# Patient Record
Sex: MALE | Race: WHITE | NOT HISPANIC OR LATINO | Employment: OTHER | URBAN - METROPOLITAN AREA
[De-identification: names, ages, dates, MRNs, and addresses within clinical notes are randomized per-mention and may not be internally consistent; named-entity substitution may affect disease eponyms.]

---

## 2019-06-25 ENCOUNTER — TELEPHONE (OUTPATIENT)
Dept: NEUROLOGY | Facility: CLINIC | Age: 59
End: 2019-06-25

## 2019-07-02 ENCOUNTER — OFFICE VISIT (OUTPATIENT)
Dept: UROLOGY | Facility: CLINIC | Age: 59
End: 2019-07-02
Payer: COMMERCIAL

## 2019-07-02 VITALS
DIASTOLIC BLOOD PRESSURE: 82 MMHG | WEIGHT: 240 LBS | HEIGHT: 71 IN | BODY MASS INDEX: 33.6 KG/M2 | HEART RATE: 105 BPM | SYSTOLIC BLOOD PRESSURE: 138 MMHG

## 2019-07-02 DIAGNOSIS — R35.1 BENIGN PROSTATIC HYPERPLASIA WITH NOCTURIA: Primary | ICD-10-CM

## 2019-07-02 DIAGNOSIS — N40.1 BENIGN PROSTATIC HYPERPLASIA WITH NOCTURIA: Primary | ICD-10-CM

## 2019-07-02 LAB — POST-VOID RESIDUAL VOLUME, ML POC: 41 ML

## 2019-07-02 PROCEDURE — 51798 US URINE CAPACITY MEASURE: CPT | Performed by: UROLOGY

## 2019-07-02 PROCEDURE — 99204 OFFICE O/P NEW MOD 45 MIN: CPT | Performed by: UROLOGY

## 2019-07-02 RX ORDER — OMEPRAZOLE 20 MG/1
20 CAPSULE, DELAYED RELEASE ORAL 2 TIMES DAILY
COMMUNITY
Start: 2019-06-25 | End: 2020-08-07 | Stop reason: HOSPADM

## 2019-07-02 RX ORDER — VENLAFAXINE HYDROCHLORIDE 150 MG/1
150 CAPSULE, EXTENDED RELEASE ORAL DAILY
COMMUNITY
Start: 2019-02-18

## 2019-07-02 RX ORDER — LEVOTHYROXINE SODIUM 75 UG/1
75 CAPSULE ORAL DAILY
COMMUNITY
Start: 2019-02-18

## 2019-07-02 RX ORDER — ALBUTEROL SULFATE 90 UG/1
2 AEROSOL, METERED RESPIRATORY (INHALATION) EVERY 6 HOURS PRN
COMMUNITY
Start: 2019-04-08 | End: 2020-04-07

## 2019-07-02 RX ORDER — OMEPRAZOLE 20 MG/1
20 CAPSULE, DELAYED RELEASE ORAL 2 TIMES DAILY
COMMUNITY
Start: 2019-02-18 | End: 2019-09-25

## 2019-07-02 RX ORDER — BUSPIRONE HYDROCHLORIDE 10 MG/1
TABLET ORAL 3 TIMES DAILY
COMMUNITY

## 2019-07-02 RX ORDER — CYCLOBENZAPRINE HCL 10 MG
10 TABLET ORAL
COMMUNITY
Start: 2019-06-24

## 2019-07-02 NOTE — PROGRESS NOTES
Referring Physician: Tara Nguyen MD  A copy of this note was sent to the referring physician  Diagnoses and all orders for this visit:    Benign prostatic hyperplasia with nocturia  -     POCT Measure PVR    Other orders  -     omeprazole (PriLOSEC) 20 mg delayed release capsule; Take 20 mg by mouth 2 (two) times a day  -     albuterol (PROVENTIL HFA) 90 mcg/act inhaler; Inhale 2 puffs every 6 (six) hours as needed  -     VENTOLIN  (90 Base) MCG/ACT inhaler  -     busPIRone (BUSPAR) 10 mg tablet; Take by mouth  -     cyclobenzaprine (FLEXERIL) 10 mg tablet  -     venlafaxine (EFFEXOR-XR) 150 mg 24 hr capsule; Take 150 mg by mouth daily  -     omeprazole (PriLOSEC) 20 mg delayed release capsule  -     Levothyroxine Sodium (TIROSINT) 75 MCG CAPS; Take 75 mcg by mouth daily            Assessment and plan:       1  Medically refractory lower urinary tract symptoms  - failed prior alpha blockade and anticholinergics      Today, we discussed a stepwise approach in treating lower urinary tract symptoms associated with BPH  Lower urinary tract symptoms (LUTS) can be sub-divided into those that result from failure of the bladder to store urine normally ("storage symptoms"), those that result from difficulties in emptying ("voiding symptoms"), or those that follow micturition ("post-micturition symptoms")  Obstructive symptoms such as hesitancy, weak stream, and pushing to urinate are classified as voiding symptoms  OAB is due to the inability of the bladder to store urine normally  The symptoms of frequency, urgency, nocturia, and urge incontinence are classified as storage symptoms  I discussed the mainstays of therapy for voiding symptoms including alpha blockers, 5-alpha reductase inhibitors, and surgical management including TURP (laser, monopolar, bipolar, button electrode), TUIP, and prostatectomy   I had a candid discussion about the risks of each of these medications and the mechanism of action  I also discussed the use of PD5 inhibitors for LUTS  Patient has failed medical management for BPH as well as overactive bladder  I have recommended further evaluation with cystoscopy and transrectal ultrasound  I recommended flexible cystoscopy for further evaluation  Discussed the risks benefits and alternatives to this diagnostic procedure  Risks include but are not limited to hematuria, pain, urinary tract infection, stricture formation, possible need for hospitalization  Patient verbalized understanding and has elected to proceed  Cystoscopy and transrectal ultrasound will be scheduled in the near future  Geoff Veliz MD      Chief Complaint     Second opinion lower urinary tract symptoms      History of Present Illness     Janet Soto is a 60 y o  male presents seeking an additional opinion for lower urinary tract symptoms  Trials describes a longstanding history of urinary frequency and urgency  He states this dates back to childhood  His symptoms have worsened over the past few years  He has been managed by an additional urologist   He has previously trialed and failed medical management for both for BPH as well as overactive bladder  He states that he was offered an InterStim  He states he has not undergone a diagnostic cystoscopy  Primary source of bother is frequency urgency  He has had occasional episodes of urge urinary incontinence  He also reports a number of systemic complaints including back pain, weakness, exercise intolerance  Does have a family history of prostate cancer in his father    Detailed Urologic History     - please refer to HPI    Review of Systems     Review of Systems   Constitutional: Positive for activity change  Negative for fatigue  HENT: Negative for congestion  Eyes: Negative for visual disturbance  Respiratory: Negative for shortness of breath and wheezing  Cardiovascular: Negative for chest pain and leg swelling  Gastrointestinal: Negative for abdominal pain  Endocrine: Positive for polyuria  Genitourinary: Positive for dysuria, frequency and urgency  Negative for flank pain and hematuria  Musculoskeletal: Negative for back pain  Allergic/Immunologic: Negative for immunocompromised state  Neurological: Negative for dizziness and numbness  Psychiatric/Behavioral: Negative for dysphoric mood  All other systems reviewed and are negative  AUA SYMPTOM SCORE      Most Recent Value   AUA SYMPTOM SCORE   How often have you had a sensation of not emptying your bladder completely after you finished urinating? 5   How often have you had to urinate again less than two hours after you finished urinating? 4   How often have you found you stopped and started again several times when you urinate? 5   How often have you found it difficult to postpone urination? 4   How often have you had a weak urinary stream?  5   How often have you had to push or strain to begin urination? 3   How many times did you most typically get up to urinate from the time you went to bed at night until the time you got up in the morning? 4   Quality of Life: If you were to spend the rest of your life with your urinary condition just the way it is now, how would you feel about that?  5   AUA SYMPTOM SCORE  30            Allergies     Allergies   Allergen Reactions    Aspirin     Halobetasol     Olanzapine        Physical Exam     Physical Exam   Constitutional: He is oriented to person, place, and time  He appears well-developed and well-nourished  No distress  Anxious   HENT:   Head: Normocephalic and atraumatic  Eyes: EOM are normal    Neck: Normal range of motion  Cardiovascular:   Overweight   Pulmonary/Chest: Effort normal and breath sounds normal    Abdominal: Soft  Genitourinary:   Genitourinary Comments: Negative CVA tenderness, negative suprapubic tenderness   Musculoskeletal: Normal range of motion     Neurological: He is alert and oriented to person, place, and time  Skin: Skin is warm  Psychiatric: He has a normal mood and affect  His behavior is normal            Vital Signs  There were no vitals filed for this visit  Current Medications     No current outpatient medications on file  Active Problems     There is no problem list on file for this patient  Past Medical History     No past medical history on file  Surgical History     No past surgical history on file  Family History     No family history on file  Social History     Social History     Social History     Tobacco Use   Smoking Status Not on file         Pertinent Lab Values     No results found for: CREATININE    No results found for: PSA    @RESULTRCNT(1H])@      Pertinent Imaging      - n/a    Portions of the record may have been created with voice recognition software   Occasional wrong word or "sound a like" substitutions may have occurred due to the inherent limitations of voice recognition software   Read the chart carefully and recognize, using context, where substitutions have occurred

## 2019-09-25 RX ORDER — IBUPROFEN 600 MG/1
TABLET ORAL
COMMUNITY
End: 2020-08-07 | Stop reason: HOSPADM

## 2019-09-26 ENCOUNTER — PROCEDURE VISIT (OUTPATIENT)
Dept: UROLOGY | Facility: CLINIC | Age: 59
End: 2019-09-26
Payer: COMMERCIAL

## 2019-09-26 ENCOUNTER — TELEPHONE (OUTPATIENT)
Dept: UROLOGY | Facility: AMBULATORY SURGERY CENTER | Age: 59
End: 2019-09-26

## 2019-09-26 VITALS
HEART RATE: 84 BPM | WEIGHT: 241.4 LBS | HEIGHT: 71 IN | BODY MASS INDEX: 33.8 KG/M2 | SYSTOLIC BLOOD PRESSURE: 132 MMHG | DIASTOLIC BLOOD PRESSURE: 80 MMHG

## 2019-09-26 DIAGNOSIS — N40.1 BENIGN PROSTATIC HYPERPLASIA WITH NOCTURIA: Primary | ICD-10-CM

## 2019-09-26 DIAGNOSIS — R35.1 BENIGN PROSTATIC HYPERPLASIA WITH NOCTURIA: Primary | ICD-10-CM

## 2019-09-26 DIAGNOSIS — R35.1 NOCTURIA: ICD-10-CM

## 2019-09-26 LAB — POST-VOID RESIDUAL VOLUME, ML POC: 12 ML

## 2019-09-26 PROCEDURE — 76872 US TRANSRECTAL: CPT | Performed by: UROLOGY

## 2019-09-26 PROCEDURE — 52000 CYSTOURETHROSCOPY: CPT | Performed by: UROLOGY

## 2019-09-26 PROCEDURE — 99214 OFFICE O/P EST MOD 30 MIN: CPT | Performed by: UROLOGY

## 2019-09-26 PROCEDURE — 51798 US URINE CAPACITY MEASURE: CPT | Performed by: UROLOGY

## 2019-09-26 PROCEDURE — 51741 ELECTRO-UROFLOWMETRY FIRST: CPT | Performed by: UROLOGY

## 2019-09-26 NOTE — TELEPHONE ENCOUNTER
I left a message for Melvin Parra to call me to confirm/discuss his Urolift on 11/7/19  Melvin Parra returned my call and confirmed Urolift date of 11/7/19

## 2019-09-26 NOTE — PROGRESS NOTES
Referring Physician: Barry Muhammad MD  A copy of this note was sent to the referring physician  Diagnoses and all orders for this visit:    Benign prostatic hyperplasia with nocturia  -     POCT Measure PVR  -     Case request operating room: [address] [place] [address]; Standing  -     Case request operating room: CYSTOSCOPY WITH INSERTION UROLIFT    Other orders  -     ibuprofen (MOTRIN) 600 mg tablet  -     Cancel: POCT urine dip  -     Diet NPO; Sips with meds; Standing  -     Place sequential compression device; Standing  -     ceFAZolin (ANCEF) 2,000 mg in dextrose 5 % 100 mL IVPB            Assessment and plan:       1  BPH w Medically refractory lower urinary tract symptoms  - failed prior alpha blockade and anticholinergics    We reviewed the options for treating BPH/LUTS which include but are not limited to expectant management, medical therapy, transurethral resection of prostate (TURP)  We also discussed minimally invasive options  At this point, the patient wishes to proceed with Urolift  This is a great option for the patient based on the following criteria:    - cystoscopy revealed bilobar hyperplasia  - estimated gland volume 45  - uroflow with   Maximum flow 3 mL/sec  - PVR with  10  - AUA SS 30  - Bother index: 5  - normal renal function  - no active urinary tract infection  - PSA: 2 7  - no documented allergy to nickel    - He has failed the following treatment options: tamsulosin, anticholergics   The additional morbidity of standard surgical options (ie, TURP) is not necessary given the above criteria  The risks of Urolift include but are not limited to bleeding, infection, reaction to anesthesia such as heart attack, stroke, DVT/PE, hyponatremia, bladder neck contracture, urethral stricture, injury to surrounding structures (ureters, rectum, etc)    We discussed that additional risks of trans urethral resection procedures such as incontinence, retrograde ejaculation, and erectile dysfunction have been only rarely reported with the Uro lift procedure  We did discuss that this is a new were procedure and a permanent implant  We discussed that there may be some long-term implications that her on for seen with this newer technology, such as perhaps complicating treatment for prostate cancer if indicated down the line  Finally, I told him that he may require additional procedures secondary to some of these complications  we also discussed the patient's extreme nocturia which is up to 10 times per night  Discussed the BPH is likely contributing but not necessarily the only mechanism for this  Realistic expectations were set regarding the anticipated results with this minimally invasive surgical procedure  Informed consent was obtained for the Uro lift procedure  This will be scheduled in the near future to be performed under IV sedation  He does have some transportation limitations  Will be scheduled at the [facility] per his request         Kristine Russell MD      Chief Complaint     Second opinion lower urinary tract symptoms      History of Present Illness     Linnea Crowell is a 60 y o  male presents  seeking an additional opinion for lower urinary tract symptoms  In brief, Medina Her describes a longstanding history of urinary frequency and urgency  He states this dates back to childhood  His symptoms have worsened over the past few years  He has been managed by an additional urologist   He has previously trialed and failed medical management for both for BPH as well as overactive bladder  He states that he was offered an InterStim  He states he has not undergone a diagnostic cystoscopy  Does have a family history of prostate cancer in his father    He presents   Today for cystoscopy and transrectal ultrasound for further evaluation  His lower urinary tract symptoms are unchanged    He continues to have rather extreme nocturia up to 10 times per night on occasion  Detailed Urologic History     - please refer to HPI    Review of Systems     Review of Systems   Constitutional: Positive for activity change  Negative for fatigue  HENT: Negative for congestion  Eyes: Negative for visual disturbance  Respiratory: Negative for shortness of breath and wheezing  Cardiovascular: Negative for chest pain and leg swelling  Gastrointestinal: Negative for abdominal pain  Endocrine: Positive for polyuria  Genitourinary: Positive for dysuria, frequency and urgency  Negative for flank pain and hematuria  Musculoskeletal: Negative for back pain  Allergic/Immunologic: Negative for immunocompromised state  Neurological: Negative for dizziness and numbness  Psychiatric/Behavioral: Negative for dysphoric mood  All other systems reviewed and are negative  AUA SYMPTOM SCORE      Most Recent Value   AUA SYMPTOM SCORE   How often have you had a sensation of not emptying your bladder completely after you finished urinating? 5   How often have you had to urinate again less than two hours after you finished urinating? 4   How often have you found you stopped and started again several times when you urinate? 5   How often have you found it difficult to postpone urination? 4   How often have you had a weak urinary stream?  5   How often have you had to push or strain to begin urination? 3   How many times did you most typically get up to urinate from the time you went to bed at night until the time you got up in the morning? 4   Quality of Life: If you were to spend the rest of your life with your urinary condition just the way it is now, how would you feel about that?  5   AUA SYMPTOM SCORE  30            Allergies     Allergies   Allergen Reactions    Aspirin     Halobetasol     Olanzapine        Physical Exam     Physical Exam   Constitutional: He is oriented to person, place, and time   He appears well-developed and well-nourished  No distress  Anxious   HENT:   Head: Normocephalic and atraumatic  Eyes: EOM are normal    Neck: Normal range of motion  Cardiovascular:   Overweight   Pulmonary/Chest: Effort normal and breath sounds normal    Abdominal: Soft  Genitourinary: Penis normal    Genitourinary Comments: Negative CVA tenderness, negative suprapubic tenderness   Musculoskeletal: Normal range of motion  Neurological: He is alert and oriented to person, place, and time  Skin: Skin is warm  Psychiatric: He has a normal mood and affect  His behavior is normal            Vital Signs  Vitals:    19 0839   BP: 132/80   Pulse: 84   Weight: 109 kg (241 lb 6 4 oz)   Height: 5' 11" (1 803 m)         Current Medications       Current Outpatient Medications:     albuterol (PROVENTIL HFA) 90 mcg/act inhaler, Inhale 2 puffs every 6 (six) hours as needed, Disp: , Rfl:     busPIRone (BUSPAR) 10 mg tablet, Take by mouth, Disp: , Rfl:     cyclobenzaprine (FLEXERIL) 10 mg tablet, , Disp: , Rfl:     ibuprofen (MOTRIN) 600 mg tablet, , Disp: , Rfl:     Levothyroxine Sodium (TIROSINT) 75 MCG CAPS, Take 75 mcg by mouth daily, Disp: , Rfl:     omeprazole (PriLOSEC) 20 mg delayed release capsule, , Disp: , Rfl:     venlafaxine (EFFEXOR-XR) 150 mg 24 hr capsule, Take 150 mg by mouth daily, Disp: , Rfl:       Active Problems     Patient Active Problem List   Diagnosis    Benign prostatic hyperplasia with nocturia         Past Medical History     History reviewed  No pertinent past medical history  Surgical History     Past Surgical History:   Procedure Laterality Date    HERNIA REPAIR           Family History     History reviewed  No pertinent family history        Social History     Social History     Social History     Tobacco Use   Smoking Status Former Smoker    Last attempt to quit:     Years since quittin 7   Smokeless Tobacco Never Used         Pertinent Lab Values     No results found for: CREATININE    2 7    @RESULTRCNT(1H])@      Pertinent Imaging      - n/a    Portions of the record may have been created with voice recognition software   Occasional wrong word or "sound a like" substitutions may have occurred due to the inherent limitations of voice recognition software   Read the chart carefully and recognize, using context, where substitutions have occurred

## 2019-09-26 NOTE — PROGRESS NOTES
Uroflow Result    Indication:     medically refractory lower urinary tract symptoms       Procedure  The patient was brought ambulatory to the [facility] and instructions provided  There asked to void volitionally into the uroflow apparatus  The following findings were noted:    Findings:  Voided Volume:    16  Maximum flow (Qmax):   3 ml/s  Description of emptying curve:   diminutive and flattened due to low emptied volume       Post Void Residual Measurement:  After voiding to completion the patient was brought to the exam room and positioned supine    Residual urine volume was measured with an ultrasound at:    10 ml

## 2019-09-26 NOTE — PROGRESS NOTES
Office Cystoscopy Procedure Note    Indication:     medically refractory lower urinary tract symptoms     Informed consent   The risks, benefits, complications, treatment options, and expected outcomes were discussed with the patient  The patient concurred with the proposed plan and provided informed consent  Anesthesia  Lidocaine jelly 2%    Antibiotic prophylaxis   None    Procedure  The patient was placed in the supineposition, was prepped and draped in the usual manner using sterile technique, and 2% lidocaine jelly instilled into the urethra  A 17 F flexible cystoscope was then inserted into the urethra and the urethra and bladder carefully examined  The following findings were noted:    Findings:  Urethra:  Mild stricturing of urethra  Prostate:  Bilobar hyperplasia, 100% kissing of the lateral lobes, no median lobe, no intravesical prostatic protrusion  Bladder:  Normal  Ureteral orifices:  Normal  Other findings:  None     Specimens: None                 Complications:    None; patient tolerated the procedure well           Disposition: To home after 30 minute observation             Condition: Stable

## 2019-09-26 NOTE — PROGRESS NOTES
Office TRUS    Indication    Prostate volumetrics for surgical planning    Transrectal ultrasonography  The patient was placed in the left lateral decubitus position  After an attentive digital rectal examination, a 7 5 mHz sidefire ultrasound probe was gently inserted into the rectum and biplanar imaging of the prostate was done with the findings noted below  Images were taken of any abnormal findings and also to document prostate size      Bladder  The bladder base appeared normal     Prostate      Ultrasound size measurements:  -Volume:  45 cm3  - Width 5 2 cm  - Height: 3 2 cm  - Length: 5 1 cm    Ultrasound findings:  -Cysts: None  -Masses: None  -Median lobe: absent

## 2019-09-30 ENCOUNTER — OFFICE VISIT (OUTPATIENT)
Dept: LAB | Facility: HOSPITAL | Age: 59
End: 2019-09-30
Attending: UROLOGY
Payer: COMMERCIAL

## 2019-09-30 DIAGNOSIS — R35.1 BENIGN PROSTATIC HYPERPLASIA WITH NOCTURIA: ICD-10-CM

## 2019-09-30 DIAGNOSIS — N40.1 BENIGN PROSTATIC HYPERPLASIA WITH NOCTURIA: ICD-10-CM

## 2019-09-30 LAB
ATRIAL RATE: 86 BPM
P AXIS: 56 DEGREES
PR INTERVAL: 146 MS
QRS AXIS: -25 DEGREES
QRSD INTERVAL: 74 MS
QT INTERVAL: 364 MS
QTC INTERVAL: 435 MS
T WAVE AXIS: -9 DEGREES
VENTRICULAR RATE: 86 BPM

## 2019-09-30 PROCEDURE — 93005 ELECTROCARDIOGRAM TRACING: CPT

## 2019-09-30 PROCEDURE — 93010 ELECTROCARDIOGRAM REPORT: CPT | Performed by: INTERNAL MEDICINE

## 2019-10-28 NOTE — PRE-PROCEDURE INSTRUCTIONS
Pre-Surgery Instructions:   Medication Instructions    albuterol (PROVENTIL HFA) 90 mcg/act inhaler Instructed patient per Anesthesia Guidelines   busPIRone (BUSPAR) 10 mg tablet Instructed patient per Anesthesia Guidelines   cyclobenzaprine (FLEXERIL) 10 mg tablet Instructed patient per Anesthesia Guidelines   ibuprofen (MOTRIN) 600 mg tablet Instructed patient per Anesthesia Guidelines   Levothyroxine Sodium (TIROSINT) 75 MCG CAPS Instructed patient per Anesthesia Guidelines   omeprazole (PriLOSEC) 20 mg delayed release capsule Instructed patient per Anesthesia Guidelines   venlafaxine (EFFEXOR-XR) 150 mg 24 hr capsule Instructed patient per Anesthesia Guidelines

## 2019-10-29 ENCOUNTER — ANESTHESIA EVENT (OUTPATIENT)
Dept: PERIOP | Facility: HOSPITAL | Age: 59
End: 2019-10-29
Payer: COMMERCIAL

## 2019-10-29 DIAGNOSIS — Z91.89 RISK FACTORS FOR OBSTRUCTIVE SLEEP APNEA: Primary | ICD-10-CM

## 2019-11-07 ENCOUNTER — HOSPITAL ENCOUNTER (OUTPATIENT)
Facility: HOSPITAL | Age: 59
Setting detail: OUTPATIENT SURGERY
Discharge: HOME | End: 2019-11-07
Attending: UROLOGY | Admitting: UROLOGY
Payer: COMMERCIAL

## 2019-11-07 ENCOUNTER — ANESTHESIA (OUTPATIENT)
Dept: PERIOP | Facility: HOSPITAL | Age: 59
End: 2019-11-07
Payer: COMMERCIAL

## 2019-11-07 VITALS
HEART RATE: 84 BPM | OXYGEN SATURATION: 97 % | RESPIRATION RATE: 20 BRPM | BODY MASS INDEX: 34.72 KG/M2 | TEMPERATURE: 97.6 F | SYSTOLIC BLOOD PRESSURE: 148 MMHG | WEIGHT: 242.51 LBS | DIASTOLIC BLOOD PRESSURE: 96 MMHG | HEIGHT: 70 IN

## 2019-11-07 DIAGNOSIS — R35.1 BENIGN PROSTATIC HYPERPLASIA WITH NOCTURIA: Primary | ICD-10-CM

## 2019-11-07 DIAGNOSIS — N40.1 BENIGN PROSTATIC HYPERPLASIA WITH NOCTURIA: Primary | ICD-10-CM

## 2019-11-07 PROCEDURE — 52441 CYSTO INSJ TRNSPRSTC 1 IMPLT: CPT | Performed by: UROLOGY

## 2019-11-07 PROCEDURE — NC001 PR NO CHARGE: Performed by: UROLOGY

## 2019-11-07 PROCEDURE — L8699 PROSTHETIC IMPLANT NOS: HCPCS | Performed by: UROLOGY

## 2019-11-07 PROCEDURE — 52442 CYSTO INS TRNSPRSTC IMPLT EA: CPT | Performed by: UROLOGY

## 2019-11-07 DEVICE — IMPLANT URO PROSTATE UROLIFT: Type: IMPLANTABLE DEVICE | Site: BLADDER | Status: FUNCTIONAL

## 2019-11-07 RX ORDER — DIPHENHYDRAMINE HYDROCHLORIDE 50 MG/ML
12.5 INJECTION INTRAMUSCULAR; INTRAVENOUS ONCE AS NEEDED
Status: DISCONTINUED | OUTPATIENT
Start: 2019-11-07 | End: 2019-11-07 | Stop reason: HOSPADM

## 2019-11-07 RX ORDER — FENTANYL CITRATE 50 UG/ML
INJECTION, SOLUTION INTRAMUSCULAR; INTRAVENOUS AS NEEDED
Status: DISCONTINUED | OUTPATIENT
Start: 2019-11-07 | End: 2019-11-07 | Stop reason: SURG

## 2019-11-07 RX ORDER — HYDROCODONE BITARTRATE AND ACETAMINOPHEN 5; 325 MG/1; MG/1
1 TABLET ORAL EVERY 6 HOURS PRN
Qty: 5 TABLET | Refills: 0 | Status: SHIPPED | OUTPATIENT
Start: 2019-11-07 | End: 2019-11-17

## 2019-11-07 RX ORDER — DOCUSATE SODIUM 100 MG/1
100 CAPSULE, LIQUID FILLED ORAL 2 TIMES DAILY
Qty: 30 CAPSULE | Refills: 0 | Status: SHIPPED | OUTPATIENT
Start: 2019-11-07 | End: 2019-11-22

## 2019-11-07 RX ORDER — OXYCODONE HYDROCHLORIDE 5 MG/1
5 TABLET ORAL EVERY 4 HOURS PRN
Status: DISCONTINUED | OUTPATIENT
Start: 2019-11-07 | End: 2019-11-07 | Stop reason: HOSPADM

## 2019-11-07 RX ORDER — MEPERIDINE HYDROCHLORIDE 50 MG/ML
12.5 INJECTION INTRAMUSCULAR; INTRAVENOUS; SUBCUTANEOUS ONCE AS NEEDED
Status: DISCONTINUED | OUTPATIENT
Start: 2019-11-07 | End: 2019-11-07 | Stop reason: HOSPADM

## 2019-11-07 RX ORDER — CEFAZOLIN SODIUM 2 G/50ML
2000 SOLUTION INTRAVENOUS ONCE
Status: COMPLETED | OUTPATIENT
Start: 2019-11-07 | End: 2019-11-07

## 2019-11-07 RX ORDER — FENTANYL CITRATE/PF 50 MCG/ML
50 SYRINGE (ML) INJECTION
Status: DISCONTINUED | OUTPATIENT
Start: 2019-11-07 | End: 2019-11-07 | Stop reason: HOSPADM

## 2019-11-07 RX ORDER — ONDANSETRON 2 MG/ML
4 INJECTION INTRAMUSCULAR; INTRAVENOUS ONCE AS NEEDED
Status: DISCONTINUED | OUTPATIENT
Start: 2019-11-07 | End: 2019-11-07 | Stop reason: HOSPADM

## 2019-11-07 RX ORDER — CEPHALEXIN 500 MG/1
500 CAPSULE ORAL EVERY 6 HOURS SCHEDULED
Qty: 3 CAPSULE | Refills: 0 | Status: SHIPPED | OUTPATIENT
Start: 2019-11-07 | End: 2019-11-08

## 2019-11-07 RX ORDER — PROPOFOL 10 MG/ML
INJECTION, EMULSION INTRAVENOUS AS NEEDED
Status: DISCONTINUED | OUTPATIENT
Start: 2019-11-07 | End: 2019-11-07 | Stop reason: SURG

## 2019-11-07 RX ORDER — SODIUM CHLORIDE, SODIUM LACTATE, POTASSIUM CHLORIDE, CALCIUM CHLORIDE 600; 310; 30; 20 MG/100ML; MG/100ML; MG/100ML; MG/100ML
125 INJECTION, SOLUTION INTRAVENOUS CONTINUOUS
Status: DISCONTINUED | OUTPATIENT
Start: 2019-11-07 | End: 2019-11-07 | Stop reason: HOSPADM

## 2019-11-07 RX ORDER — METOCLOPRAMIDE HYDROCHLORIDE 5 MG/ML
10 INJECTION INTRAMUSCULAR; INTRAVENOUS ONCE AS NEEDED
Status: DISCONTINUED | OUTPATIENT
Start: 2019-11-07 | End: 2019-11-07 | Stop reason: HOSPADM

## 2019-11-07 RX ORDER — MAGNESIUM HYDROXIDE 1200 MG/15ML
LIQUID ORAL AS NEEDED
Status: DISCONTINUED | OUTPATIENT
Start: 2019-11-07 | End: 2019-11-07 | Stop reason: HOSPADM

## 2019-11-07 RX ORDER — PHENAZOPYRIDINE HYDROCHLORIDE 200 MG/1
200 TABLET, FILM COATED ORAL 3 TIMES DAILY PRN
Qty: 10 TABLET | Refills: 0 | Status: SHIPPED | OUTPATIENT
Start: 2019-11-07 | End: 2019-11-10

## 2019-11-07 RX ORDER — PROPOFOL 10 MG/ML
INJECTION, EMULSION INTRAVENOUS CONTINUOUS PRN
Status: DISCONTINUED | OUTPATIENT
Start: 2019-11-07 | End: 2019-11-07 | Stop reason: SURG

## 2019-11-07 RX ORDER — NAPROXEN 500 MG/1
500 TABLET ORAL 2 TIMES DAILY WITH MEALS
Qty: 10 TABLET | Refills: 0 | Status: SHIPPED | OUTPATIENT
Start: 2019-11-07 | End: 2020-08-07 | Stop reason: HOSPADM

## 2019-11-07 RX ORDER — LIDOCAINE HYDROCHLORIDE 10 MG/ML
INJECTION, SOLUTION INFILTRATION; PERINEURAL AS NEEDED
Status: DISCONTINUED | OUTPATIENT
Start: 2019-11-07 | End: 2019-11-07 | Stop reason: SURG

## 2019-11-07 RX ORDER — PROMETHAZINE HYDROCHLORIDE 25 MG/ML
25 INJECTION, SOLUTION INTRAMUSCULAR; INTRAVENOUS ONCE AS NEEDED
Status: DISCONTINUED | OUTPATIENT
Start: 2019-11-07 | End: 2019-11-07 | Stop reason: HOSPADM

## 2019-11-07 RX ADMIN — PROPOFOL 100 MCG/KG/MIN: 10 INJECTION, EMULSION INTRAVENOUS at 09:58

## 2019-11-07 RX ADMIN — SODIUM CHLORIDE, SODIUM LACTATE, POTASSIUM CHLORIDE, AND CALCIUM CHLORIDE: .6; .31; .03; .02 INJECTION, SOLUTION INTRAVENOUS at 09:18

## 2019-11-07 RX ADMIN — CEFAZOLIN SODIUM 2000 MG: 2 SOLUTION INTRAVENOUS at 09:50

## 2019-11-07 RX ADMIN — FENTANYL CITRATE 50 MCG: 50 INJECTION, SOLUTION INTRAMUSCULAR; INTRAVENOUS at 10:11

## 2019-11-07 RX ADMIN — LIDOCAINE HYDROCHLORIDE 50 MG: 10 INJECTION, SOLUTION INFILTRATION; PERINEURAL at 09:58

## 2019-11-07 RX ADMIN — FENTANYL CITRATE 50 MCG: 50 INJECTION, SOLUTION INTRAMUSCULAR; INTRAVENOUS at 10:02

## 2019-11-07 RX ADMIN — PROPOFOL 100 MG: 10 INJECTION, EMULSION INTRAVENOUS at 09:58

## 2019-11-07 NOTE — DISCHARGE INSTRUCTIONS
UROLIFT    Mr Raúl Ireland:    Your surgery went incredibly well today  I was able to create a significant and more natural opening with annual prostate using 6 Uro lift implants  I expect you will have a great result though it will take some time for things to improve  Please remove your Tesfaye catheter tomorrow at home as instructed by the nurses  We will schedule an office visit in a few weeks to assess your recovery      WHAT YOU NEED TO KNOW:   A UroLift is procedure that is done to open the natural channel within your prostate gland  DISCHARGE INSTRUCTIONS:   Medicines:   · Pain medicine: You may be given a prescription medicine to decrease pain  Do not wait until the pain is severe before you take these medicines:  · Naproxen (prescription-strength NSAID/Ibuprofen type medicine) - for moderate pain  This can be substituted with over the counter Ibuprofen if more convenient, or it this is less expensive  · Pyridium - to minimize pain and discomfort when passing your urine  Will turn your urine orange  This can be substituted with over the counter "AZO" if more convenient, or it this is less expensive  · Norco/Percocet - for severe pain  Can be sedating and constipating  · Colace - to prevent constipation  This is also an over the counter medicine - you can purchase it without a prescription, if more convenient or less expensive    · Antibiotics:  You may be provided with antibiotics at discharge, particularly if you are being sent home with a tesfaye catheter     This medicine is given to fight or prevent an infection caused by bacteria  Always take your antibiotics exactly as ordered by your healthcare provider  Do not stop taking your medicine unless directed by your healthcare provider  Never save antibiotics or take leftover antibiotics that were given to you for another illness  · Take your medicine as directed    Contact your healthcare provider if you think your medicine is not helping or if you have side effects  Tell him or her if you are allergic to any medicine  Keep a list of the medicines, vitamins, and herbs you take  Include the amounts, and when and why you take them  Bring the list or the pill bottles to follow-up visits  Carry your medicine list with you in case of an emergency  Follow up with your healthcare provider or urologist as directed: You may need to return to make sure you do not have an infection, or to have your Segundo catheter removed  Write down your questions so you remember to ask them during your visits  Segundo catheter care: You may be sent home with a Segundo catheter  If so you will be provided with instructions to remove it    Bladder control:  After surgery, you may leak urine and have trouble controlling when you urinate  Ask for more information about the following ways to help decrease urine leakage:  · Avoid caffeine:  Caffeine can cause problems with bladder control and increase your need to urinate  · Do pelvic floor muscle exercises:  Pelvic floor muscle exercises may help improve your bladder control, if you leak urine  These exercises are done by tightening and relaxing your pelvic muscles  Ask how to do pelvic floor muscle exercises, and how often to do them  · Limit your liquids:  Drink smaller amounts of liquid throughout the day  Do not drink before bedtime  Ask if you should decrease the amount of liquid you drink each day  This may help you control your bladder  · Wear a pad or adult diapers: These may help to absorb leaking urine and decrease the odor  Activity guidelines:  Ask when it is okay for you to return to work and activities, or to have sex  Contact your healthcare provider or urologist if:   · You have a fever  · You have new or more blood in your urine  · You have trouble starting to urinate, or have a weak stream of urine when you urinate  · You feel like you have a full bladder, even after you urinate   You may also leak urine  · You often wake up during the night to urinate  You may also feel the need to urinate right away  · You feel pain and burning when you urinate  · You feel pain or pressure in your lower abdomen  · Your urine looks cloudy, and smells bad  · You have trouble getting an erection or ejaculating  · You have questions or concerns about your condition or care  Seek care immediately or call 911 if:   · You urinate little or not at all  · You have severe abdominal or back pain  · You are dizzy or confused  · You have abdominal pain, nausea, and vomiting  · Your heartbeat is slower than usual   © 2017 [address] [employer] Information is for End User's use only and may not be sold, redistributed or otherwise used for commercial purposes  All illustrations and images included in CareNotes® are the copyrighted property of [employer]  or [employer]  The above information is an  only  It is not intended as medical advice for individual conditions or treatments  Talk to your doctor, nurse or pharmacist before following any medical regimen to see if it is safe and effective for you  Segundo Catheter Placement and Care   WHAT YOU NEED TO KNOW:   A Segundo catheter is a sterile tube that is inserted into your bladder to drain urine  It is also called an indwelling urinary catheter  The tip of the catheter has a small balloon filled with solution that holds the catheter inside your bladder  DISCHARGE INSTRUCTIONS:   Return to the emergency department if:   · Your catheter comes out  · You suddenly have material that looks like sand in the tubing or drainage bag  · No urine is draining into the bag and you have checked the system  · You have pain in your hip, back, pelvis, or lower abdomen  · You are confused or cannot think clearly  Contact your healthcare provider if:   · You have a fever      · You have bladder spasms for more than 1 day after the catheter is placed  · You see blood in the tubing or drainage bag  · You have a rash or itching where the catheter tube is secured to your skin  · Urine leaks from or around the catheter, tubing, or drainage bag  · The closed drainage system has accidently come open or apart  · You see a layer of crystals inside the tubing  · You have questions or concerns about your condition or care  Care for your Segundo catheter:   · Clean your genital area 2 times every day  Clean your catheter and the area around where it was inserted  Use soap and water  Clean your anal opening and catheter area after every bowel movement  · Secure the catheter tube  so you do not pull or move the catheter  This helps prevent pain and bladder spasms  Healthcare providers will show you how to use medical tape or a strap to secure the catheter tube to your body  · Keep a closed drainage system  Your Segundo catheter should always be attached to the drainage bag to form a closed system  Do not disconnect any part of the closed system unless you need to change the bag  Care for your drainage bag:   · Ask if a leg bag is right for you  A leg bag can be worn under your clothes  Ask your healthcare provider for more information about a leg bag  · Keep the drainage bag below the level of your waist   This helps stop urine from moving back up the tubing and into your bladder  Do not loop or kink the tubing  This can cause urine to back up and collect in your bladder  Do not let the drainage bag touch or lie on the floor  · Empty the drainage bag when needed  The weight of a full drainage bag can be painful  Empty the drainage bag every 3 to 6 hours or when it is ? full  · Clean and change the drainage bag as directed  Ask your healthcare provider how often you should change the drainage bag and what cleaning solution to use  Wear disposable gloves when you change the bag   Do not allow the end of the catheter or tubing to touch anything  Clean the ends with an alcohol pad before you reconnect them  What to do if problems develop:   · No urine is draining into the bag:      ¨ Check for kinks in the tubing and straighten them out  ¨ Check the tape or strap used to secure the catheter tube to your skin  Make sure it is not blocking the tube  ¨ Make sure you are not sitting or lying on the tubing  ¨ Make sure the urine bag is hanging below the level of your waist     · Urine leaks from or around the catheter, tubing, or drainage bag:  Check if the closed drainage system has accidently come open or apart  Clean the catheter and tubing ends with a new alcohol pad and reconnect them  Prevent an infection:   · Wash your hands often  Wash before and after you touch your catheter, tubing, or drainage bag  Use soap and water  Wear clean disposable gloves when you care for your catheter or disconnect the drainage bag  Wash your hands before you prepare or eat food  · Drink liquids as directed  Ask your healthcare provider how much liquid to drink each day and which liquids are best for you  Liquids will help flush your kidneys and bladder to help prevent infection  Follow up with your healthcare provider as directed:  Write down your questions so you remember to ask them during your visits  © 2017 [address] Information is for End User's use only and may not be sold, redistributed or otherwise used for commercial purposes  All illustrations and images included in CareNotes® are the copyrighted property of [employer]  or [employer]  The above information is an  only  It is not intended as medical advice for individual conditions or treatments  Talk to your doctor, nurse or pharmacist before following any medical regimen to see if it is safe and effective for you

## 2019-11-07 NOTE — ANESTHESIA PREPROCEDURE EVALUATION
Review of Systems/Medical History  Patient summary reviewed  Chart reviewed  No history of anesthetic complications     Cardiovascular  Negative cardio ROS    Pulmonary  Asthma ,        GI/Hepatic    GERD well controlled,        Prostatic disorder, benign prostatic hyperplasia       Endo/Other  History of thyroid disease , hypothyroidism,   Obesity    GYN       Hematology  Negative hematology ROS      Musculoskeletal    Arthritis     Neurology  Negative neurology ROS      Psychology   Anxiety, Depression ,              Physical Exam    Airway    Mallampati score: II  TM Distance: >3 FB  Neck ROM: full     Dental       Cardiovascular  Comment: Negative ROS,     Pulmonary      Other Findings        Anesthesia Plan  ASA Score- 2     Anesthesia Type- IV sedation with anesthesia with ASA Monitors  Additional Monitors:   Airway Plan:         Plan Factors-    Induction- intravenous  Postoperative Plan-     Informed Consent- Anesthetic plan and risks discussed with patient  I personally reviewed this patient with the CRNA  Discussed and agreed on the Anesthesia Plan with the CRNA  Darvin Olivares

## 2019-11-07 NOTE — ANESTHESIA POSTPROCEDURE EVALUATION
Post-Op Assessment Note    CV Status:  Stable  Pain Score: 0    Pain management: adequate     Mental Status:  Alert and awake   Hydration Status:  Euvolemic   PONV Controlled:  Controlled   Airway Patency:  Patent   Post Op Vitals Reviewed: Yes      Staff: CRNA           /87 (11/07/19 1024)    Temp 97 6 °F (36 4 °C) (11/07/19 1024)    Pulse 97 (11/07/19 1024)   Resp 14 (11/07/19 1024)    SpO2 97 % (11/07/19 1024)

## 2019-11-07 NOTE — OP NOTE
Operative Note     PATIENT:  Broderick George (MRN [id number])    DATE OF PROCEDURE:   11/7/2019    PRE-OP DIAGNOSES:   1) BPH with obstruction  2) urinary incontinence     POST-OP DIAGNOSES AND OPERATIVE FINDINGS:   1) BPH with obstruction  2) urinary incontinence    PROCEDURES:  1) UroLift implantation    SURGEON:   Ayana Blanco MD    No qualified teaching residents available to assist    ANESTHESIA TYPE:  General anesthesia    ESTIMATED BLOOD LOSS:   Minimal    COMPLICATIONS:   None    ANTIBIOTICS:  Cefazolin    INTRAOPERATIVE THROMBOEMBOLISM PROPHYLAXIS:  Pneumatic compression stockings      PROCEDURE SUMMARY:    The patient was identified, brought to the operating room, and placed on the table in supine position  After induction of general anesthesia, the patient was placed in dorsal lithotomy position and prepped and draped in the usual sterile fashion  A complete formal timeout was performed  A 20F cystoscope was inserted into the bladder  The cystoscopy bridge was replaced with a UroLift delivery device  The first treatment site was the patient's left side approximately 1 5 cm distal to the bladder neck  The distal tip of the delivery device was then angled laterally approximately 20 degrees at this position to compress the lateral lobe  The trigger was pulled, thereby deploying a needle containing the implant through the prostate  The needle was then retracted, allowing one end of the implant to be delivered to the capsular surface of the prostate  The implant was then tensioned to assure capsular seating and removal of slack monofilament  The device was then angled back toward midline and slowly advanced proximally until cystoscopic verification of the monofilament being centered in the delivery bay  The urethral end piece was then affixed to the monofilament thereby tailoring the size of the implant  Excess filament was then severed    The delivery device was then re-advanced into the bladder  The delivery device was then replaced with cystoscope and bridge and the implant location and opening effect was confirmed cystoscopically  The same procedure was then repeated on the right side, and two additional implants were delivered just proximal to the veru montanum, again one on left and one on right side of the prostate, following the same technique  A total of 6 implants were deployed to create a nice anterior channel  Implants were deployed in the following locations:    1,2  Right bladder neck x 2 (stacking technique)  3,4  Left bladder neck  x 2 (stacking technique)  5  Right apex/mid gland  6  Left apex/mid gland    A final cystoscopy was conducted first to inspect the location and state of each implant and second, to confirm the presence of a continuous anterior channel was present through the prostatic urethra with irrigation flow turned off  A tesfaye catheter was then placed  The patient tolerated the procedure well and was transferred to the recovery room awake alert and in stable condition  IMPLANTS:   Implant Name Type Inv  Item Serial No   Lot No  LRB No  Used   IMPLANT URO PROSTATE UROLIFT - [id number]  IMPLANT URO PROSTATE UROLIFT  [employer] [id number] N/A 4   IMPLANT URO PROSTATE UROLIFT - [id number]  IMPLANT URO PROSTATE UROLIFT  [employer] [id number] N/A 2      PLAN:  Patient will be discharged home with a Tesfaye catheter  He was instructed to remove it at home tomorrow morning    He will follow up in few weeks for symptom reassessment

## 2019-11-07 NOTE — H&P
UROLOGY HISTORY AND PHYSICAL     Patient Identifiers: Laura Colón (MRN [id number])      Date of Service: 2019        ASSESSMENT:     60 y o  old male with  medically refractory BPH presents for Uro lift  PLAN:   To OR for Uro lift        History of Present Illness:     Laura Colón is a 60 y o  old with a history of medically refractory BPH presents for Urolift    Past Medical, Past Surgical History:     Past Medical History:   Diagnosis Date    Anxiety     Arthritis     Colon polyp     Disease of thyroid gland     Muscle weakness     Shortness of breath    :    Past Surgical History:   Procedure Laterality Date    COLONOSCOPY      HERNIA REPAIR  2003   :    Medications, Allergies:     Current Facility-Administered Medications:     ceFAZolin (ANCEF) IVPB (premix) 2,000 mg, 2,000 mg, Intravenous, Once, Marlo Bailey MD    fentaNYL (SUBLIMAZE) injection 50 mcg, 50 mcg, Intravenous, Q3 min PRN, Errol Torres MD    metoclopramide (REGLAN) injection 10 mg, 10 mg, Intravenous, Once PRN, Errol Torres MD    ondansetron (ZOFRAN) injection 4 mg, 4 mg, Intravenous, Once PRN, Errol Torres MD    Allergies: Allergies   Allergen Reactions    Aspirin      Stomach pain      Halobetasol Hallucinations    Olanzapine Hallucinations   :    Social and Family History:   Social History:   Social History     Tobacco Use    Smoking status: Former Smoker     Last attempt to quit:      Years since quittin 8    Smokeless tobacco: Never Used   Substance Use Topics    Alcohol use: Never     Frequency: Never    Drug use: Never     Social History     Tobacco Use   Smoking Status Former Smoker    Last attempt to quit:     Years since quittin 8   Smokeless Tobacco Never Used       Family History:  History reviewed  No pertinent family history :     Review of Systems:     General: Fever, chills, or night sweats: negative  Cardiac: Negative for chest pain  Pulmonary: Negative for shortness of breath  Gastrointestinal: Abdominal pain negative  Nausea, vomiting, or diarrhea negative  Genitourinary: See HPI above  Patient does nothave hematuria  All other systems queried were negative  Physical Exam:   General: Patient is pleasant and in NAD  Awake and alert  /92   Pulse 98   Temp 97 5 °F (36 4 °C) (Temporal)   Resp 19   Ht 5' 10" (1 778 m)   Wt 110 kg (242 lb 8 1 oz)   SpO2 96%   BMI 34 80 kg/m²   Cardiac: Peripheral edema: negative  Pulmonary: Non-labored breathing  Abdomen: Soft, non-tender, non-distended  No surgical scars  No masses, tenderness, hernias noted  Genitourinary: negative CVA tenderness, negative suprapubic tenderness  Labs:   No results found for: HGB, HCT, WBC, PLT]    No results found for: NA, K, CL, CO2, BUN, CREATININE, CALCIUM, GLUCOSE]    Imaging:   I personally reviewed the images and report of the following studies, and reviewed them with the patient:        Thank you for allowing me to participate in this patients care  Please do not hesitate to call with any additional questions    Amalia Chairez MD

## 2019-11-13 ENCOUNTER — TELEPHONE (OUTPATIENT)
Dept: UROLOGY | Facility: CLINIC | Age: 59
End: 2019-11-13

## 2019-11-13 NOTE — TELEPHONE ENCOUNTER
Post Op Note    Josefina Kwan is a 60 y o  male s/p urolift performed 11/7/19  Josefina Kwan is a patient of Dr David Johansen and is seen at the [facility] office  How would you rate your pain on a scale from 1 to 10, 10 being the worst pain ever? 0  Have you had a fever? No  Have your bowel movements been regular? No, patient reports he has IBS, so he has been taking   Do you have any difficulty urinating? No  If the patient has an incision- do you have any redness around the incision or any drainage from the incision? NA  If the patient has a drain- are you having any issues with the drain? NA  If the patient has a tesfaye- are you comfortable caring for your tesfaye? NA  Do you have any other questions or concerns that I can address at this time? No other questions or concerns at this time, confirmed follow up appointment for 12/5/19

## 2019-12-05 ENCOUNTER — OFFICE VISIT (OUTPATIENT)
Dept: UROLOGY | Facility: CLINIC | Age: 59
End: 2019-12-05
Payer: COMMERCIAL

## 2019-12-05 VITALS
WEIGHT: 237 LBS | SYSTOLIC BLOOD PRESSURE: 140 MMHG | DIASTOLIC BLOOD PRESSURE: 80 MMHG | HEIGHT: 70 IN | HEART RATE: 103 BPM | BODY MASS INDEX: 33.93 KG/M2

## 2019-12-05 DIAGNOSIS — R35.1 BENIGN PROSTATIC HYPERPLASIA WITH NOCTURIA: Primary | ICD-10-CM

## 2019-12-05 DIAGNOSIS — N40.1 BENIGN PROSTATIC HYPERPLASIA WITH NOCTURIA: Primary | ICD-10-CM

## 2019-12-05 LAB — POST-VOID RESIDUAL VOLUME, ML POC: 21 ML

## 2019-12-05 PROCEDURE — 99213 OFFICE O/P EST LOW 20 MIN: CPT | Performed by: PHYSICIAN ASSISTANT

## 2019-12-05 PROCEDURE — 51798 US URINE CAPACITY MEASURE: CPT | Performed by: PHYSICIAN ASSISTANT

## 2019-12-05 NOTE — PROGRESS NOTES
1  Benign prostatic hyperplasia with nocturia  POCT Measure PVR       Assessment and plan:     1  BPH w Medically refractory lower urinary tract symptoms - managed by Dr Deloris Colbert  - failed prior alpha blockade and anticholinergics  - s/p Urolift (11/7/19)    Patient noting some mild improvement from Uro lift, with some residual irritative voiding symptoms  Reassurance was provided the urinary frequency and urgency can be common in the acute postoperative setting after Uro lift  Reviewed proper hydration, dietary modifications, behavioral changes to minimize these symptoms  Patient will follow up in 6 months for symptom reassessment  Encouraged to contact us in the interim with any concerns  All questions answered  Suzanne Ling PA-C      Chief Complaint     Urolift follow up  History of Present Illness     Lizette Graves is a 60 y o  male patient of Dr Deloris Colbert with a history of BPH with LUTS /sp Urolift (11/7/19) presenting for follow up  In brief, Roosevelt Moyer describes a longstanding history of urinary frequency and urgency  He states this dates back to childhood  His symptoms have worsened over the past few years  He has been managed by an additional urologist   He has previously trialed and failed medical management for both for BPH as well as overactive bladder  Does have a family history of prostate cancer in his father  Last PSA 2 73 (2/11/19)  Underwent Urolift (11/7/19)  Patient reports improvement of his urinary stream   He does endorse urinary frequency, or urgency which he finds bothersome at this time  He has noticed some improvement the symptoms over the past few days however  Denies any dysuria, gross hematuria, suprapubic pressure, flank pain, fevers, chills  Denies any urinary incontinence  Patient had just voided prior to examination so uroflow unable to be obtained  PVR 21mL       AUA SYMPTOM SCORE      Most Recent Value   AUA SYMPTOM SCORE   How often have you had a sensation of not emptying your bladder completely after you finished urinating? 5   How often have you had to urinate again less than two hours after you finished urinating? 4   How often have you found you stopped and started again several times when you urinate? 4   How often have you found it difficult to postpone urination? 4   How often have you had a weak urinary stream?  4   How often have you had to push or strain to begin urination? 4   How many times did you most typically get up to urinate from the time you went to bed at night until the time you got up in the morning? 1   Quality of Life: If you were to spend the rest of your life with your urinary condition just the way it is now, how would you feel about that?  3   AUA SYMPTOM SCORE  26          Review of Systems     Review of Systems   Constitutional: Negative for activity change and fatigue  HENT: Negative for congestion  Eyes: Negative for visual disturbance  Respiratory: Negative for shortness of breath and wheezing  Cardiovascular: Negative for chest pain and leg swelling  Gastrointestinal: Negative for abdominal pain  Endocrine: Positive for polyuria  Genitourinary: Positive for frequency and urgency  Negative for dysuria, flank pain and hematuria  Musculoskeletal: Negative for back pain  Allergic/Immunologic: Negative for immunocompromised state  Neurological: Negative for dizziness and numbness  Psychiatric/Behavioral: Negative for dysphoric mood  All other systems reviewed and are negative  Allergies     Allergies   Allergen Reactions    Aspirin      Stomach pain      Halobetasol Hallucinations    Olanzapine Hallucinations       Physical Exam     Physical Exam   Constitutional: He is oriented to person, place, and time  He appears well-developed and well-nourished  No distress  Anxious   HENT:   Head: Normocephalic and atraumatic  Eyes: EOM are normal    Neck: Normal range of motion  Cardiovascular:   Overweight   Pulmonary/Chest: Effort normal and breath sounds normal    Abdominal: Soft  Genitourinary: Penis normal    Genitourinary Comments: Negative CVA tenderness, negative suprapubic tenderness   Musculoskeletal: Normal range of motion  Neurological: He is alert and oriented to person, place, and time  Skin: Skin is warm  Psychiatric: He has a normal mood and affect   His behavior is normal            Vital Signs  Vitals:    12/05/19 1001   BP: 140/80   Pulse: 103   Weight: 108 kg (237 lb)   Height: 5' 10" (1 778 m)         Current Medications       Current Outpatient Medications:     albuterol (PROVENTIL HFA) 90 mcg/act inhaler, Inhale 2 puffs every 6 (six) hours as needed, Disp: , Rfl:     busPIRone (BUSPAR) 10 mg tablet, Take by mouth, Disp: , Rfl:     cyclobenzaprine (FLEXERIL) 10 mg tablet, , Disp: , Rfl:     ibuprofen (MOTRIN) 600 mg tablet, , Disp: , Rfl:     omeprazole (PriLOSEC) 20 mg delayed release capsule, , Disp: , Rfl:     venlafaxine (EFFEXOR-XR) 150 mg 24 hr capsule, Take 150 mg by mouth daily, Disp: , Rfl:     docusate sodium (COLACE) 100 mg capsule, Take 1 capsule (100 mg total) by mouth 2 (two) times a day for 15 days, Disp: 30 capsule, Rfl: 0    Levothyroxine Sodium (TIROSINT) 75 MCG CAPS, Take 75 mcg by mouth daily, Disp: , Rfl:     naproxen (EC NAPROSYN) 500 MG EC tablet, Take 1 tablet (500 mg total) by mouth 2 (two) times a day with meals for 5 days, Disp: 10 tablet, Rfl: 0      Active Problems     Patient Active Problem List   Diagnosis    Benign prostatic hyperplasia with nocturia         Past Medical History     Past Medical History:   Diagnosis Date    Anxiety     Arthritis     Colon polyp     Disease of thyroid gland     Muscle weakness     Shortness of breath          Surgical History     Past Surgical History:   Procedure Laterality Date    COLONOSCOPY      HERNIA REPAIR  2003    IA CYSTOURETHRO W/IMPLANT N/A 11/7/2019    Procedure: CYSTOSCOPY WITH INSERTION UROLIFT;  Surgeon: Jean Mccallum MD;  Location: [facility];  Service: Urology         Family History     History reviewed  No pertinent family history  Social History     Social History     Social History     Tobacco Use   Smoking Status Former Smoker    Last attempt to quit:     Years since quittin 9   Smokeless Tobacco Never Used         Pertinent Lab Values     No results found for: CREATININE    2 7    @RESULTRCNT(1H])@      Pertinent Imaging      - n/a    Portions of the record may have been created with voice recognition software   Occasional wrong word or "sound a like" substitutions may have occurred due to the inherent limitations of voice recognition software   Read the chart carefully and recognize, using context, where substitutions have occurred

## 2020-06-17 ENCOUNTER — TELEMEDICINE (OUTPATIENT)
Dept: UROLOGY | Facility: CLINIC | Age: 60
End: 2020-06-17
Payer: COMMERCIAL

## 2020-06-17 DIAGNOSIS — Z12.5 SCREENING FOR PROSTATE CANCER: Primary | ICD-10-CM

## 2020-06-17 PROCEDURE — 99442 PR PHYS/QHP TELEPHONE EVALUATION 11-20 MIN: CPT | Performed by: PHYSICIAN ASSISTANT

## 2020-07-31 ENCOUNTER — HOSPITAL ENCOUNTER (INPATIENT)
Facility: HOSPITAL | Age: 60
LOS: 7 days | Discharge: HOME | DRG: 812 | End: 2020-08-07
Attending: EMERGENCY MEDICINE | Admitting: INTERNAL MEDICINE
Payer: COMMERCIAL

## 2020-07-31 ENCOUNTER — APPOINTMENT (EMERGENCY)
Dept: RADIOLOGY | Facility: HOSPITAL | Age: 60
DRG: 812 | End: 2020-07-31
Payer: COMMERCIAL

## 2020-07-31 DIAGNOSIS — R06.00 DYSPNEA: Primary | ICD-10-CM

## 2020-07-31 DIAGNOSIS — D64.9 ANEMIA: ICD-10-CM

## 2020-07-31 DIAGNOSIS — K92.2 GI BLEED: ICD-10-CM

## 2020-07-31 DIAGNOSIS — T14.8XXA BRUISING: ICD-10-CM

## 2020-07-31 DIAGNOSIS — D64.9 SYMPTOMATIC ANEMIA: ICD-10-CM

## 2020-07-31 DIAGNOSIS — R07.9 CHEST PAIN: ICD-10-CM

## 2020-07-31 PROBLEM — N32.81 OAB (OVERACTIVE BLADDER): Status: ACTIVE | Noted: 2019-05-03

## 2020-07-31 PROBLEM — E03.8 HYPOTHYROIDISM DUE TO HASHIMOTO'S THYROIDITIS: Status: ACTIVE | Noted: 2018-04-08

## 2020-07-31 PROBLEM — E06.3 HYPOTHYROIDISM DUE TO HASHIMOTO'S THYROIDITIS: Status: ACTIVE | Noted: 2018-04-08

## 2020-07-31 PROBLEM — R60.0 LOWER EXTREMITY EDEMA: Status: ACTIVE | Noted: 2020-07-31

## 2020-07-31 PROBLEM — M62.838 MUSCLE SPASM: Chronic | Status: ACTIVE | Noted: 2020-07-31

## 2020-07-31 PROBLEM — E87.1 HYPONATREMIA: Status: ACTIVE | Noted: 2020-07-31

## 2020-07-31 PROBLEM — F41.9 ANXIETY DISORDER: Status: ACTIVE | Noted: 2017-12-19

## 2020-07-31 LAB
ALBUMIN SERPL BCP-MCNC: 3 G/DL (ref 3.5–5)
ALP SERPL-CCNC: 78 U/L (ref 46–116)
ALT SERPL W P-5'-P-CCNC: 19 U/L (ref 12–78)
ANION GAP SERPL CALCULATED.3IONS-SCNC: 8 MMOL/L (ref 4–13)
APTT PPP: 28 SECONDS (ref 23–37)
AST SERPL W P-5'-P-CCNC: 35 U/L (ref 5–45)
BASOPHILS # BLD AUTO: 0.02 THOUSANDS/ΜL (ref 0–0.1)
BASOPHILS NFR BLD AUTO: 0 % (ref 0–1)
BILIRUB SERPL-MCNC: 1.2 MG/DL (ref 0.2–1)
BUN SERPL-MCNC: 17 MG/DL (ref 5–25)
CALCIUM SERPL-MCNC: 7.8 MG/DL (ref 8.3–10.1)
CHLORIDE SERPL-SCNC: 99 MMOL/L (ref 100–108)
CO2 SERPL-SCNC: 24 MMOL/L (ref 21–32)
CREAT SERPL-MCNC: 1.05 MG/DL (ref 0.6–1.3)
D DIMER PPP FEU-MCNC: 1.54 UG/ML FEU
EOSINOPHIL # BLD AUTO: 0.1 THOUSAND/ΜL (ref 0–0.61)
EOSINOPHIL NFR BLD AUTO: 2 % (ref 0–6)
ERYTHROCYTE [DISTWIDTH] IN BLOOD BY AUTOMATED COUNT: 12.7 % (ref 11.6–15.1)
GFR SERPL CREATININE-BSD FRML MDRD: 76 ML/MIN/1.73SQ M
GLUCOSE SERPL-MCNC: 115 MG/DL (ref 65–140)
HCT VFR BLD AUTO: 26 % (ref 36.5–49.3)
HGB BLD-MCNC: 8.6 G/DL (ref 12–17)
IMM GRANULOCYTES # BLD AUTO: 0.04 THOUSAND/UL (ref 0–0.2)
IMM GRANULOCYTES NFR BLD AUTO: 1 % (ref 0–2)
INR PPP: 1.17 (ref 0.84–1.19)
LYMPHOCYTES # BLD AUTO: 0.94 THOUSANDS/ΜL (ref 0.6–4.47)
LYMPHOCYTES NFR BLD AUTO: 16 % (ref 14–44)
MCH RBC QN AUTO: 30.2 PG (ref 26.8–34.3)
MCHC RBC AUTO-ENTMCNC: 33.1 G/DL (ref 31.4–37.4)
MCV RBC AUTO: 91 FL (ref 82–98)
MONOCYTES # BLD AUTO: 0.46 THOUSAND/ΜL (ref 0.17–1.22)
MONOCYTES NFR BLD AUTO: 8 % (ref 4–12)
NEUTROPHILS # BLD AUTO: 4.37 THOUSANDS/ΜL (ref 1.85–7.62)
NEUTS SEG NFR BLD AUTO: 73 % (ref 43–75)
NRBC BLD AUTO-RTO: 0 /100 WBCS
NT-PROBNP SERPL-MCNC: 299 PG/ML
PLATELET # BLD AUTO: 196 THOUSANDS/UL (ref 149–390)
PMV BLD AUTO: 8.8 FL (ref 8.9–12.7)
POTASSIUM SERPL-SCNC: 4.1 MMOL/L (ref 3.5–5.3)
PROT SERPL-MCNC: 6.2 G/DL (ref 6.4–8.2)
PROTHROMBIN TIME: 14.4 SECONDS (ref 11.6–14.5)
RBC # BLD AUTO: 2.85 MILLION/UL (ref 3.88–5.62)
SODIUM SERPL-SCNC: 131 MMOL/L (ref 136–145)
TROPONIN I SERPL-MCNC: <0.02 NG/ML
TSH SERPL DL<=0.05 MIU/L-ACNC: 4.91 UIU/ML (ref 0.36–3.74)
WBC # BLD AUTO: 5.93 THOUSAND/UL (ref 4.31–10.16)

## 2020-07-31 PROCEDURE — 99285 EMERGENCY DEPT VISIT HI MDM: CPT

## 2020-07-31 PROCEDURE — 86901 BLOOD TYPING SEROLOGIC RH(D): CPT | Performed by: PHYSICIAN ASSISTANT

## 2020-07-31 PROCEDURE — 85379 FIBRIN DEGRADATION QUANT: CPT | Performed by: EMERGENCY MEDICINE

## 2020-07-31 PROCEDURE — 93005 ELECTROCARDIOGRAM TRACING: CPT

## 2020-07-31 PROCEDURE — 80053 COMPREHEN METABOLIC PANEL: CPT | Performed by: EMERGENCY MEDICINE

## 2020-07-31 PROCEDURE — 36415 COLL VENOUS BLD VENIPUNCTURE: CPT | Performed by: PHYSICIAN ASSISTANT

## 2020-07-31 PROCEDURE — 83880 ASSAY OF NATRIURETIC PEPTIDE: CPT | Performed by: EMERGENCY MEDICINE

## 2020-07-31 PROCEDURE — 84484 ASSAY OF TROPONIN QUANT: CPT | Performed by: EMERGENCY MEDICINE

## 2020-07-31 PROCEDURE — 71046 X-RAY EXAM CHEST 2 VIEWS: CPT

## 2020-07-31 PROCEDURE — 85610 PROTHROMBIN TIME: CPT | Performed by: EMERGENCY MEDICINE

## 2020-07-31 PROCEDURE — 85025 COMPLETE CBC W/AUTO DIFF WBC: CPT | Performed by: EMERGENCY MEDICINE

## 2020-07-31 PROCEDURE — 84443 ASSAY THYROID STIM HORMONE: CPT | Performed by: EMERGENCY MEDICINE

## 2020-07-31 PROCEDURE — 99223 1ST HOSP IP/OBS HIGH 75: CPT | Performed by: INTERNAL MEDICINE

## 2020-07-31 PROCEDURE — 99285 EMERGENCY DEPT VISIT HI MDM: CPT | Performed by: EMERGENCY MEDICINE

## 2020-07-31 PROCEDURE — 85730 THROMBOPLASTIN TIME PARTIAL: CPT | Performed by: EMERGENCY MEDICINE

## 2020-07-31 PROCEDURE — 86900 BLOOD TYPING SEROLOGIC ABO: CPT | Performed by: PHYSICIAN ASSISTANT

## 2020-07-31 PROCEDURE — 86850 RBC ANTIBODY SCREEN: CPT | Performed by: PHYSICIAN ASSISTANT

## 2020-07-31 RX ORDER — BUSPIRONE HYDROCHLORIDE 5 MG/1
10 TABLET ORAL 3 TIMES DAILY
Status: DISCONTINUED | OUTPATIENT
Start: 2020-07-31 | End: 2020-08-07 | Stop reason: HOSPADM

## 2020-07-31 RX ORDER — PANTOPRAZOLE SODIUM 40 MG/1
40 TABLET, DELAYED RELEASE ORAL
Status: DISCONTINUED | OUTPATIENT
Start: 2020-08-01 | End: 2020-08-03

## 2020-07-31 RX ORDER — LEVOTHYROXINE SODIUM 0.07 MG/1
75 TABLET ORAL
Status: DISCONTINUED | OUTPATIENT
Start: 2020-08-01 | End: 2020-08-07 | Stop reason: HOSPADM

## 2020-07-31 RX ORDER — ONDANSETRON 2 MG/ML
4 INJECTION INTRAMUSCULAR; INTRAVENOUS EVERY 6 HOURS PRN
Status: DISCONTINUED | OUTPATIENT
Start: 2020-07-31 | End: 2020-08-07 | Stop reason: HOSPADM

## 2020-07-31 RX ORDER — MAGNESIUM HYDROXIDE/ALUMINUM HYDROXICE/SIMETHICONE 120; 1200; 1200 MG/30ML; MG/30ML; MG/30ML
30 SUSPENSION ORAL EVERY 6 HOURS PRN
Status: DISCONTINUED | OUTPATIENT
Start: 2020-07-31 | End: 2020-08-07 | Stop reason: HOSPADM

## 2020-07-31 RX ORDER — ACETAMINOPHEN 325 MG/1
650 TABLET ORAL EVERY 6 HOURS PRN
Status: DISCONTINUED | OUTPATIENT
Start: 2020-07-31 | End: 2020-08-07 | Stop reason: HOSPADM

## 2020-07-31 RX ORDER — VENLAFAXINE HYDROCHLORIDE 150 MG/1
150 CAPSULE, EXTENDED RELEASE ORAL DAILY
Status: DISCONTINUED | OUTPATIENT
Start: 2020-08-01 | End: 2020-08-07 | Stop reason: HOSPADM

## 2020-07-31 RX ORDER — SODIUM CHLORIDE 9 MG/ML
50 INJECTION, SOLUTION INTRAVENOUS CONTINUOUS
Status: DISCONTINUED | OUTPATIENT
Start: 2020-07-31 | End: 2020-08-01

## 2020-07-31 RX ORDER — DOCUSATE SODIUM 100 MG/1
100 CAPSULE, LIQUID FILLED ORAL 2 TIMES DAILY PRN
Status: DISCONTINUED | OUTPATIENT
Start: 2020-07-31 | End: 2020-08-07 | Stop reason: HOSPADM

## 2020-07-31 RX ORDER — CYCLOBENZAPRINE HCL 10 MG
10 TABLET ORAL
Status: DISCONTINUED | OUTPATIENT
Start: 2020-08-01 | End: 2020-08-07 | Stop reason: HOSPADM

## 2020-07-31 RX ADMIN — SODIUM CHLORIDE 50 ML/HR: 0.9 INJECTION, SOLUTION INTRAVENOUS at 22:11

## 2020-07-31 RX ADMIN — BUSPIRONE HYDROCHLORIDE 10 MG: 5 TABLET ORAL at 22:10

## 2020-07-31 NOTE — ED PROVIDER NOTES
History  Chief Complaint   Patient presents with    Shortness of Breath     Patient presents to ED with SOB x 1 month and weakness  Patient reports abnormal labs "my hgb, hct and RBC are low " PCP instructed to come to ED  C/o sob for one month, getting worse over the past few days  +cp intermittent  No cough or fevers  No h/o CHF, COPD, PE  or asthma  Pt  Doesn't smoke  Sob is worse with exertion  His family dr  Did bloodwork on 7/28/20 and his hemoglobin/hematocrit is 11 3/33 2 and then he checked bloodwork again on 7/31/20 and his H/H was 9 9/28 8  No blood in stool, no black colored stool  Prior to Admission Medications   Prescriptions Last Dose Informant Patient Reported? Taking? Levothyroxine Sodium (TIROSINT) 75 MCG CAPS  Self Yes No   Sig: Take 75 mcg by mouth daily   busPIRone (BUSPAR) 10 mg tablet  Self Yes No   Sig: Take by mouth   cyclobenzaprine (FLEXERIL) 10 mg tablet  Self Yes No   docusate sodium (COLACE) 100 mg capsule   No No   Sig: Take 1 capsule (100 mg total) by mouth 2 (two) times a day for 15 days   ibuprofen (MOTRIN) 600 mg tablet  Self Yes No   naproxen (EC NAPROSYN) 500 MG EC tablet   No No   Sig: Take 1 tablet (500 mg total) by mouth 2 (two) times a day with meals for 5 days   omeprazole (PriLOSEC) 20 mg delayed release capsule  Self Yes No   venlafaxine (EFFEXOR-XR) 150 mg 24 hr capsule  Self Yes No   Sig: Take 150 mg by mouth daily      Facility-Administered Medications: None       Past Medical History:   Diagnosis Date    Anxiety     Arthritis     Colon polyp     Disease of thyroid gland     Muscle weakness     Shortness of breath        Past Surgical History:   Procedure Laterality Date    COLONOSCOPY      HERNIA REPAIR  2003    MD CYSTOURETHRO W/IMPLANT N/A 11/7/2019    Procedure: CYSTOSCOPY WITH INSERTION UROLIFT;  Surgeon: Munir Weaver MD;  Location: [facility];  Service: Urology       History reviewed  No pertinent family history    I have reviewed and agree with the history as documented  E-Cigarette/Vaping    E-Cigarette Use Never User      E-Cigarette/Vaping Substances     Social History     Tobacco Use    Smoking status: Former Smoker     Last attempt to quit: 2008     Years since quittin 5    Smokeless tobacco: Never Used   Substance Use Topics    Alcohol use: Never     Frequency: Never    Drug use: Never       Review of Systems   Constitutional: Negative for appetite change, fatigue and fever  HENT: Negative for rhinorrhea and sore throat  Respiratory: Positive for shortness of breath  Negative for cough and wheezing  Cardiovascular: Positive for chest pain  Negative for leg swelling  Gastrointestinal: Negative for abdominal pain, diarrhea and vomiting  Genitourinary: Negative for dysuria and flank pain  Musculoskeletal: Negative for back pain and neck pain  Skin: Negative for rash  Neurological: Negative for syncope and headaches  Psychiatric/Behavioral:        Mood normal       Physical Exam  Physical Exam   Constitutional: He is oriented to person, place, and time  He appears well-developed and well-nourished  HENT:   Head: Normocephalic and atraumatic  Neck: Normal range of motion  Neck supple  Cardiovascular: Normal rate and regular rhythm  Pulmonary/Chest: Effort normal and breath sounds normal  No respiratory distress  Abdominal: Soft  There is no tenderness  Genitourinary:   Genitourinary Comments: Rectal exam - heme neg  (control positive), no stool on card   Musculoskeletal: Normal range of motion  Neurological: He is alert and oriented to person, place, and time  Skin: Skin is warm and dry  Nursing note and vitals reviewed        Vital Signs  ED Triage Vitals [20 1849]   Temperature Pulse Respirations Blood Pressure SpO2   98 3 °F (36 8 °C) 100 (!) 23 133/79 100 %      Temp Source Heart Rate Source Patient Position - Orthostatic VS BP Location FiO2 (%)   Oral Monitor Sitting Right arm --      Pain Score       --           Vitals:    07/31/20 1849 07/31/20 2000   BP: 133/79 150/75   Pulse: 100 94   Patient Position - Orthostatic VS: Sitting Lying         Visual Acuity      ED Medications  Medications - No data to display    Diagnostic Studies  Results Reviewed     Procedure Component Value Units Date/Time    Protime-INR [[id number]]  (Normal) Collected:  07/31/20 1949    Lab Status:  Final result Specimen:  Blood from Arm, Right Updated:  07/31/20 2045     Protime 14 4 seconds      INR 1 17    APTT [[id number]]  (Normal) Collected:  07/31/20 1949    Lab Status:  Final result Specimen:  Blood from Arm, Right Updated:  07/31/20 2045     PTT 28 seconds     D-Dimer [[id number]]  (Abnormal) Collected:  07/31/20 1949    Lab Status:  Final result Specimen:  Blood from Arm, Right Updated:  07/31/20 2038     D-Dimer, Quant 1 54 ug/ml FEU     CBC and differential [[id number]]  (Abnormal) Collected:  07/31/20 2026    Lab Status:  Final result Specimen:  Blood from Arm, Right Updated:  07/31/20 2033     WBC 5 93 Thousand/uL      RBC 2 85 Million/uL      Hemoglobin 8 6 g/dL      Hematocrit 26 0 %      MCV 91 fL      MCH 30 2 pg      MCHC 33 1 g/dL      RDW 12 7 %      MPV 8 8 fL      Platelets 368 Thousands/uL      nRBC 0 /100 WBCs      Neutrophils Relative 73 %      Immat GRANS % 1 %      Lymphocytes Relative 16 %      Monocytes Relative 8 %      Eosinophils Relative 2 %      Basophils Relative 0 %      Neutrophils Absolute 4 37 Thousands/µL      Immature Grans Absolute 0 04 Thousand/uL      Lymphocytes Absolute 0 94 Thousands/µL      Monocytes Absolute 0 46 Thousand/µL      Eosinophils Absolute 0 10 Thousand/µL      Basophils Absolute 0 02 Thousands/µL     Comprehensive metabolic panel [[id number]]  (Abnormal) Collected:  07/31/20 1944    Lab Status:  Final result Specimen:  Blood from Arm, Right Updated:  07/31/20 2020     Sodium 131 mmol/L      Potassium 4 1 mmol/L      Chloride 99 mmol/L      CO2 24 mmol/L      ANION GAP 8 mmol/L      BUN 17 mg/dL      Creatinine 1 05 mg/dL      Glucose 115 mg/dL      Calcium 7 8 mg/dL      AST 35 U/L      ALT 19 U/L      Alkaline Phosphatase 78 U/L      Total Protein 6 2 g/dL      Albumin 3 0 g/dL      Total Bilirubin 1 20 mg/dL      eGFR 76 ml/min/1 73sq m     Narrative:       [place] guidelines for Chronic Kidney Disease (CKD):     Stage 1 with normal or high GFR (GFR > 90 mL/min/1 73 square meters)    Stage 2 Mild CKD (GFR = 60-89 mL/min/1 73 square meters)    Stage 3A Moderate CKD (GFR = 45-59 mL/min/1 73 square meters)    Stage 3B Moderate CKD (GFR = 30-44 mL/min/1 73 square meters)    Stage 4 Severe CKD (GFR = 15-29 mL/min/1 73 square meters)    Stage 5 End Stage CKD (GFR <15 mL/min/1 73 square meters)  Note: GFR calculation is accurate only with a steady state creatinine    NT-BNP PRO [[id number]]  (Abnormal) Collected:  07/31/20 1944    Lab Status:  Final result Specimen:  Blood from Arm, Right Updated:  07/31/20 2020     NT-proBNP 299 pg/mL     TSH [[id number]]  (Abnormal) Collected:  07/31/20 1944    Lab Status:  Final result Specimen:  Blood from Arm, Right Updated:  07/31/20 2020     TSH 3RD GENERATON 4 913 uIU/mL     Narrative:       Patients undergoing fluorescein dye angiography may retain small amounts of fluorescein in the body for 48-72 hours post procedure  Samples containing fluorescein can produce falsely depressed TSH values  If the patient had this procedure,a specimen should be resubmitted post fluorescein clearance  Troponin I [[id number]]  (Normal) Collected:  07/31/20 1944    Lab Status:  Final result Specimen:  Blood from Arm, Right Updated:  07/31/20 2013     Troponin I <0 02 ng/mL                  XR chest 2 views   Final Result by Nubia Kahn MD (07/31 2011)      No acute cardiopulmonary disease              Workstation performed: [id number]                    Procedures  Procedures         ED Course US AUDIT      Most Recent Value   Initial Alcohol Screen: US AUDIT-C    1  How often do you have a drink containing alcohol?  0 Filed at: 07/31/2020 1850   Audit-C Score  0 Filed at: 07/31/2020 1850                  THOMAS/DAST-10      Most Recent Value   How many times in the past year have you    Used an illegal drug or used a prescription medication for non-medical reasons? Never Filed at: 07/31/2020 1850                                MDM  Number of Diagnoses or Management Options  Anemia:   Chest pain:   Dyspnea:      Amount and/or Complexity of Data Reviewed  Clinical lab tests: ordered and reviewed  Tests in the radiology section of CPT®: ordered and reviewed    Risk of Complications, Morbidity, and/or Mortality  Presenting problems: moderate  General comments: Pt  Was admitted for further workup  H/H from today is 8 6 - SLIM will continue to monitor H/H as an inpt  He doesn't need a blood transfusion right now  Heme occult neg  Rectal exam, VSS          Disposition  Final diagnoses:   Dyspnea   Anemia   Chest pain     Time reflects when diagnosis was documented in both MDM as applicable and the Disposition within this note     Time User Action Codes Description Comment    7/31/2020  8:50 PM Alec Floresa Add [R06 00] Dyspnea     7/31/2020  8:50 PM Alec Baba Add [D64 9] Anemia     7/31/2020  8:50 PM Alec Baba Add [R07 9] Chest pain       ED Disposition     ED Disposition Condition Date/Time Comment    Admit Stable Fri Jul 31, 2020  8:50 PM Case was discussed with FAUSTO  and the patient's admission status was agreed to be inpt /tele        Follow-up Information    None         Patient's Medications   Discharge Prescriptions    No medications on file     No discharge procedures on file      PDMP Review     None          ED Provider  Electronically Signed by           Festus Frias MD  07/31/20 3018

## 2020-08-01 ENCOUNTER — APPOINTMENT (INPATIENT)
Dept: ULTRASOUND IMAGING | Facility: HOSPITAL | Age: 60
DRG: 812 | End: 2020-08-01
Payer: COMMERCIAL

## 2020-08-01 PROBLEM — D64.9 SYMPTOMATIC ANEMIA: Status: ACTIVE | Noted: 2020-07-31

## 2020-08-01 LAB
ABO GROUP BLD: NORMAL
ABO GROUP BLD: NORMAL
ALBUMIN SERPL BCP-MCNC: 2.8 G/DL (ref 3.5–5)
ALP SERPL-CCNC: 73 U/L (ref 46–116)
ALT SERPL W P-5'-P-CCNC: 16 U/L (ref 12–78)
ANION GAP SERPL CALCULATED.3IONS-SCNC: 7 MMOL/L (ref 4–13)
AST SERPL W P-5'-P-CCNC: 19 U/L (ref 5–45)
BILIRUB SERPL-MCNC: 1.4 MG/DL (ref 0.2–1)
BLD GP AB SCN SERPL QL: NEGATIVE
BUN SERPL-MCNC: 15 MG/DL (ref 5–25)
CALCIUM SERPL-MCNC: 7.9 MG/DL (ref 8.3–10.1)
CHLORIDE SERPL-SCNC: 101 MMOL/L (ref 100–108)
CO2 SERPL-SCNC: 24 MMOL/L (ref 21–32)
CREAT SERPL-MCNC: 1.02 MG/DL (ref 0.6–1.3)
ERYTHROCYTE [DISTWIDTH] IN BLOOD BY AUTOMATED COUNT: 12.9 % (ref 11.6–15.1)
GFR SERPL CREATININE-BSD FRML MDRD: 79 ML/MIN/1.73SQ M
GLUCOSE SERPL-MCNC: 114 MG/DL (ref 65–140)
HCT VFR BLD AUTO: 25.5 % (ref 36.5–49.3)
HCT VFR BLD AUTO: 25.5 % (ref 36.5–49.3)
HCT VFR BLD AUTO: 27.9 % (ref 36.5–49.3)
HGB BLD-MCNC: 8.2 G/DL (ref 12–17)
HGB BLD-MCNC: 8.3 G/DL (ref 12–17)
HGB BLD-MCNC: 9 G/DL (ref 12–17)
MCH RBC QN AUTO: 29.4 PG (ref 26.8–34.3)
MCHC RBC AUTO-ENTMCNC: 32.2 G/DL (ref 31.4–37.4)
MCV RBC AUTO: 91 FL (ref 82–98)
PLATELET # BLD AUTO: 192 THOUSANDS/UL (ref 149–390)
PMV BLD AUTO: 8.9 FL (ref 8.9–12.7)
POTASSIUM SERPL-SCNC: 3.7 MMOL/L (ref 3.5–5.3)
PROT SERPL-MCNC: 5.8 G/DL (ref 6.4–8.2)
RBC # BLD AUTO: 2.79 MILLION/UL (ref 3.88–5.62)
RH BLD: POSITIVE
RH BLD: POSITIVE
SODIUM SERPL-SCNC: 132 MMOL/L (ref 136–145)
SPECIMEN EXPIRATION DATE: NORMAL
T4 FREE SERPL-MCNC: 1.26 NG/DL (ref 0.76–1.46)
WBC # BLD AUTO: 5.34 THOUSAND/UL (ref 4.31–10.16)

## 2020-08-01 PROCEDURE — 83918 ORGANIC ACIDS TOTAL QUANT: CPT | Performed by: INTERNAL MEDICINE

## 2020-08-01 PROCEDURE — 93971 EXTREMITY STUDY: CPT

## 2020-08-01 PROCEDURE — 83540 ASSAY OF IRON: CPT | Performed by: INTERNAL MEDICINE

## 2020-08-01 PROCEDURE — 99233 SBSQ HOSP IP/OBS HIGH 50: CPT | Performed by: INTERNAL MEDICINE

## 2020-08-01 PROCEDURE — 82728 ASSAY OF FERRITIN: CPT | Performed by: INTERNAL MEDICINE

## 2020-08-01 PROCEDURE — 84439 ASSAY OF FREE THYROXINE: CPT | Performed by: PHYSICIAN ASSISTANT

## 2020-08-01 PROCEDURE — 82747 ASSAY OF FOLIC ACID RBC: CPT | Performed by: INTERNAL MEDICINE

## 2020-08-01 PROCEDURE — 85027 COMPLETE CBC AUTOMATED: CPT | Performed by: PHYSICIAN ASSISTANT

## 2020-08-01 PROCEDURE — 83550 IRON BINDING TEST: CPT | Performed by: INTERNAL MEDICINE

## 2020-08-01 PROCEDURE — 99223 1ST HOSP IP/OBS HIGH 75: CPT | Performed by: INTERNAL MEDICINE

## 2020-08-01 PROCEDURE — 93971 EXTREMITY STUDY: CPT | Performed by: SURGERY

## 2020-08-01 PROCEDURE — 80053 COMPREHEN METABOLIC PANEL: CPT | Performed by: PHYSICIAN ASSISTANT

## 2020-08-01 PROCEDURE — 85014 HEMATOCRIT: CPT | Performed by: PHYSICIAN ASSISTANT

## 2020-08-01 PROCEDURE — 85018 HEMOGLOBIN: CPT | Performed by: PHYSICIAN ASSISTANT

## 2020-08-01 RX ADMIN — BUSPIRONE HYDROCHLORIDE 10 MG: 5 TABLET ORAL at 09:13

## 2020-08-01 RX ADMIN — PANTOPRAZOLE SODIUM 40 MG: 40 TABLET, DELAYED RELEASE ORAL at 17:00

## 2020-08-01 RX ADMIN — BUSPIRONE HYDROCHLORIDE 10 MG: 5 TABLET ORAL at 17:00

## 2020-08-01 RX ADMIN — CYCLOBENZAPRINE HYDROCHLORIDE 10 MG: 10 TABLET, FILM COATED ORAL at 21:57

## 2020-08-01 RX ADMIN — PANTOPRAZOLE SODIUM 40 MG: 40 TABLET, DELAYED RELEASE ORAL at 05:47

## 2020-08-01 RX ADMIN — LEVOTHYROXINE SODIUM 75 MCG: 75 TABLET ORAL at 05:47

## 2020-08-01 RX ADMIN — BUSPIRONE HYDROCHLORIDE 10 MG: 5 TABLET ORAL at 21:57

## 2020-08-01 RX ADMIN — VENLAFAXINE HYDROCHLORIDE 150 MG: 150 CAPSULE, EXTENDED RELEASE ORAL at 09:13

## 2020-08-01 NOTE — ASSESSMENT & PLAN NOTE
Patient reports "no food" for the past 2 days  Suspect he may be dehydrated  Will provide some fluids especially in view of patient going for colonoscopy today

## 2020-08-01 NOTE — ASSESSMENT & PLAN NOTE
Known history of anxiety and patient presents with anxious affect  Continue with home medications, such as Effexor and BuSpar  Avoid benzodiazepines

## 2020-08-01 NOTE — ED NOTES
CC- SOB    Orientation status- A/Ox4    Abnormal labs/abnormal focused assessment/vitals- Low h/h, elevated d-dimer, bnp & tsh  Lung fields clear      Medication/drips- None     IV lines/drains/etc- 20 Right AC    Skin- Intact, clean, dry    BMAT screening tool- Level 4     ED nurse's name and phone numberMabeline Seip 130 Randa Irina Blair, RN  07/31/20 2993

## 2020-08-01 NOTE — ASSESSMENT & PLAN NOTE
Iron deficiency anemia  EGD did not reveal any source of bleeding  Plan for colonoscopy on 08/04/2020  Continue with IV iron  He would need iron supplement on discharge  Consulted hematology due to history of excessive bruising and also anemia

## 2020-08-01 NOTE — ASSESSMENT & PLAN NOTE
· Patient reporting chronic lower midline chest muscle spasm pain near xiphoid process without radiation, patient also reports history of GERD  · Reports he has had this my whole life and denies any new changes in the spasms or pain, intermittent, no recent worsening, so no suspicion at this time of any cardiac interference  · EKG revealing sinus tachycardia, troponin negative, chest x-ray negative  · Continue home med of Flexeril

## 2020-08-01 NOTE — ASSESSMENT & PLAN NOTE
· Slightly decreased at 131  · Gentle IV fluid hydration at 50 mL/hr for 8 hours  · Recheck CMP in a m

## 2020-08-01 NOTE — CONSULTS
Consultation - 126 [facility]  Millie Mccullough 63 y o  male MRN: [id number]  Unit/Bed#: -01 Encounter: [id number]        Consults    Reason for Consult / Principal Problem:     Anemia      ASSESSMENT AND PLAN:      Anemia:  He has anemia with a low iron saturation but a normal ferritin indicating likely has anemia of chronic disease  He has not had any gross evidence of bleeding, so I suspect he has had slow chronic bleeding from his gastrointestinal tract contributing to this anemia or that it is multifactorial   Based on his history I am most concerned about peptic ulcer disease from NSAID use , but AVMs, polyps , and malignancy are also on the differential   I suggested an upper endoscopy and colonoscopy but he declined colonoscopy because he has had difficulty tolerating bowel preparation in the past   I will plan for an upper endoscopy on Monday  I will resume his diet now but keep him NPO after midnight on Sunday night  In the meantime I will continue Protonix and have asked him to avoid all NSAIDs     ______________________________________________________________________    HPI:  He presented to the hospital with dyspnea on exertion and fatigue over the past month  He has not had any reflux, nausea, vomiting, abdominal pain, change in bowel habits, hematochezia, melena, or weight loss  He was noted to have anemia as he went for a baseline hemoglobin of 11 to a hemoglobin of 8 6  His stool here is Hemoccult negative and his iron saturation is 15% with a ferritin of 131  He said he has taken ibuprofen for many years and over the past few months he has been taking a lot of meloxicam     He believes his last upper endoscopy and colonoscopy were performed approximately five years ago and that they were both unremarkable  He denies any family history of colon polyps or colon cancer        REVIEW OF SYSTEMS:    CONSTITUTIONAL: Denies any fever, chills, rigors, and weight loss, but has fatigue  HEENT: No earache or tinnitus  Denies hearing loss or visual disturbances  CARDIOVASCULAR: No chest pain or palpitations  RESPIRATORY: Denies any cough, hemoptysis, but has dyspnea on exertion  GASTROINTESTINAL: As noted in the History of Present Illness  GENITOURINARY: No problems with urination  Denies any hematuria or dysuria  NEUROLOGIC: No dizziness or vertigo, denies headaches  MUSCULOSKELETAL: Denies any muscle or joint pain  SKIN: Denies skin rashes or itching  ENDOCRINE: Denies excessive thirst  Denies intolerance to heat or cold  PSYCHOSOCIAL: Denies depression or anxiety  Denies any recent memory loss  Historical Information   Past Medical History:   Diagnosis Date    Anxiety     Arthritis     Colon polyp     Disease of thyroid gland     Muscle weakness     Shortness of breath      Past Surgical History:   Procedure Laterality Date    COLONOSCOPY      HERNIA REPAIR      [place] CYSTOURETHRO W/IMPLANT N/A 2019    Procedure: CYSTOSCOPY WITH INSERTION Jamal Pitcher;  Surgeon: Grady Conrad MD;  Location: [facility];  Service: Urology     Social History   Social History     Substance and Sexual Activity   Alcohol Use Never    Frequency: Never     Social History     Substance and Sexual Activity   Drug Use Never     Social History     Tobacco Use   Smoking Status Former Smoker    Last attempt to quit:     Years since quittin 5   Smokeless Tobacco Never Used     History reviewed  No pertinent family history      Meds/Allergies     Medications Prior to Admission   Medication    busPIRone (BUSPAR) 10 mg tablet    cyclobenzaprine (FLEXERIL) 10 mg tablet    Levothyroxine Sodium (TIROSINT) 75 MCG CAPS    omeprazole (PriLOSEC) 20 mg delayed release capsule    venlafaxine (EFFEXOR-XR) 150 mg 24 hr capsule    docusate sodium (COLACE) 100 mg capsule    ibuprofen (MOTRIN) 600 mg tablet    naproxen (EC NAPROSYN) 500 MG EC tablet     Current Facility-Administered Medications   Medication Dose Route Frequency    acetaminophen (TYLENOL) tablet 650 mg  650 mg Oral Q6H PRN    aluminum-magnesium hydroxide-simethicone (MYLANTA) 200-200-20 mg/5 mL oral suspension 30 mL  30 mL Oral Q6H PRN    busPIRone (BUSPAR) tablet 10 mg  10 mg Oral TID    cyclobenzaprine (FLEXERIL) tablet 10 mg  10 mg Oral HS    docusate sodium (COLACE) capsule 100 mg  100 mg Oral BID PRN    levothyroxine tablet 75 mcg  75 mcg Oral Early Morning    ondansetron (ZOFRAN) injection 4 mg  4 mg Intravenous Q6H PRN    pantoprazole (PROTONIX) EC tablet 40 mg  40 mg Oral BID AC    venlafaxine (EFFEXOR-XR) 24 hr capsule 150 mg  150 mg Oral Daily       Allergies   Allergen Reactions    Aspirin      Stomach pain      Halobetasol Hallucinations    Olanzapine Hallucinations           Objective     Blood pressure 128/74, pulse 82, temperature 98 2 °F (36 8 °C), resp  rate 18, height 5' 10" (1 778 m), weight 112 kg (246 lb 14 6 oz), SpO2 100 %  Body mass index is 35 43 kg/m²  Intake/Output Summary (Last 24 hours) at 8/1/2020 1045  Last data filed at 7/31/2020 2210  Gross per 24 hour   Intake 960 ml   Output --   Net 960 ml         PHYSICAL EXAM:      General Appearance:   Alert, cooperative, no distress   HEENT:   Normocephalic, atraumatic, anicteric      Neck:  Supple, symmetrical, trachea midline   Lungs:   Clear to auscultation bilaterally; no rales, rhonchi or wheezing; respirations unlabored    Heart[de-identified]   Regular rate and rhythm; no murmur, rub, or gallop     Abdomen:   Soft, obese, non-tender, non-distended; normal bowel sounds; no masses, no organomegaly    Genitalia:   Deferred    Rectal:   Deferred    Extremities:  No cyanosis, clubbing, but has 2+ LE edema    Pulses:  2+ and symmetric all extremities    Skin:  No jaundice, rashes, or lesions    Lymph nodes:  No palpable cervical lymphadenopathy        Lab Results:   Admission on 07/31/2020   Component Date Value    WBC 07/31/2020 5 93     RBC 07/31/2020 2 85*    Hemoglobin 07/31/2020 8 6*    Hematocrit 07/31/2020 26 0*    MCV 07/31/2020 91     MCH 07/31/2020 30 2     MCHC 07/31/2020 33 1     RDW 07/31/2020 12 7     MPV 07/31/2020 8 8*    Platelets 19/42/3903 196     nRBC 07/31/2020 0     Neutrophils Relative 07/31/2020 73     Immat GRANS % 07/31/2020 1     Lymphocytes Relative 07/31/2020 16     Monocytes Relative 07/31/2020 8     Eosinophils Relative 07/31/2020 2     Basophils Relative 07/31/2020 0     Neutrophils Absolute 07/31/2020 4 37     Immature Grans Absolute 07/31/2020 0 04     Lymphocytes Absolute 07/31/2020 0 94     Monocytes Absolute 07/31/2020 0 46     Eosinophils Absolute 07/31/2020 0 10     Basophils Absolute 07/31/2020 0 02     Protime 07/31/2020 14 4     INR 07/31/2020 1 17     PTT 07/31/2020 28     Sodium 07/31/2020 131*    Potassium 07/31/2020 4 1     Chloride 07/31/2020 99*    CO2 07/31/2020 24     ANION GAP 07/31/2020 8     BUN 07/31/2020 17     Creatinine 07/31/2020 1 05     Glucose 07/31/2020 115     Calcium 07/31/2020 7 8*    AST 07/31/2020 35     ALT 07/31/2020 19     Alkaline Phosphatase 07/31/2020 78     Total Protein 07/31/2020 6 2*    Albumin 07/31/2020 3 0*    Total Bilirubin 07/31/2020 1 20*    eGFR 07/31/2020 76     Troponin I 07/31/2020 <0 02     NT-proBNP 07/31/2020 299*    TSH 3RD GENERATON 07/31/2020 4 913*    D-Dimer, Quant 07/31/2020 1 54*    ABO Grouping 07/31/2020 A     Rh Factor 07/31/2020 Positive     Antibody Screen 07/31/2020 Negative     Specimen Expiration Date 07/31/2020 84461805     ABO Grouping 07/31/2020 A     Rh Factor 07/31/2020 Positive     Hemoglobin 08/01/2020 9 0*    Hematocrit 08/01/2020 27 9*    Sodium 08/01/2020 132*    Potassium 08/01/2020 3 7     Chloride 08/01/2020 101     CO2 08/01/2020 24     ANION GAP 08/01/2020 7     BUN 08/01/2020 15     Creatinine 08/01/2020 1 02     Glucose 08/01/2020 114     Calcium 08/01/2020 7 9*    AST 08/01/2020 19     ALT 08/01/2020 16     Alkaline Phosphatase 08/01/2020 73     Total Protein 08/01/2020 5 8*    Albumin 08/01/2020 2 8*    Total Bilirubin 08/01/2020 1 40*    eGFR 08/01/2020 79     WBC 08/01/2020 5 34     RBC 08/01/2020 2 79*    Hemoglobin 08/01/2020 8 2*    Hematocrit 08/01/2020 25 5*    MCV 08/01/2020 91     MCH 08/01/2020 29 4     MCHC 08/01/2020 32 2     RDW 08/01/2020 12 9     Platelets 10/93/6349 192     MPV 08/01/2020 8 9     Hemoglobin 08/01/2020 8 3*    Hematocrit 08/01/2020 25 5*       Imaging Studies: I have personally reviewed pertinent imaging studies

## 2020-08-01 NOTE — ASSESSMENT & PLAN NOTE
· Patient admits to a fall out of the shower on 07/08, admitting since then to ecchymosis of right lower extremity near knee, swelling, ttp, no issues walking  · D-dimer is elevated at 1 54, but most likely related to decreasing hemoglobin  · Rule out DVT with venous duplex of right leg due to +2 pitting edema, erythema, ttp

## 2020-08-01 NOTE — UTILIZATION REVIEW
Initial Clinical Review    Admission: Date/Time/Statement: Admission Orders (From admission, onward)     Ordered        07/31/20 2049  Inpatient Admission (expected length of stay for this patient Order details is greater than two midnights)  Once                   Orders Placed This Encounter   Procedures    Inpatient Admission (expected length of stay for this patient Order details is greater than two midnights)     Standing Status:   Standing     Number of Occurrences:   1     Order Specific Question:   Admitting Physician     Answer:   Maylin Senior     Order Specific Question:   Level of Care     Answer:   Med Surg [16]     Order Specific Question:   Estimated length of stay     Answer:   More than 2 Midnights     Order Specific Question:   Certification     Answer:   I certify that inpatient services are medically necessary for this patient for a duration of greater than two midnights  See H&P and MD Progress Notes for additional information about the patient's course of treatment  ED Arrival Information     Expected Arrival Acuity Means of Arrival Escorted By Service Admission Type    - 7/31/2020 18:40 Emergent Walk-In Self Hospitalist Emergency    Arrival Complaint    Shortness Of [facility] Labs         Chief Complaint   Patient presents with    Shortness of Breath     Patient presents to ED with SOB x 1 month and weakness  Patient reports abnormal labs "my hgb, hct and RBC are low " PCP instructed to come to ED  Assessment/Plan: 64 yo male to ED from home w/ intermittent SOB x1 month   Came on suddenly and not sure why   Saw PCP and bld work revealed dec H&H   Instructed to go to ED   hgb 8 6 down from 11 3 on 7/28, denies melena or hematochezia   hemoccult neg   Admitted IP status close monitoring of H&H , GI consult , NPO   LE edema , D dimer elevated , r/o DVT w/ venous duplex +2 pitting edema and erythema , ttp   Hx of muscle spasms cont flexeril   Hyponatremia slightly dec 131 , gentle IVF and recheck CMP in am       PE : Erythema, +2 pitting edema, tenderness to palpation of right lower extremity below-the-knee  Ecchymosis noted of right knee     8/1 GI consult   anemia with a low iron saturation but a normal ferritin indicating likely has anemia of chronic disease  No evidence of gross bleeding   Most concern for peptic ulcer disease from NSAID use , , but AVMs, polyps , and malignancy are also on the differential   I suggested an upper endoscopy and colonoscopy plan for EGD on Monday , refused colonoscopy   Resume diet , keep NPO Sunday night   Cont protonix and avoid NSAIDS        ED Triage Vitals   Temperature Pulse Respirations Blood Pressure SpO2   07/31/20 1849 07/31/20 1849 07/31/20 1849 07/31/20 1849 07/31/20 1849   98 3 °F (36 8 °C) 100 (!) 23 133/79 100 %      Temp Source Heart Rate Source Patient Position - Orthostatic VS BP Location FiO2 (%)   07/31/20 1849 07/31/20 1849 07/31/20 1849 07/31/20 1849 --   Oral Monitor Sitting Right arm       Pain Score       07/31/20 2149       No Pain        Wt Readings from Last 1 Encounters:   07/31/20 112 kg (246 lb 14 6 oz)     Additional Vital Signs:   08/01/20 07:35:36  98 2 °F (36 8 °C)  82  18  128/74  92  100 %  --  --   08/01/20 0300  98 1 °F (36 7 °C)  88  16  130/66  --  97 %  None (Room air)  Lying   07/31/20 23:20:26  99 2 °F (37 3 °C)  94  --  133/65  88  98 %  --  --   07/31/20 21:47:15  98 3 °F (36 8 °C)  --  --  156/81  106  --  --  --   07/31/20 2000  --  94  19  150/75  106  100 %  None (Room air)         Pertinent Labs/Diagnostic Test Results:   8/1 venous duplex - pending   7/31 CXR No acute cardiopulmonary disease  Results from last 7 days   Lab Units 08/01/20  0902 08/01/20  0552 08/01/20  0241 07/31/20 2026   WBC Thousand/uL  --  5 34  --  5 93   HEMOGLOBIN g/dL 8 3* 8 2* 9 0* 8 6*   HEMATOCRIT % 25 5* 25 5* 27 9* 26 0*   PLATELETS Thousands/uL  --  192  --  196   NEUTROS ABS Thousands/µL  --   --   --  4 37     Results from last 7 days   Lab Units 08/01/20 0552 07/31/20  1944   SODIUM mmol/L 132* 131*   POTASSIUM mmol/L 3 7 4 1   CHLORIDE mmol/L 101 99*   CO2 mmol/L 24 24   ANION GAP mmol/L 7 8   BUN mg/dL 15 17   CREATININE mg/dL 1 02 1 05   EGFR ml/min/1 73sq m 79 76   CALCIUM mg/dL 7 9* 7 8*     Results from last 7 days   Lab Units 08/01/20  0552 07/31/20 1944   AST U/L 19 35   ALT U/L 16 19   ALK PHOS U/L 73 78   TOTAL PROTEIN g/dL 5 8* 6 2*   ALBUMIN g/dL 2 8* 3 0*   TOTAL BILIRUBIN mg/dL 1 40* 1 20*     Results from last 7 days   Lab Units 08/01/20 0552 07/31/20 1944   GLUCOSE RANDOM mg/dL 114 115     Results from last 7 days   Lab Units 07/31/20 1944   TROPONIN I ng/mL <0 02     Results from last 7 days   Lab Units 07/31/20 1949   D-DIMER QUANTITATIVE ug/ml FEU 1 54*     Results from last 7 days   Lab Units 07/31/20 1949   PROTIME seconds 14 4   INR  1 17   PTT seconds 28     Results from last 7 days   Lab Units 07/31/20  1944   TSH 3RD GENERATON uIU/mL 4 913*     Results from last 7 days   Lab Units 07/31/20 1944   NT-PRO BNP pg/mL 299*     ED Treatment:   Medication Administration from 07/31/2020 1839 to 07/31/2020 2142     None        Past Medical History:   Diagnosis Date    Anxiety     Arthritis     Colon polyp     Disease of thyroid gland     Muscle weakness     Shortness of breath      Present on Admission:   GI bleed   Hypothyroidism due to Hashimoto's thyroiditis   Anxiety disorder   Hyponatremia   Muscle spasm   Lower extremity edema      Admitting Diagnosis: Shortness of breath [R06 02]  Chest pain [R07 9]  Dyspnea [R06 00]  Anemia [D64 9]  GI bleed [K92 2]  Age/Sex: 63 y o  male  Admission Orders:  Scheduled Medications:    Medications:  busPIRone 10 mg Oral TID   cyclobenzaprine 10 mg Oral HS   levothyroxine 75 mcg Oral Early Morning   pantoprazole 40 mg Oral BID AC   venlafaxine 150 mg Oral Daily     Continuous IV Infusions:     PRN Meds:    acetaminophen 650 mg Oral Q6H PRN aluminum-magnesium hydroxide-simethicone 30 mL Oral Q6H PRN   docusate sodium 100 mg Oral BID PRN   ondansetron 4 mg Intravenous Q6H PRN     NPO   Up w / assist   Tele   Act as mariel  IP CONSULT TO CASE MANAGEMENT  IP CONSULT TO GASTROENTEROLOGY    Network Utilization Review Department  [email or web address]  ATTENTION: Please call with any questions or concerns to [phone] and carefully listen to the prompts so that you are directed to the right person  All voicemails are confidential   Ty Limb all requests for admission clinical reviews, approved or denied determinations and any other requests to dedicated fax number below belonging to the campus where the patient is receiving treatment   List of dedicated fax numbers for the Facilities:  [address] DENIALS (Administrative/Medical Necessity) [phone]   [address] (Maternity/NICU/Pediatrics) [phone]   Janice Magana [phone]   Julianne Philippe [phone]   [facility] [phone]   [address] [facility]  [phone]   [address] [place] [address] [address] [place] [address] [phone]   [facility]  [phone]   [address] [facility] [address]  [address] [facility] 1000 [address] [facility] [phone]

## 2020-08-01 NOTE — PLAN OF CARE
Problem: Potential for Falls  Goal: Patient will remain free of falls  Description  INTERVENTIONS:  - Assess patient frequently for physical needs  -  Identify cognitive and physical deficits and behaviors that affect risk of falls    -  Dodge fall precautions as indicated by assessment   - Educate patient/family on patient safety including physical limitations  - Instruct patient to call for assistance with activity based on assessment  - Modify environment to reduce risk of injury  - Consider OT/PT consult to assist with strengthening/mobility  Outcome: Progressing     Problem: PAIN - ADULT  Goal: Verbalizes/displays adequate comfort level or baseline comfort level  Description  Interventions:  - Encourage patient to monitor pain and request assistance  - Assess pain using appropriate pain scale  - Administer analgesics based on type and severity of pain and evaluate response  - Implement non-pharmacological measures as appropriate and evaluate response  - Consider cultural and social influences on pain and pain management  - Notify physician/advanced practitioner if interventions unsuccessful or patient reports new pain  Outcome: Progressing     Problem: INFECTION - ADULT  Goal: Absence or prevention of progression during hospitalization  Description  INTERVENTIONS:  - Assess and monitor for signs and symptoms of infection  - Monitor lab/diagnostic results  - Monitor all insertion sites, i e  indwelling lines, tubes, and drains  - Monitor endotracheal if appropriate and nasal secretions for changes in amount and color  - Dodge appropriate cooling/warming therapies per order  - Administer medications as ordered  - Instruct and encourage patient and family to use good hand hygiene technique  - Identify and instruct in appropriate isolation precautions for identified infection/condition  Outcome: Progressing  Goal: Absence of fever/infection during neutropenic period  Description  INTERVENTIONS:  - Monitor WBC    Outcome: Progressing     Problem: SAFETY ADULT  Goal: Patient will remain free of falls  Description  INTERVENTIONS:  - Assess patient frequently for physical needs  -  Identify cognitive and physical deficits and behaviors that affect risk of falls    -  Jackson fall precautions as indicated by assessment   - Educate patient/family on patient safety including physical limitations  - Instruct patient to call for assistance with activity based on assessment  - Modify environment to reduce risk of injury  - Consider OT/PT consult to assist with strengthening/mobility  Outcome: Progressing  Goal: Maintain or return to baseline ADL function  Description  INTERVENTIONS:  -  Assess patient's ability to carry out ADLs; assess patient's baseline for ADL function and identify physical deficits which impact ability to perform ADLs (bathing, care of mouth/teeth, toileting, grooming, dressing, etc )  - Assess/evaluate cause of self-care deficits   - Assess range of motion  - Assess patient's mobility; develop plan if impaired  - Assess patient's need for assistive devices and provide as appropriate  - Encourage maximum independence but intervene and supervise when necessary  - Involve family in performance of ADLs  - Assess for home care needs following discharge   - Consider OT consult to assist with ADL evaluation and planning for discharge  - Provide patient education as appropriate  Outcome: Progressing  Goal: Maintain or return mobility status to optimal level  Description  INTERVENTIONS:  - Assess patient's baseline mobility status (ambulation, transfers, stairs, etc )    - Identify cognitive and physical deficits and behaviors that affect mobility  - Identify mobility aids required to assist with transfers and/or ambulation (gait belt, sit-to-stand, lift, walker, cane, etc )  - Jackson fall precautions as indicated by assessment  - Record patient progress and toleration of activity level on Mobility SBAR; progress patient to next Phase/Stage  - Instruct patient to call for assistance with activity based on assessment  - Consider rehabilitation consult to assist with strengthening/weightbearing, etc   Outcome: Progressing     Problem: DISCHARGE PLANNING  Goal: Discharge to home or other facility with appropriate resources  Description  INTERVENTIONS:  - Identify barriers to discharge w/patient and caregiver  - Arrange for needed discharge resources and transportation as appropriate  - Identify discharge learning needs (meds, wound care, etc )  - Arrange for interpretive services to assist at discharge as needed  - Refer to Case Management Department for coordinating discharge planning if the patient needs post-hospital services based on physician/advanced practitioner order or complex needs related to functional status, cognitive ability, or social support system  Outcome: Progressing     Problem: Knowledge Deficit  Goal: Patient/family/caregiver demonstrates understanding of disease process, treatment plan, medications, and discharge instructions  Description  Complete learning assessment and assess knowledge base    Interventions:  - Provide teaching at level of understanding  - Provide teaching via preferred learning methods  Outcome: Progressing     Problem: HEMATOLOGIC - ADULT  Goal: Maintains hematologic stability  Description  INTERVENTIONS  - Assess for signs and symptoms of bleeding or hemorrhage  - Monitor labs  - Administer supportive blood products/factors as ordered and appropriate  Outcome: Progressing

## 2020-08-01 NOTE — ASSESSMENT & PLAN NOTE
· Patient reporting constant shortness of breath x1 month and chest muscle spasm for many years  · Reports he was told by his PCP to be evaluated at the ED due to concern of decreasing hemoglobin and hematocrit  · Hemoglobin currently 8 6 which is down from 11 3 on 07/28/2020, denies melena or hematochezia  · Hemoccult in ED negative  · D-dimer elevated at 1 54, most likely related to decreasing hemoglobin  · Chest x-ray negative, EKG revealing sinus tachycardia, nonspecific ST wave abnormality  · Negative troponin  · Monitor hemoglobin and hematocrit closely Q 6 hours  · Ordered type and screen  · Recheck CBC and CMP in a m    · GI consulted to rule out GI bleeding  · Will keep NPO at midnight pending GI consult

## 2020-08-01 NOTE — ASSESSMENT & PLAN NOTE
Continue home dose levothyroxine    No need to change any does specially in acute setting and patient follow-up on outpatient basis with repeat TSH and any further adjustments in his replacement

## 2020-08-01 NOTE — ASSESSMENT & PLAN NOTE
Chronic lower extremities edema  He also had a fall with some bruising on his legs especially the right leg  Venous duplex during this admission was negative for DVT

## 2020-08-01 NOTE — H&P
H&P- Giovanni Bo 1960, 63 y o  male MRN: [id number]    Unit/Bed#: -01 Encounter: [id number]    Primary Care Provider: Geraldo Frank MD   Date and time admitted to hospital: 7/31/2020  6:45 PM        * GI bleed  Assessment & Plan  · Patient reporting constant shortness of breath x1 month and chest muscle spasm for many years  · Reports he was told by his PCP to be evaluated at the ED due to concern of decreasing hemoglobin and hematocrit  · Hemoglobin currently 8 6 which is down from 11 3 on 07/28/2020, denies melena or hematochezia  · Hemoccult in ED negative  · D-dimer elevated at 1 54, most likely related to decreasing hemoglobin  · Chest x-ray negative, EKG revealing sinus tachycardia, nonspecific ST wave abnormality  · Negative troponin  · Monitor hemoglobin and hematocrit closely Q 6 hours  · Ordered type and screen  · Recheck CBC and CMP in a m    · GI consulted to rule out GI bleeding  · Will keep NPO at midnight pending GI consult    Lower extremity edema  Assessment & Plan  · Patient admits to a fall out of the shower on 07/08, admitting since then to ecchymosis of right lower extremity near knee, swelling, ttp, no issues walking  · D-dimer is elevated at 1 54, but most likely related to decreasing hemoglobin  · Rule out DVT with venous duplex of right leg due to +2 pitting edema, erythema, ttp    Muscle spasm  Assessment & Plan  · Patient reporting chronic lower midline chest muscle spasm pain near xiphoid process without radiation, patient also reports history of GERD  · Reports he has had this my whole life and denies any new changes in the spasms or pain, intermittent, no recent worsening, so no suspicion at this time of any cardiac interference  · EKG revealing sinus tachycardia, troponin negative, chest x-ray negative  · Continue home med of Flexeril    Hyponatremia  Assessment & Plan  · Slightly decreased at 131  · Gentle IV fluid hydration at 50 mL/hr for 8 hours  · Recheck CMP in a m     Hypothyroidism due to Hashimoto's thyroiditis  Assessment & Plan  · TSH elevated, order T4 for a m  · Resume home med of levothyroxine    Anxiety disorder  Assessment & Plan  · Mood currently stable  · Continue home meds of venlafaxine and BuSpar      VTE Prophylaxis: Pharmacologic VTE Prophylaxis contraindicated due to Rule out GI bleed  / sequential compression device   Code Status:  Level 3, DNR/DNI  POLST: POLST form is not discussed and not completed at this time  Anticipated Length of Stay:  Patient will be admitted on an Inpatient basis with an anticipated length of stay of  greater than 2 midnights  Justification for Hospital Stay:  Acute drop in hemoglobin, rule out GI bleed, pending GI consult    Total Time for Visit, including Counseling / Coordination of Care: 1 hour  Greater than 50% of this total time spent on direct patient counseling and coordination of care  Chief Complaint:      Chief Complaint   Patient presents with    Shortness of Breath     Patient presents to ED with SOB x 1 month and weakness  Patient reports abnormal labs "my hgb, hct and RBC are low " PCP instructed to come to ED  History of Present Illness:    Jasmin Gamble is a 63 y o  male with PMH not limited to overactive bladder , Hashimoto's thyroiditis, anxiety , autism spectrum disorder who presents with intermittent shortness of breath x1 month  Patient reports he came on suddenly and he is not sure why  Denies any cardiac history or any respiratory history  He reports he went to his PCP for workup and while he was there his blood work revealed he had a decreasing hemoglobin and hematocrit so his PCP discussed with him about being evaluated at the ED, so he came to the ED  He also does admit to intermittent muscle spasms of his chest wall near the xiphoid process, but denies any changes in in this which he has had for my whole life he reports    He denies any pain radiating down arms, numbness or tingling  He reports he takes Flexeril to help with spasms and denies any increasing intensity  He also admits to a fall out of the shower in which he did not hit his head on 07/08  He reports that he did see his PCP about this and reports he he had x-rays revealing no fractures, but he reports since then he has had bruising on his right knee, swelling in his right lower extremity, and redness  He denies pain while walking  He denies headache, dizziness, dyspnea, palpitations, abdominal pain, hematochezia, melena, hematuria, diarrhea  Review of Systems:    Review of Systems   Constitutional: Negative for activity change, appetite change and chills  Respiratory: Positive for shortness of breath  Cardiovascular: Positive for leg swelling  Negative for chest pain and palpitations  Gastrointestinal: Negative for abdominal pain, anal bleeding, blood in stool, constipation, diarrhea, nausea and vomiting  Genitourinary: Negative for hematuria  Musculoskeletal:        Tenderness to palpation of right leg, ecchymosis and edema of right leg  Admits to chest wall muscle spasms   Neurological: Negative for dizziness and headaches  Past Medical and Surgical History:     Past Medical History:   Diagnosis Date    Anxiety     Arthritis     Colon polyp     Disease of thyroid gland     Muscle weakness     Shortness of breath        Past Surgical History:   Procedure Laterality Date    COLONOSCOPY      HERNIA REPAIR  2003    SC CYSTOURETHRO W/IMPLANT N/A 11/7/2019    Procedure: CYSTOSCOPY WITH INSERTION UROLIFT;  Surgeon: Verner Squires, MD;  Location: [facility];  Service: Urology       Meds/Allergies:    Prior to Admission medications    Medication Sig Start Date End Date Taking?  Authorizing Provider   busPIRone (BUSPAR) 10 mg tablet Take by mouth 3 (three) times a day    Yes Historical Provider, MD   cyclobenzaprine (FLEXERIL) 10 mg tablet 10 mg daily at bedtime  6/24/19  Yes Historical Provider, MD   Levothyroxine Sodium (TIROSINT) 75 MCG CAPS Take 75 mcg by mouth daily 19  Yes Historical Provider, MD   omeprazole (PriLOSEC) 20 mg delayed release capsule Take 20 mg by mouth 2 (two) times a day  19  Yes Historical Provider, MD   venlafaxine (EFFEXOR-XR) 150 mg 24 hr capsule Take 150 mg by mouth daily 19  Yes Historical Provider, MD   docusate sodium (COLACE) 100 mg capsule Take 1 capsule (100 mg total) by mouth 2 (two) times a day for 15 days  Patient not taking: Reported on 19  Damian Hughes MD   ibuprofen (MOTRIN) 600 mg tablet     Historical Provider, MD   naproxen (EC NAPROSYN) 500 MG EC tablet Take 1 tablet (500 mg total) by mouth 2 (two) times a day with meals for 5 days  Patient not taking: Reported on 19  Damian Hughes MD     I have reviewed home medications with patient personally  Allergies: Allergies   Allergen Reactions    Aspirin      Stomach pain      Halobetasol Hallucinations    Olanzapine Hallucinations       Social History:     Marital Status: Single     Patient Pre-hospital Level of Mobility:  Independent  Patient Pre-hospital Diet Restrictions: no  Substance Use History:   Social History     Substance and Sexual Activity   Alcohol Use Never    Frequency: Never     Social History     Tobacco Use   Smoking Status Former Smoker    Last attempt to quit: 2008    Years since quittin 5   Smokeless Tobacco Never Used     Social History     Substance and Sexual Activity   Drug Use Never       Family History:    History reviewed  No pertinent family history      Physical Exam:     Vitals:   Blood Pressure: 156/81 (20)  Pulse: 94 (20)  Temperature: 98 3 °F (36 8 °C) (20)  Temp Source: Oral (20)  Respirations: 19 (20)  Height: 5' 10" (177 8 cm) (20)  Weight - Scale: 112 kg (246 lb 14 6 oz) (20)  SpO2: 100 % (20 2000)    Physical Exam   Constitutional: He is oriented to person, place, and time  He appears well-developed and well-nourished  No distress  Obese   HENT:   Head: Normocephalic  Nose: Nose normal    Eyes: Right eye exhibits no discharge  Left eye exhibits no discharge  No scleral icterus  Cardiovascular: Normal rate, regular rhythm and normal heart sounds  No murmur heard  Pulmonary/Chest: Effort normal and breath sounds normal  No stridor  No respiratory distress  He has no wheezes  He has no rales  Abdominal: Soft  Bowel sounds are normal  He exhibits no distension  There is no tenderness  There is no guarding  Musculoskeletal:   Erythema, +2 pitting edema, tenderness to palpation of right lower extremity below-the-knee  Ecchymosis noted of right knee   Neurological: He is alert and oriented to person, place, and time  Skin: Skin is warm and dry  Psychiatric: He has a normal mood and affect  His behavior is normal  Judgment and thought content normal    Nursing note and vitals reviewed  Additional Data:     Lab Results: I have personally reviewed pertinent reports  Results from last 7 days   Lab Units 07/31/20 2026   WBC Thousand/uL 5 93   HEMOGLOBIN g/dL 8 6*   HEMATOCRIT % 26 0*   PLATELETS Thousands/uL 196   NEUTROS PCT % 73   LYMPHS PCT % 16   MONOS PCT % 8   EOS PCT % 2     Results from last 7 days   Lab Units 07/31/20  1944   SODIUM mmol/L 131*   POTASSIUM mmol/L 4 1   CHLORIDE mmol/L 99*   CO2 mmol/L 24   BUN mg/dL 17   CREATININE mg/dL 1 05   ANION GAP mmol/L 8   CALCIUM mg/dL 7 8*   ALBUMIN g/dL 3 0*   TOTAL BILIRUBIN mg/dL 1 20*   ALK PHOS U/L 78   ALT U/L 19   AST U/L 35   GLUCOSE RANDOM mg/dL 115     Results from last 7 days   Lab Units 07/31/20  1949   INR  1 17                   Imaging: I have personally reviewed pertinent reports  XR chest 2 views   Final Result by Juju Jacobson MD (07/31 2011)      No acute cardiopulmonary disease              Workstation performed: [id number]         VAS lower limb venous duplex study, unilateral/limited    (Results Pending)       EKG, Pathology, and Other Studies Reviewed on Admission:   · EKG:  Sinus tachycardia, nonspecific ST wave abnormality    Allscripts / Epic Records Reviewed: Yes     ** Please Note: This note has been constructed using a voice recognition system   **

## 2020-08-01 NOTE — UTILIZATION REVIEW
Notification of Inpatient Admission/Inpatient Authorization Request   This is a Notification of Inpatient Admission for [address]  Be advised that this patient was admitted to our facility under Inpatient Status  Contact Mark Kennedy at [phone] for additional admission information  [facility] DEPT DEDICATED Lan Mosley [phone]  Patient Name:   Sacha Hollins   YOB: 1960       [address]   [address]:   [address]   Tax ID: [id number]  NPI: [id number] Attending Provider/NPI:  Phone:  Address: Ivonne Tracy [[id number]]  [phone]  Same as GISELA/Ramon Xiao [address]6 of Service Code: 24     [facility]   Start Date: 7/31/20 2049 Discharge Date & Time: No discharge date for patient encounter  Type of Admission: Inpatient Status Discharge Disposition   (if discharged): Home/Self Care   Patient Diagnoses: Shortness of breath [R06 02]  Chest pain [R07 9]  Dyspnea [R06 00]  Anemia [D64 9]  GI bleed [K92 2]     Orders: Admission Orders (From admission, onward)     Ordered        07/31/20 2049  Inpatient Admission (expected length of stay for this patient Order details is greater than two midnights)  Once                    Assigned Utilization Review Contact: Mark Kennedy  Utilization   [facility] Utilization Review Department  Phone: [phone]; Fax [phone]  Email: [email or web address]   ATTENTION PAYERS: Please call the assigned Utilization  directly with any questions or concerns ALL voicemails in the department are confidential  Send all requests for admission clinical reviews, approved or denied determinations and any other requests to dedicated fax number belonging to the campus where the patient is receiving treatment

## 2020-08-01 NOTE — PROGRESS NOTES
[facility] Medicine Progress Note  Patient: Arsalan Marquez 63 y o  male   MRN: [id number]  PCP: Ivana Carrillo MD  Unit/Bed#: -Francis Encounter: [id number]  Date Of Visit: 08/01/20          * Symptomatic anemia  Assessment & Plan  · Patient has been not feeling well for some time  He was found to be anemic  Hemoglobin has been trending downwards  Now 8 3 from around 11  Continue with current treatment  Continue to monitor hemoglobin  GI evaluation pending  He uses NSAIDs (Meloxicam) at home and is not aware of this causing any bleeding issues  Lower extremity edema  Assessment & Plan  Chronic lower extremities edema  Ultrasound negative for DVT  He is not on any medications for his lower extremities edema  Also he is becoming more short of breath  BNP not significantly elevated  Would check an echocardiogram ?  Pulmonary hypertension  Hyponatremia  Assessment & Plan  Hemodynamic  Continue to monitor  Clinically, he does not appear to be dehydrated    Other specified hypothyroidism  Assessment & Plan  Continue home dose levothyroxine  No need to change any does specially in acute setting and patient follow-up on outpatient basis with repeat TSH and any further adjustments in his replacement    Anxiety disorder  Assessment & Plan  He is anxious  Continue with home medications      Present on Admission:   Symptomatic anemia   Other specified hypothyroidism   Anxiety disorder   Hyponatremia   Muscle spasm   Lower extremity edema            VTE Pharmacologic Prophylaxis:   Pharmacologic: Pharmacologic VTE Prophylaxis contraindicated due to Decreasing hemoglobin  Mechanical VTE Prophylaxis in Place: Yes    Patient Centered Rounds: I have performed bedside rounds with nursing staff today  Discussions with Specialists or Other Care Team Provider:  Yes    Education and Discussions with Family / Patient:  Yes    Time Spent for Care: 35+ minutes    More than 50% of total time spent on counseling and coordination of care as described above  Current Length of Stay: 1 day(s)    Current Patient Status: Inpatient   Certification Statement: The patient will continue to require additional inpatient hospital stay due to Symptomatic anemia    Discharge Plan:  Home when stable    Code Status: Level 3 - DNAR and DNI      Subjective:   As per patient "I feel like crap"  On asking him, he states that he is not feeling well  He has been short of breath for couple of months at least   He denies any chest pain  Denies any nausea vomiting  No abdominal pain  Denies any hematemesis, melena, hematochezia    Objective:     Vitals:   Temp (24hrs), Av 5 °F (36 9 °C), Min:98 1 °F (36 7 °C), Max:99 2 °F (37 3 °C)    Temp:  [98 1 °F (36 7 °C)-99 2 °F (37 3 °C)] 99 °F (37 2 °C)  HR:  [] 88  Resp:  [16-23] 19  BP: (125-156)/(65-81) 125/73  SpO2:  [97 %-100 %] 100 %  Body mass index is 35 43 kg/m²  Input and Output Summary (last 24 hours): Intake/Output Summary (Last 24 hours) at 2020 1429  Last data filed at 2020 2210  Gross per 24 hour   Intake 960 ml   Output --   Net 960 ml           Physical Exam:     Vital signs are reviewed as above  Constitutional:  Lying in bed and restless  Eyes: EOM grossly intact  Conjunctivae are reviewed pale  Patient has anicteric sclera  HENT: Oropharynx are crowded and moist  Did not notice any significant lesions on the tongue  Head normocephalic  Neck: Neck is supple  I was not able to visualize any JVD at 90°  There is no significant lymphadenopathy  I also did not notice any significant thyromegaly  Cardiac: I did not hear any rubs or gallop  Patient appears to be in sinus rhythm/tachycardic  Respiratory: Patient not in significant respiratory distress  Air entry in general is fair  GI: Abdomen is obese and soft  It is grossly nontender  Bowel sounds are audible  I was not able to appreciate any hepatosplenomegaly  Neurologic:   Patient is awake and alert  Neurological examination is grossly intact  No obvious focal neurological deficit noticed  Skin: Skin is warm and dry  Skin is also pale  Psychiatric:  Anxious  Musculoskeletal  Patient moving all extremities while in bed  As per patient, patient has chronic lower extremities edema  Additional Data:     Labs:    Results from last 7 days   Lab Units 08/01/20  0902 08/01/20  0552  07/31/20 2026   WBC Thousand/uL  --  5 34  --  5 93   HEMOGLOBIN g/dL 8 3* 8 2*   < > 8 6*   HEMATOCRIT % 25 5* 25 5*   < > 26 0*   PLATELETS Thousands/uL  --  192  --  196   NEUTROS PCT %  --   --   --  73   LYMPHS PCT %  --   --   --  16   MONOS PCT %  --   --   --  8   EOS PCT %  --   --   --  2    < > = values in this interval not displayed  Results from last 7 days   Lab Units 08/01/20  0552   POTASSIUM mmol/L 3 7   CHLORIDE mmol/L 101   CO2 mmol/L 24   BUN mg/dL 15   CREATININE mg/dL 1 02   CALCIUM mg/dL 7 9*   ALK PHOS U/L 73   ALT U/L 16   AST U/L 19     Results from last 7 days   Lab Units 07/31/20  1949   INR  1 17       * I Have Reviewed All Lab Data Listed Above  * Additional Pertinent Lab Tests Reviewed:  All Labs Within Last 24 Hours Reviewed    Imaging:    Lower extremities ultrasound negative for DVT        Last 24 Hours Medication List:     Current Facility-Administered Medications:  acetaminophen 650 mg Oral Q6H PRN Janeane Sour, PA-C   aluminum-magnesium hydroxide-simethicone 30 mL Oral Q6H PRN Janeane Sour, PA-C   busPIRone 10 mg Oral TID Janeane Brett, PA-C   cyclobenzaprine 10 mg Oral HS Samantha Phillips PA-C   docusate sodium 100 mg Oral BID PRN Janeane Sour, PA-GISELA   levothyroxine 75 mcg Oral Early Morning Samantha Phillips PA-C   ondansetron 4 mg Intravenous Q6H PRN Janeane Sour, PA-GISELA   pantoprazole 40 mg Oral BID AC Janeane Sour, PA-C   venlafaxine 150 mg Oral Daily Janeane NO WestC        Today, Patient Was Seen By: Adan Marie MD    ** Please Note: Dragon 360 Dictation voice to text software may have been used in the creation of this document   **

## 2020-08-02 ENCOUNTER — APPOINTMENT (INPATIENT)
Dept: NON INVASIVE DIAGNOSTICS | Facility: HOSPITAL | Age: 60
DRG: 812 | End: 2020-08-02
Payer: COMMERCIAL

## 2020-08-02 LAB
ALBUMIN SERPL BCP-MCNC: 2.8 G/DL (ref 3.5–5)
ALP SERPL-CCNC: 72 U/L (ref 46–116)
ALT SERPL W P-5'-P-CCNC: 18 U/L (ref 12–78)
ANION GAP SERPL CALCULATED.3IONS-SCNC: 7 MMOL/L (ref 4–13)
AST SERPL W P-5'-P-CCNC: 20 U/L (ref 5–45)
ATRIAL RATE: 101 BPM
BILIRUB SERPL-MCNC: 1.3 MG/DL (ref 0.2–1)
BUN SERPL-MCNC: 17 MG/DL (ref 5–25)
CALCIUM SERPL-MCNC: 7.7 MG/DL (ref 8.3–10.1)
CHLORIDE SERPL-SCNC: 101 MMOL/L (ref 100–108)
CO2 SERPL-SCNC: 26 MMOL/L (ref 21–32)
CREAT SERPL-MCNC: 0.92 MG/DL (ref 0.6–1.3)
ERYTHROCYTE [DISTWIDTH] IN BLOOD BY AUTOMATED COUNT: 13 % (ref 11.6–15.1)
FERRITIN SERPL-MCNC: 122 NG/ML (ref 8–388)
GFR SERPL CREATININE-BSD FRML MDRD: 89 ML/MIN/1.73SQ M
GLUCOSE SERPL-MCNC: 107 MG/DL (ref 65–140)
HCT VFR BLD AUTO: 24.6 % (ref 36.5–49.3)
HGB BLD-MCNC: 7.8 G/DL (ref 12–17)
IRON SATN MFR SERPL: 13 %
IRON SERPL-MCNC: 41 UG/DL (ref 65–175)
MCH RBC QN AUTO: 29.3 PG (ref 26.8–34.3)
MCHC RBC AUTO-ENTMCNC: 31.7 G/DL (ref 31.4–37.4)
MCV RBC AUTO: 93 FL (ref 82–98)
P AXIS: 70 DEGREES
PLATELET # BLD AUTO: 181 THOUSANDS/UL (ref 149–390)
PMV BLD AUTO: 9 FL (ref 8.9–12.7)
POTASSIUM SERPL-SCNC: 3.6 MMOL/L (ref 3.5–5.3)
PR INTERVAL: 144 MS
PROT SERPL-MCNC: 5.8 G/DL (ref 6.4–8.2)
QRS AXIS: 2 DEGREES
QRSD INTERVAL: 82 MS
QT INTERVAL: 338 MS
QTC INTERVAL: 438 MS
RBC # BLD AUTO: 2.66 MILLION/UL (ref 3.88–5.62)
SODIUM SERPL-SCNC: 134 MMOL/L (ref 136–145)
T WAVE AXIS: 43 DEGREES
TIBC SERPL-MCNC: 309 UG/DL (ref 250–450)
VENTRICULAR RATE: 101 BPM
WBC # BLD AUTO: 4.92 THOUSAND/UL (ref 4.31–10.16)

## 2020-08-02 PROCEDURE — 99233 SBSQ HOSP IP/OBS HIGH 50: CPT | Performed by: INTERNAL MEDICINE

## 2020-08-02 PROCEDURE — 85027 COMPLETE CBC AUTOMATED: CPT | Performed by: INTERNAL MEDICINE

## 2020-08-02 PROCEDURE — 87635 SARS-COV-2 COVID-19 AMP PRB: CPT | Performed by: INTERNAL MEDICINE

## 2020-08-02 PROCEDURE — 93306 TTE W/DOPPLER COMPLETE: CPT | Performed by: INTERNAL MEDICINE

## 2020-08-02 PROCEDURE — 93306 TTE W/DOPPLER COMPLETE: CPT

## 2020-08-02 PROCEDURE — 99232 SBSQ HOSP IP/OBS MODERATE 35: CPT | Performed by: INTERNAL MEDICINE

## 2020-08-02 PROCEDURE — NC001 PR NO CHARGE: Performed by: INTERNAL MEDICINE

## 2020-08-02 PROCEDURE — 80053 COMPREHEN METABOLIC PANEL: CPT | Performed by: INTERNAL MEDICINE

## 2020-08-02 PROCEDURE — 93010 ELECTROCARDIOGRAM REPORT: CPT | Performed by: INTERNAL MEDICINE

## 2020-08-02 RX ADMIN — PANTOPRAZOLE SODIUM 40 MG: 40 TABLET, DELAYED RELEASE ORAL at 17:00

## 2020-08-02 RX ADMIN — BUSPIRONE HYDROCHLORIDE 10 MG: 5 TABLET ORAL at 08:54

## 2020-08-02 RX ADMIN — BUSPIRONE HYDROCHLORIDE 10 MG: 5 TABLET ORAL at 17:00

## 2020-08-02 RX ADMIN — IRON SUCROSE 300 MG: 20 INJECTION, SOLUTION INTRAVENOUS at 10:00

## 2020-08-02 RX ADMIN — LEVOTHYROXINE SODIUM 75 MCG: 75 TABLET ORAL at 05:47

## 2020-08-02 RX ADMIN — CYCLOBENZAPRINE HYDROCHLORIDE 10 MG: 10 TABLET, FILM COATED ORAL at 21:11

## 2020-08-02 RX ADMIN — VENLAFAXINE HYDROCHLORIDE 150 MG: 150 CAPSULE, EXTENDED RELEASE ORAL at 08:55

## 2020-08-02 RX ADMIN — PANTOPRAZOLE SODIUM 40 MG: 40 TABLET, DELAYED RELEASE ORAL at 05:47

## 2020-08-02 RX ADMIN — BUSPIRONE HYDROCHLORIDE 10 MG: 5 TABLET ORAL at 21:11

## 2020-08-02 NOTE — PROGRESS NOTES
[facility] Progress Note  Patient: Jamir Lowe 63 y o  male   MRN: [id number]  PCP: Luc Chirinos MD  Unit/Bed#: -Francis Encounter: [id number]  Date Of Visit: 08/02/20          * Symptomatic anemia  Assessment & Plan  Iron is quite low  Ferritin is normal   Appears to be chronic issue  Would give him couple of doses of IV Venofer  Patient does not want any colonoscopy  Scheduled for EGD tomorrow  No NSAIDs  Lower extremity edema  Assessment & Plan  Chronic lower extremities edema  Stable    Hyponatremia  Assessment & Plan  Hemodynamic  Continue to monitor  Clinically, he does not appear to be dehydrated    Other specified hypothyroidism  Assessment & Plan  Continue home dose levothyroxine  No need to change any does specially in acute setting and patient follow-up on outpatient basis with repeat TSH and any further adjustments in his replacement    Anxiety disorder  Assessment & Plan  He is anxious  Continue with home medications  He is quite strange        Present on Admission:   Symptomatic anemia   Other specified hypothyroidism   Anxiety disorder   Hyponatremia   Muscle spasm   Lower extremity edema            VTE Pharmacologic Prophylaxis:   Pharmacologic: Pharmacologic VTE Prophylaxis contraindicated due to Severe anemia  Mechanical VTE Prophylaxis in Place: Yes    Patient Centered Rounds: I have performed bedside rounds with nursing staff today  Discussions with Specialists or Other Care Team Provider:  Yes    Education and Discussions with Family / Patient:  Yes    Time Spent for Care: 35+ minutes  More than 50% of total time spent on counseling and coordination of care as described above      Current Length of Stay: 2 day(s)    Current Patient Status: Inpatient   Certification Statement: The patient will continue to require additional inpatient hospital stay due to Anemia    Discharge Plan:  Home when stable    Code Status: Level 3 - DNAR and DNI      Subjective: I feel "crazy like hell"   No abdominal pain  Denies any fever or chills  Denies being dizzy or lightheaded    Objective:     Vitals:   Temp (24hrs), Av 6 °F (37 °C), Min:98 2 °F (36 8 °C), Max:99 °F (37 2 °C)    Temp:  [98 2 °F (36 8 °C)-99 °F (37 2 °C)] 98 2 °F (36 8 °C)  HR:  [86-90] 86  Resp:  [16-19] 18  BP: (120-125)/(71-73) 120/71  SpO2:  [88 %-100 %] 98 %  Body mass index is 35 43 kg/m²  Input and Output Summary (last 24 hours): Intake/Output Summary (Last 24 hours) at 2020 1045  Last data filed at 2020 0900  Gross per 24 hour   Intake 540 ml   Output 200 ml   Net 340 ml           Physical Exam:     Vital signs are reviewed as above  Obese male who is lying in bed  He basically keeps playing with his telephone and would not give up and keeps cursing his phone and he wants to look up something about meloxicam side effects etc  Conjunctivae and skin are pale  Stable lower extremities edema  S1 and S2 audible  Abdomen is obese  Nontender  Bowel sounds are audible  Chest clear to auscultation  Neck is supple  He definitely has underlying mental issues    Additional Data:     Labs:    Results from last 7 days   Lab Units 20  0443  20  2026   WBC Thousand/uL 4 92   < > 5 93   HEMOGLOBIN g/dL 7 8*   < > 8 6*   HEMATOCRIT % 24 6*   < > 26 0*   PLATELETS Thousands/uL 181   < > 196   NEUTROS PCT %  --   --  73   LYMPHS PCT %  --   --  16   MONOS PCT %  --   --  8   EOS PCT %  --   --  2    < > = values in this interval not displayed  Results from last 7 days   Lab Units 20  0443   POTASSIUM mmol/L 3 6   CHLORIDE mmol/L 101   CO2 mmol/L 26   BUN mg/dL 17   CREATININE mg/dL 0 92   CALCIUM mg/dL 7 7*   ALK PHOS U/L 72   ALT U/L 18   AST U/L 20     Results from last 7 days   Lab Units 20  1949   INR  1 17       * I Have Reviewed All Lab Data Listed Above  * Additional Pertinent Lab Tests Reviewed:  All Labs Within Last 24 Hours Reviewed      Recent Cultures (last 7 days):           Last 24 Hours Medication List:   acetaminophen, 650 mg, Oral, Q6H PRN, Felice West PA-C  aluminum-magnesium hydroxide-simethicone, 30 mL, Oral, Q6H PRN, Felice West PA-C  busPIRone, 10 mg, Oral, TID, Samantha Phillips PA-C  cyclobenzaprine, 10 mg, Oral, HS, Samantha Phillips PA-C  docusate sodium, 100 mg, Oral, BID PRN, Felice West PA-C  iron sucrose, 300 mg, Intravenous, Daily, Adan Marie MD, Last Rate: 300 mg (08/02/20 1000)  levothyroxine, 75 mcg, Oral, Early Morning, Samantha Phillips PA-C  ondansetron, 4 mg, Intravenous, Q6H PRN, Felice West PA-C  pantoprazole, 40 mg, Oral, BID AC, Felice West PA-C  venlafaxine, 150 mg, Oral, Daily, Felice West PA-C         Today, Patient Was Seen By: Adan Marie MD    ** Please Note: Dragon 360 Dictation voice to text software may have been used in the creation of this document   **

## 2020-08-02 NOTE — NURSING NOTE
Patient Gianni Whitehead fell in the shower on July 8th  Since then his fatigue has increased and gets light headed when trying to get up

## 2020-08-02 NOTE — NURSING NOTE
Patient refused to allow me to assess his bruises from his fall  His right leg is still swollen and warm to the touch  Also noticed a large bruise along the top of his buttock and a lump between his shoulder blades approximately 2 inches in diameter  In addition he is very anxious and switches topics making it difficult to carry a conversation with him

## 2020-08-02 NOTE — PROGRESS NOTES
Progress Note- Millie Mccullough 63 y o  male MRN: [id number]    Unit/Bed#: -01 Encounter: [id number]      Assessment and Plan:    Anemia:  He has anemia with a low iron saturation but normal ferritin indicated he likely has anemia of chronic disease  He has not had any gross evidence of bleeding so I suspect he has had slow chronic bleeding from his gastrointestinal tract contributing to his anemia but that it is likely multifactorial   He declined colonoscopy so I will schedule him for an upper endoscopy tomorrow  If the upper endoscopy is negative, we will plan for colonoscopy on Tuesday  Given his history of NSAID use, peptic ulcer disease or erosive gastritis are high in the differential     ______________________________________________________________________    Subjective:     He said he has not had any melena or hematochezia or abdominal pain overnight  Although he had agreed upper endoscopy and colonoscopy initially, he is now declining colonoscopy  Moreno Zazueta he is willing to proceed with upper endoscopy tomorrow  If the source of his iron deficiency anemia is not found during the upper endoscopy, then he is open to prepping tomorrow for colonoscopy on Tuesday      Medication Administration - last 24 hours from 08/01/2020 1730 to 08/02/2020 1730       Date/Time Order Dose Route Action Action by     08/02/2020 0547 levothyroxine tablet 75 mcg 75 mcg Oral Given James Webb RN     08/02/2020 1700 busPIRone (BUSPAR) tablet 10 mg 10 mg Oral Given Peace Kaba RN     08/02/2020 0854 busPIRone (BUSPAR) tablet 10 mg 10 mg Oral Given Peace Kaba RN     08/01/2020 2157 busPIRone (BUSPAR) tablet 10 mg 10 mg Oral Given James Webb RN     08/01/2020 2157 cyclobenzaprine (FLEXERIL) tablet 10 mg 10 mg Oral Given James Webb RN     08/02/2020 1700 pantoprazole (PROTONIX) EC tablet 40 mg 40 mg Oral Given Peace Kaba RN     08/02/2020 0547 pantoprazole (PROTONIX) EC tablet 40 mg 40 mg Oral Given James Webb RN     08/02/2020 0855 venlafaxine (EFFEXOR-XR) 24 hr capsule 150 mg 150 mg Oral Given Nathaniel Maynard RN     08/02/2020 1000 iron sucrose (VENOFER) 300 mg in sodium chloride 0 9 % 250 mL IVPB 300 mg Intravenous New Bag Nathaniel Maynard RN          Objective:     Vitals: Blood pressure 103/58, pulse 95, temperature 98 5 °F (36 9 °C), resp  rate 16, height 5' 10", weight 112 kg (246 lb 14 6 oz), SpO2 98 %  ,Body mass index is 35 43 kg/m²        Intake/Output Summary (Last 24 hours) at 8/2/2020 1730  Last data filed at 8/2/2020 1300  Gross per 24 hour   Intake 660 ml   Output 200 ml   Net 460 ml       Physical Exam:   General Appearance: Awake and alert, in no acute distress  Abdomen: Soft, obese, non-tender, non-distended; bowel sounds normal; no masses or no organomegaly    Invasive Devices     Peripheral Intravenous Line            Peripheral IV 08/02/20 Distal;Left;Upper;Ventral (anterior) Arm less than 1 day                Lab Results:  Admission on 07/31/2020   Component Date Value    WBC 07/31/2020 5 93     RBC 07/31/2020 2 85*    Hemoglobin 07/31/2020 8 6*    Hematocrit 07/31/2020 26 0*    MCV 07/31/2020 91     MCH 07/31/2020 30 2     MCHC 07/31/2020 33 1     RDW 07/31/2020 12 7     MPV 07/31/2020 8 8*    Platelets 45/16/3898 196     nRBC 07/31/2020 0     Neutrophils Relative 07/31/2020 73     Immat GRANS % 07/31/2020 1     Lymphocytes Relative 07/31/2020 16     Monocytes Relative 07/31/2020 8     Eosinophils Relative 07/31/2020 2     Basophils Relative 07/31/2020 0     Neutrophils Absolute 07/31/2020 4 37     Immature Grans Absolute 07/31/2020 0 04     Lymphocytes Absolute 07/31/2020 0 94     Monocytes Absolute 07/31/2020 0 46     Eosinophils Absolute 07/31/2020 0 10     Basophils Absolute 07/31/2020 0 02     Protime 07/31/2020 14 4     INR 07/31/2020 1 17     PTT 07/31/2020 28     Sodium 07/31/2020 131*    Potassium 07/31/2020 4 1     Chloride 07/31/2020 99*    CO2 07/31/2020 24     ANION GAP 07/31/2020 8     BUN 07/31/2020 17     Creatinine 07/31/2020 1 05     Glucose 07/31/2020 115     Calcium 07/31/2020 7 8*    AST 07/31/2020 35     ALT 07/31/2020 19     Alkaline Phosphatase 07/31/2020 78     Total Protein 07/31/2020 6 2*    Albumin 07/31/2020 3 0*    Total Bilirubin 07/31/2020 1 20*    eGFR 07/31/2020 76     Troponin I 07/31/2020 <0 02     NT-proBNP 07/31/2020 299*    TSH 3RD GENERATON 07/31/2020 4 913*    D-Dimer, Quant 07/31/2020 1 54*    ABO Grouping 07/31/2020 A     Rh Factor 07/31/2020 Positive     Antibody Screen 07/31/2020 Negative     Specimen Expiration Date 07/31/2020 81920089     ABO Grouping 07/31/2020 A     Rh Factor 07/31/2020 Positive     Hemoglobin 08/01/2020 9 0*    Hematocrit 08/01/2020 27 9*    Free T4 08/01/2020 1 26     Sodium 08/01/2020 132*    Potassium 08/01/2020 3 7     Chloride 08/01/2020 101     CO2 08/01/2020 24     ANION GAP 08/01/2020 7     BUN 08/01/2020 15     Creatinine 08/01/2020 1 02     Glucose 08/01/2020 114     Calcium 08/01/2020 7 9*    AST 08/01/2020 19     ALT 08/01/2020 16     Alkaline Phosphatase 08/01/2020 73     Total Protein 08/01/2020 5 8*    Albumin 08/01/2020 2 8*    Total Bilirubin 08/01/2020 1 40*    eGFR 08/01/2020 79     WBC 08/01/2020 5 34     RBC 08/01/2020 2 79*    Hemoglobin 08/01/2020 8 2*    Hematocrit 08/01/2020 25 5*    MCV 08/01/2020 91     MCH 08/01/2020 29 4     MCHC 08/01/2020 32 2     RDW 08/01/2020 12 9     Platelets 79/98/0501 192     MPV 08/01/2020 8 9     Hemoglobin 08/01/2020 8 3*    Hematocrit 08/01/2020 25 5*    Iron Saturation 08/01/2020 13     TIBC 08/01/2020 309     Iron 08/01/2020 41*    Ferritin 08/01/2020 122     WBC 08/02/2020 4 92     RBC 08/02/2020 2 66*    Hemoglobin 08/02/2020 7 8*    Hematocrit 08/02/2020 24 6*    MCV 08/02/2020 93     MCH 08/02/2020 29 3     MCHC 08/02/2020 31 7     RDW 08/02/2020 13 0     Platelets 02/12/2797 181     MPV 08/02/2020 9 0     Sodium 08/02/2020 134*    Potassium 08/02/2020 3 6     Chloride 08/02/2020 101     CO2 08/02/2020 26     ANION GAP 08/02/2020 7     BUN 08/02/2020 17     Creatinine 08/02/2020 0 92     Glucose 08/02/2020 107     Calcium 08/02/2020 7 7*    AST 08/02/2020 20     ALT 08/02/2020 18     Alkaline Phosphatase 08/02/2020 72     Total Protein 08/02/2020 5 8*    Albumin 08/02/2020 2 8*    Total Bilirubin 08/02/2020 1 30*    eGFR 08/02/2020 89        Imaging Studies: I have personally reviewed pertinent imaging studies

## 2020-08-03 ENCOUNTER — APPOINTMENT (OUTPATIENT)
Dept: GASTROENTEROLOGY | Facility: HOSPITAL | Age: 60
DRG: 812 | End: 2020-08-03
Attending: INTERNAL MEDICINE
Payer: COMMERCIAL

## 2020-08-03 ENCOUNTER — ANESTHESIA (INPATIENT)
Dept: GASTROENTEROLOGY | Facility: HOSPITAL | Age: 60
DRG: 812 | End: 2020-08-03
Payer: COMMERCIAL

## 2020-08-03 ENCOUNTER — APPOINTMENT (INPATIENT)
Dept: CT IMAGING | Facility: HOSPITAL | Age: 60
DRG: 812 | End: 2020-08-03
Payer: COMMERCIAL

## 2020-08-03 ENCOUNTER — ANESTHESIA EVENT (INPATIENT)
Dept: GASTROENTEROLOGY | Facility: HOSPITAL | Age: 60
DRG: 812 | End: 2020-08-03
Payer: COMMERCIAL

## 2020-08-03 PROBLEM — E03.9 HYPOTHYROIDISM: Status: ACTIVE | Noted: 2018-04-08

## 2020-08-03 PROBLEM — E03.8 OTHER SPECIFIED HYPOTHYROIDISM: Status: RESOLVED | Noted: 2018-04-08 | Resolved: 2020-08-03

## 2020-08-03 LAB
ANION GAP SERPL CALCULATED.3IONS-SCNC: 4 MMOL/L (ref 4–13)
BUN SERPL-MCNC: 17 MG/DL (ref 5–25)
CALCIUM SERPL-MCNC: 7.9 MG/DL (ref 8.3–10.1)
CHLORIDE SERPL-SCNC: 102 MMOL/L (ref 100–108)
CO2 SERPL-SCNC: 26 MMOL/L (ref 21–32)
CREAT SERPL-MCNC: 0.83 MG/DL (ref 0.6–1.3)
ERYTHROCYTE [DISTWIDTH] IN BLOOD BY AUTOMATED COUNT: 13.2 % (ref 11.6–15.1)
GFR SERPL CREATININE-BSD FRML MDRD: 95 ML/MIN/1.73SQ M
GLUCOSE SERPL-MCNC: 104 MG/DL (ref 65–140)
HCT VFR BLD AUTO: 24.6 % (ref 36.5–49.3)
HEMOCCULT STL QL: NEGATIVE
HGB BLD-MCNC: 7.8 G/DL (ref 12–17)
MCH RBC QN AUTO: 29.8 PG (ref 26.8–34.3)
MCHC RBC AUTO-ENTMCNC: 31.7 G/DL (ref 31.4–37.4)
MCV RBC AUTO: 94 FL (ref 82–98)
PLATELET # BLD AUTO: 194 THOUSANDS/UL (ref 149–390)
PMV BLD AUTO: 9.1 FL (ref 8.9–12.7)
POTASSIUM SERPL-SCNC: 3.9 MMOL/L (ref 3.5–5.3)
RBC # BLD AUTO: 2.62 MILLION/UL (ref 3.88–5.62)
SARS-COV-2 RNA RESP QL NAA+PROBE: NEGATIVE
SODIUM SERPL-SCNC: 132 MMOL/L (ref 136–145)
WBC # BLD AUTO: 5.06 THOUSAND/UL (ref 4.31–10.16)

## 2020-08-03 PROCEDURE — 43239 EGD BIOPSY SINGLE/MULTIPLE: CPT | Performed by: INTERNAL MEDICINE

## 2020-08-03 PROCEDURE — 0DB68ZX EXCISION OF STOMACH, VIA NATURAL OR ARTIFICIAL OPENING ENDOSCOPIC, DIAGNOSTIC: ICD-10-PCS | Performed by: INTERNAL MEDICINE

## 2020-08-03 PROCEDURE — 88305 TISSUE EXAM BY PATHOLOGIST: CPT | Performed by: PATHOLOGY

## 2020-08-03 PROCEDURE — 85027 COMPLETE CBC AUTOMATED: CPT | Performed by: INTERNAL MEDICINE

## 2020-08-03 PROCEDURE — 80048 BASIC METABOLIC PNL TOTAL CA: CPT | Performed by: INTERNAL MEDICINE

## 2020-08-03 PROCEDURE — 99233 SBSQ HOSP IP/OBS HIGH 50: CPT | Performed by: INTERNAL MEDICINE

## 2020-08-03 PROCEDURE — G1004 CDSM NDSC: HCPCS

## 2020-08-03 PROCEDURE — 82272 OCCULT BLD FECES 1-3 TESTS: CPT | Performed by: INTERNAL MEDICINE

## 2020-08-03 PROCEDURE — 0DB98ZX EXCISION OF DUODENUM, VIA NATURAL OR ARTIFICIAL OPENING ENDOSCOPIC, DIAGNOSTIC: ICD-10-PCS | Performed by: INTERNAL MEDICINE

## 2020-08-03 PROCEDURE — 74176 CT ABD & PELVIS W/O CONTRAST: CPT

## 2020-08-03 RX ORDER — FENTANYL CITRATE/PF 50 MCG/ML
50 SYRINGE (ML) INJECTION
Status: CANCELLED | OUTPATIENT
Start: 2020-08-03

## 2020-08-03 RX ORDER — ONDANSETRON 2 MG/ML
4 INJECTION INTRAMUSCULAR; INTRAVENOUS ONCE AS NEEDED
Status: CANCELLED | OUTPATIENT
Start: 2020-08-03

## 2020-08-03 RX ORDER — SODIUM CHLORIDE, SODIUM LACTATE, POTASSIUM CHLORIDE, CALCIUM CHLORIDE 600; 310; 30; 20 MG/100ML; MG/100ML; MG/100ML; MG/100ML
20 INJECTION, SOLUTION INTRAVENOUS CONTINUOUS
Status: DISCONTINUED | OUTPATIENT
Start: 2020-08-03 | End: 2020-08-04

## 2020-08-03 RX ORDER — PROPOFOL 10 MG/ML
INJECTION, EMULSION INTRAVENOUS AS NEEDED
Status: DISCONTINUED | OUTPATIENT
Start: 2020-08-03 | End: 2020-08-03

## 2020-08-03 RX ORDER — SODIUM CHLORIDE, SODIUM LACTATE, POTASSIUM CHLORIDE, CALCIUM CHLORIDE 600; 310; 30; 20 MG/100ML; MG/100ML; MG/100ML; MG/100ML
INJECTION, SOLUTION INTRAVENOUS CONTINUOUS PRN
Status: DISCONTINUED | OUTPATIENT
Start: 2020-08-03 | End: 2020-08-03

## 2020-08-03 RX ORDER — POLYETHYLENE GLYCOL 3350 17 G/17G
238 POWDER, FOR SOLUTION ORAL ONCE
Status: COMPLETED | OUTPATIENT
Start: 2020-08-03 | End: 2020-08-03

## 2020-08-03 RX ORDER — LIDOCAINE HYDROCHLORIDE 10 MG/ML
INJECTION, SOLUTION EPIDURAL; INFILTRATION; INTRACAUDAL; PERINEURAL AS NEEDED
Status: DISCONTINUED | OUTPATIENT
Start: 2020-08-03 | End: 2020-08-03

## 2020-08-03 RX ORDER — PANTOPRAZOLE SODIUM 40 MG/1
40 TABLET, DELAYED RELEASE ORAL
Status: DISCONTINUED | OUTPATIENT
Start: 2020-08-04 | End: 2020-08-07 | Stop reason: HOSPADM

## 2020-08-03 RX ADMIN — BUSPIRONE HYDROCHLORIDE 10 MG: 5 TABLET ORAL at 08:18

## 2020-08-03 RX ADMIN — PROPOFOL 100 MG: 10 INJECTION, EMULSION INTRAVENOUS at 11:49

## 2020-08-03 RX ADMIN — LEVOTHYROXINE SODIUM 75 MCG: 75 TABLET ORAL at 05:36

## 2020-08-03 RX ADMIN — CYCLOBENZAPRINE HYDROCHLORIDE 10 MG: 10 TABLET, FILM COATED ORAL at 22:26

## 2020-08-03 RX ADMIN — PROPOFOL 50 MG: 10 INJECTION, EMULSION INTRAVENOUS at 11:51

## 2020-08-03 RX ADMIN — LIDOCAINE HYDROCHLORIDE 50 MG: 10 INJECTION, SOLUTION EPIDURAL; INFILTRATION; INTRACAUDAL; PERINEURAL at 11:49

## 2020-08-03 RX ADMIN — BUSPIRONE HYDROCHLORIDE 10 MG: 5 TABLET ORAL at 22:26

## 2020-08-03 RX ADMIN — POLYETHYLENE GLYCOL 3350 238 G: 17 POWDER, FOR SOLUTION ORAL at 16:35

## 2020-08-03 RX ADMIN — SODIUM CHLORIDE, SODIUM LACTATE, POTASSIUM CHLORIDE, AND CALCIUM CHLORIDE: .6; .31; .03; .02 INJECTION, SOLUTION INTRAVENOUS at 11:41

## 2020-08-03 RX ADMIN — PROPOFOL 50 MG: 10 INJECTION, EMULSION INTRAVENOUS at 11:52

## 2020-08-03 RX ADMIN — BUSPIRONE HYDROCHLORIDE 10 MG: 5 TABLET ORAL at 16:35

## 2020-08-03 RX ADMIN — IRON SUCROSE 300 MG: 20 INJECTION, SOLUTION INTRAVENOUS at 08:18

## 2020-08-03 RX ADMIN — VENLAFAXINE HYDROCHLORIDE 150 MG: 150 CAPSULE, EXTENDED RELEASE ORAL at 08:18

## 2020-08-03 RX ADMIN — PANTOPRAZOLE SODIUM 40 MG: 40 TABLET, DELAYED RELEASE ORAL at 05:40

## 2020-08-03 NOTE — ANESTHESIA PREPROCEDURE EVALUATION
Procedure:  EGD    Relevant Problems   ENDO   (+) Hypothyroidism      HEMATOLOGY   (+) Symptomatic anemia      NEURO/PSYCH   (+) Anxiety disorder        Physical Exam    Airway      TM Distance: >3 FB       Dental       Cardiovascular  Rhythm: regular, Rate: normal,     Pulmonary  Pulmonary exam normal     Other Findings        Anesthesia Plan  ASA Score- 2     Anesthesia Type- IV sedation with anesthesia with ASA Monitors  Additional Monitors:   Airway Plan:           Plan Factors-Exercise tolerance (METS): >4 METS  Chart reviewed  EKG reviewed  Induction- intravenous  Postoperative Plan-     Informed Consent- Anesthetic plan and risks discussed with patient  I personally reviewed this patient with the CRNA  Discussed and agreed on the Anesthesia Plan with the CRNA  Catrachito Saxena

## 2020-08-03 NOTE — PROGRESS NOTES
[facility] Progress Note  Patient: Eufemia Pepper 63 y o  male   MRN: [id number]  PCP: Kushal Randall MD  Unit/Bed#: -[facility] Encounter: [id number]  Date Of Visit: 08/03/20          * Symptomatic anemia  Assessment & Plan  Iron deficiency anemia  Going for EGD today  Not convinced that he needs colonoscopy-specially concerned about have  Continue with IV iron  He would need iron supplement on discharge as    Lower extremity edema  Assessment & Plan  Chronic lower extremities edema  He also had a fall with some bruising on his legs especially the right leg  Muscle spasm  Assessment & Plan  Muscle spasms are chronic  No NSAIDs    Hyponatremia  Assessment & Plan  Hemodynamic  Continue to monitor  Clinically, he does not appear to be dehydrated    Hypothyroidism  Assessment & Plan  Continue home dose levothyroxine  No need to change any does specially in acute setting and patient follow-up on outpatient basis with repeat TSH and any further adjustments in his replacement    Anxiety disorder  Assessment & Plan  He is anxious  Continue with home medications  Present on Admission:   Symptomatic anemia   Hypothyroidism   Anxiety disorder   Hyponatremia   Muscle spasm   Lower extremity edema            VTE Pharmacologic Prophylaxis:   Pharmacologic: Pharmacologic VTE Prophylaxis contraindicated due to Anemia  Mechanical VTE Prophylaxis in Place: Yes    Patient Centered Rounds: I have performed bedside rounds with nursing staff today  Discussions with Specialists or Other Care Team Provider:  Yes    Education and Discussions with Family / Patient:  Yes    Time Spent for Care: 33+ minutes  More than 50% of total time spent on counseling and coordination of care as described above      Current Length of Stay: 3 day(s)    Current Patient Status: Inpatient   Certification Statement: The patient will continue to require additional inpatient hospital stay due to Anemia workup    Discharge Plan:  Home when stable    Code Status: Level 3 - DNAR and DNI      Subjective:   Feels like crap-usual answer  Denies any chest pain  No abdominal pain  Feels weak  No fever or chills  Waiting to go for EGD    Objective:     Vitals:   Temp (24hrs), Av 3 °F (36 8 °C), Min:97 4 °F (36 3 °C), Max:98 9 °F (37 2 °C)    Temp:  [97 4 °F (36 3 °C)-98 9 °F (37 2 °C)] 98 1 °F (36 7 °C)  HR:  [76-95] 84  Resp:  [15-20] 16  BP: ()/(48-76) 102/55  SpO2:  [95 %-100 %] 95 %  Body mass index is 35 43 kg/m²  Input and Output Summary (last 24 hours): Intake/Output Summary (Last 24 hours) at 8/3/2020 1229  Last data filed at 8/3/2020 1158  Gross per 24 hour   Intake 710 ml   Output 300 ml   Net 410 ml           Physical Exam:     Vital signs are reviewed as above  Lying in bed and not in any distress  S1 and S2 audible  Chest clear to auscultation  Abdomen is obese  Nontender  Bowel sounds are audible  Conjunctivae and skin are pale  Also has some bruising on his right lower leg  Has chronic lower extremities edema  Awake and alert  Oriented x3  Gets anxious very easily  Oropharynx are hydrated      Additional Data:     Labs:    Results from last 7 days   Lab Units 20  0641  20  2026   WBC Thousand/uL 5 06   < > 5 93   HEMOGLOBIN g/dL 7 8*   < > 8 6*   HEMATOCRIT % 24 6*   < > 26 0*   PLATELETS Thousands/uL 194   < > 196   NEUTROS PCT %  --   --  73   LYMPHS PCT %  --   --  16   MONOS PCT %  --   --  8   EOS PCT %  --   --  2    < > = values in this interval not displayed  Results from last 7 days   Lab Units 20  0641 20  0443   POTASSIUM mmol/L 3 9 3 6   CHLORIDE mmol/L 102 101   CO2 mmol/L 26 26   BUN mg/dL 17 17   CREATININE mg/dL 0 83 0 92   CALCIUM mg/dL 7 9* 7 7*   ALK PHOS U/L  --  72   ALT U/L  --  18   AST U/L  --  20     Results from last 7 days   Lab Units 20  1949   INR  1 17       * I Have Reviewed All Lab Data Listed Above    * Additional Pertinent Lab Tests Reviewed: All Labs Within Last 24 Hours Reviewed      Recent Cultures (last 7 days):           Last 24 Hours Medication List:   acetaminophen, 650 mg, Oral, Q6H PRN, Jeremías Redd PA-C  aluminum-magnesium hydroxide-simethicone, 30 mL, Oral, Q6H PRN, Jeremías Redd PA-C  busPIRone, 10 mg, Oral, TID, Samantha Phillips PA-C  cyclobenzaprine, 10 mg, Oral, HS, Samantha Phillips PA-C  docusate sodium, 100 mg, Oral, BID PRN, Jeremías Redd PA-C  levothyroxine, 75 mcg, Oral, Early Morning, Samantha Phillips PA-C  ondansetron, 4 mg, Intravenous, Q6H PRN, Jeremías Redd PA-C  pantoprazole, 40 mg, Oral, BID AC, Jeremías Redd PA-C  venlafaxine, 150 mg, Oral, Daily, Jeremías Redd PA-C         Today, Patient Was Seen By: Collette Mcneill MD    ** Please Note: Dragon 360 Dictation voice to text software may have been used in the creation of this document   **

## 2020-08-03 NOTE — PLAN OF CARE
Problem: Potential for Falls  Goal: Patient will remain free of falls  Description: INTERVENTIONS:  - Assess patient frequently for physical needs  -  Identify cognitive and physical deficits and behaviors that affect risk of falls    -  [place] fall precautions as indicated by assessment   - Educate patient/family on patient safety including physical limitations  - Instruct patient to call for assistance with activity based on assessment  - Modify environment to reduce risk of injury  - Consider OT/PT consult to assist with strengthening/mobility  Outcome: Progressing     Problem: PAIN - ADULT  Goal: Verbalizes/displays adequate comfort level or baseline comfort level  Description: Interventions:  - Encourage patient to monitor pain and request assistance  - Assess pain using appropriate pain scale  - Administer analgesics based on type and severity of pain and evaluate response  - Implement non-pharmacological measures as appropriate and evaluate response  - Consider cultural and social influences on pain and pain management  - Notify physician/advanced practitioner if interventions unsuccessful or patient reports new pain  Outcome: Progressing     Problem: INFECTION - ADULT  Goal: Absence or prevention of progression during hospitalization  Description: INTERVENTIONS:  - Assess and monitor for signs and symptoms of infection  - Monitor lab/diagnostic results  - Monitor all insertion sites, i e  indwelling lines, tubes, and drains  - Monitor endotracheal if appropriate and nasal secretions for changes in amount and color  - [place] appropriate cooling/warming therapies per order  - Administer medications as ordered  - Instruct and encourage patient and family to use good hand hygiene technique  - Identify and instruct in appropriate isolation precautions for identified infection/condition  Outcome: Progressing  Goal: Absence of fever/infection during neutropenic period  Description: INTERVENTIONS:  - Monitor WBC    Outcome: Progressing     Problem: SAFETY ADULT  Goal: Patient will remain free of falls  Description: INTERVENTIONS:  - Assess patient frequently for physical needs  -  Identify cognitive and physical deficits and behaviors that affect risk of falls    -  [place] fall precautions as indicated by assessment   - Educate patient/family on patient safety including physical limitations  - Instruct patient to call for assistance with activity based on assessment  - Modify environment to reduce risk of injury  - Consider OT/PT consult to assist with strengthening/mobility  Outcome: Progressing  Goal: Maintain or return to baseline ADL function  Description: INTERVENTIONS:  -  Assess patient's ability to carry out ADLs; assess patient's baseline for ADL function and identify physical deficits which impact ability to perform ADLs (bathing, care of mouth/teeth, toileting, grooming, dressing, etc )  - Assess/evaluate cause of self-care deficits   - Assess range of motion  - Assess patient's mobility; develop plan if impaired  - Assess patient's need for assistive devices and provide as appropriate  - Encourage maximum independence but intervene and supervise when necessary  - Involve family in performance of ADLs  - Assess for home care needs following discharge   - Consider OT consult to assist with ADL evaluation and planning for discharge  - Provide patient education as appropriate  Outcome: Progressing  Goal: Maintain or return mobility status to optimal level  Description: INTERVENTIONS:  - Assess patient's baseline mobility status (ambulation, transfers, stairs, etc )    - Identify cognitive and physical deficits and behaviors that affect mobility  - Identify mobility aids required to assist with transfers and/or ambulation (gait belt, sit-to-stand, lift, walker, cane, etc )  - [place] fall precautions as indicated by assessment  - Record patient progress and toleration of activity level on Mobility SBAR; progress patient to next Phase/Stage  - Instruct patient to call for assistance with activity based on assessment  - Consider rehabilitation consult to assist with strengthening/weightbearing, etc   Outcome: Progressing     Problem: DISCHARGE PLANNING  Goal: Discharge to home or other facility with appropriate resources  Description: INTERVENTIONS:  - Identify barriers to discharge w/patient and caregiver  - Arrange for needed discharge resources and transportation as appropriate  - Identify discharge learning needs (meds, wound care, etc )  - Arrange for interpretive services to assist at discharge as needed  - Refer to Case Management Department for coordinating discharge planning if the patient needs post-hospital services based on physician/advanced practitioner order or complex needs related to functional status, cognitive ability, or social support system  Outcome: Progressing     Problem: Knowledge Deficit  Goal: Patient/family/caregiver demonstrates understanding of disease process, treatment plan, medications, and discharge instructions  Description: Complete learning assessment and assess knowledge base    Interventions:  - Provide teaching at level of understanding  - Provide teaching via preferred learning methods  Outcome: Progressing     Problem: HEMATOLOGIC - ADULT  Goal: Maintains hematologic stability  Description: INTERVENTIONS  - Assess for signs and symptoms of bleeding or hemorrhage  - Monitor labs  - Administer supportive blood products/factors as ordered and appropriate  Outcome: Progressing

## 2020-08-03 NOTE — PROGRESS NOTES
Pt asking to not be hooked up to Iv fluids while on colonoscopy prep due to the fall risk of him continuously in and out of the bathroom

## 2020-08-03 NOTE — ANESTHESIA POSTPROCEDURE EVALUATION
Post-Op Assessment Note    CV Status:  Stable  Pain Score: 0    Pain management: adequate     Mental Status:  Somnolent   Hydration Status:  Euvolemic   PONV Controlled:  Controlled   Airway Patency:  Patent      Post Op Vitals Reviewed: Yes      Staff: CRNA         No complications documented      BP 95/55 (08/03/20 1200)    Temp 98 1 °F (36 7 °C) (08/03/20 1200)    Pulse 76 (08/03/20 1200)   Resp 20 (08/03/20 1200)    SpO2 95 % (08/03/20 1200)

## 2020-08-03 NOTE — SOCIAL WORK
CM met with pt at bedside  Pt lives with his aunt-Jasmina and uncle Sinai Milligan in a one story house with 1-2 TORI  Pt is able to navigate steps and is independent with ADL's  He uses no DME's  He has never been in rehab or used [facility] services  He has anxiety and is treated with medication by his PCP-Dr Jimy Mary  He also has issues with autism  He quit smoking 12 years ago  He uses [facility] [employer]  [facility] and has no problem with co-pays  He does not have a POA or Advanced Directive and does not want info at this time  He is disabled due to his mental issues and he can drive, but does not have a car  His uncle will transport home when he is medically cleared  CM discussed d/c needs including [facility] services, but pt refuses  States that his uncle is very afraid of Covid and will not let strangers into the home  CM will continue to follow and will assess needs as hospitalization progresses  CM reviewed discharge planning process including the following: identifying help at home, patient preference for discharge planning needs, pharmacy preference, and availability of treatment team to discuss questions or concerns patient and/or family may have regarding understanding medications and recognizing signs and symptoms once discharged  CM also encouraged patient to follow up with all recommended appointments after discharge  Patient advised of importance for patient and family to participate in managing patients medical well being

## 2020-08-04 ENCOUNTER — ANESTHESIA (INPATIENT)
Dept: GASTROENTEROLOGY | Facility: HOSPITAL | Age: 60
DRG: 812 | End: 2020-08-04
Payer: COMMERCIAL

## 2020-08-04 ENCOUNTER — APPOINTMENT (INPATIENT)
Dept: CT IMAGING | Facility: HOSPITAL | Age: 60
DRG: 812 | End: 2020-08-04
Payer: COMMERCIAL

## 2020-08-04 ENCOUNTER — ANESTHESIA EVENT (INPATIENT)
Dept: GASTROENTEROLOGY | Facility: HOSPITAL | Age: 60
DRG: 812 | End: 2020-08-04
Payer: COMMERCIAL

## 2020-08-04 ENCOUNTER — APPOINTMENT (INPATIENT)
Dept: GASTROENTEROLOGY | Facility: HOSPITAL | Age: 60
DRG: 812 | End: 2020-08-04
Attending: INTERNAL MEDICINE
Payer: COMMERCIAL

## 2020-08-04 PROBLEM — K44.9 HIATAL HERNIA: Status: ACTIVE | Noted: 2020-08-04

## 2020-08-04 PROBLEM — T14.8XXA BRUISING: Status: ACTIVE | Noted: 2020-08-04

## 2020-08-04 LAB
ERYTHROCYTE [DISTWIDTH] IN BLOOD BY AUTOMATED COUNT: 13.5 % (ref 11.6–15.1)
HCT VFR BLD AUTO: 24.9 % (ref 36.5–49.3)
HCT VFR BLD AUTO: 25.6 % (ref 36.5–49.3)
HGB BLD-MCNC: 7.9 G/DL (ref 12–17)
HGB BLD-MCNC: 8.1 G/DL (ref 12–17)
MCH RBC QN AUTO: 29.6 PG (ref 26.8–34.3)
MCHC RBC AUTO-ENTMCNC: 31.6 G/DL (ref 31.4–37.4)
MCV RBC AUTO: 93 FL (ref 82–98)
PLATELET # BLD AUTO: 196 THOUSANDS/UL (ref 149–390)
PMV BLD AUTO: 9.1 FL (ref 8.9–12.7)
RBC # BLD AUTO: 2.74 MILLION/UL (ref 3.88–5.62)
WBC # BLD AUTO: 5.51 THOUSAND/UL (ref 4.31–10.16)

## 2020-08-04 PROCEDURE — 70450 CT HEAD/BRAIN W/O DYE: CPT

## 2020-08-04 PROCEDURE — 88305 TISSUE EXAM BY PATHOLOGIST: CPT | Performed by: PATHOLOGY

## 2020-08-04 PROCEDURE — G1004 CDSM NDSC: HCPCS

## 2020-08-04 PROCEDURE — 85027 COMPLETE CBC AUTOMATED: CPT | Performed by: INTERNAL MEDICINE

## 2020-08-04 PROCEDURE — 85014 HEMATOCRIT: CPT | Performed by: PHYSICIAN ASSISTANT

## 2020-08-04 PROCEDURE — 0DBL8ZZ EXCISION OF TRANSVERSE COLON, VIA NATURAL OR ARTIFICIAL OPENING ENDOSCOPIC: ICD-10-PCS | Performed by: INTERNAL MEDICINE

## 2020-08-04 PROCEDURE — 45385 COLONOSCOPY W/LESION REMOVAL: CPT | Performed by: INTERNAL MEDICINE

## 2020-08-04 PROCEDURE — 85018 HEMOGLOBIN: CPT | Performed by: PHYSICIAN ASSISTANT

## 2020-08-04 PROCEDURE — 99232 SBSQ HOSP IP/OBS MODERATE 35: CPT | Performed by: FAMILY MEDICINE

## 2020-08-04 RX ORDER — FERROUS SULFATE 325(65) MG
325 TABLET ORAL
Status: DISCONTINUED | OUTPATIENT
Start: 2020-08-05 | End: 2020-08-07 | Stop reason: HOSPADM

## 2020-08-04 RX ORDER — SODIUM CHLORIDE, SODIUM LACTATE, POTASSIUM CHLORIDE, CALCIUM CHLORIDE 600; 310; 30; 20 MG/100ML; MG/100ML; MG/100ML; MG/100ML
INJECTION, SOLUTION INTRAVENOUS CONTINUOUS PRN
Status: DISCONTINUED | OUTPATIENT
Start: 2020-08-04 | End: 2020-08-04

## 2020-08-04 RX ORDER — LIDOCAINE HYDROCHLORIDE 10 MG/ML
INJECTION, SOLUTION EPIDURAL; INFILTRATION; INTRACAUDAL; PERINEURAL AS NEEDED
Status: DISCONTINUED | OUTPATIENT
Start: 2020-08-04 | End: 2020-08-04

## 2020-08-04 RX ORDER — PROPOFOL 10 MG/ML
INJECTION, EMULSION INTRAVENOUS AS NEEDED
Status: DISCONTINUED | OUTPATIENT
Start: 2020-08-04 | End: 2020-08-04

## 2020-08-04 RX ORDER — DEXTROSE AND SODIUM CHLORIDE 5; .9 G/100ML; G/100ML
100 INJECTION, SOLUTION INTRAVENOUS CONTINUOUS
Status: DISCONTINUED | OUTPATIENT
Start: 2020-08-04 | End: 2020-08-05

## 2020-08-04 RX ADMIN — LIDOCAINE HYDROCHLORIDE 50 MG: 10 INJECTION, SOLUTION EPIDURAL; INFILTRATION; INTRACAUDAL; PERINEURAL at 14:15

## 2020-08-04 RX ADMIN — LEVOTHYROXINE SODIUM 75 MCG: 75 TABLET ORAL at 05:37

## 2020-08-04 RX ADMIN — PANTOPRAZOLE SODIUM 40 MG: 40 TABLET, DELAYED RELEASE ORAL at 05:37

## 2020-08-04 RX ADMIN — DEXTROSE AND SODIUM CHLORIDE 100 ML/HR: 5; .9 INJECTION, SOLUTION INTRAVENOUS at 10:43

## 2020-08-04 RX ADMIN — SODIUM CHLORIDE, SODIUM LACTATE, POTASSIUM CHLORIDE, AND CALCIUM CHLORIDE: .6; .31; .03; .02 INJECTION, SOLUTION INTRAVENOUS at 14:15

## 2020-08-04 RX ADMIN — VENLAFAXINE HYDROCHLORIDE 150 MG: 150 CAPSULE, EXTENDED RELEASE ORAL at 09:48

## 2020-08-04 RX ADMIN — PROPOFOL 50 MG: 10 INJECTION, EMULSION INTRAVENOUS at 14:22

## 2020-08-04 RX ADMIN — CYCLOBENZAPRINE HYDROCHLORIDE 10 MG: 10 TABLET, FILM COATED ORAL at 21:54

## 2020-08-04 RX ADMIN — BUSPIRONE HYDROCHLORIDE 10 MG: 5 TABLET ORAL at 15:26

## 2020-08-04 RX ADMIN — PROPOFOL 100 MG: 10 INJECTION, EMULSION INTRAVENOUS at 14:15

## 2020-08-04 RX ADMIN — BUSPIRONE HYDROCHLORIDE 10 MG: 5 TABLET ORAL at 09:48

## 2020-08-04 RX ADMIN — PROPOFOL 50 MG: 10 INJECTION, EMULSION INTRAVENOUS at 14:17

## 2020-08-04 RX ADMIN — BUSPIRONE HYDROCHLORIDE 10 MG: 5 TABLET ORAL at 21:54

## 2020-08-04 NOTE — UTILIZATION REVIEW
Continued Stay Review    Date: 08/04/2020                        Current Patient Class: Inpatient  Current Level of Care: Med/Surg    HPI:60 y o  male initially admitted on 07/31/2020   Symptomatic anemia  Assessment/Plan: Unwitnessed fall this AM not on blood thinners - hit head  CT head  Stat H&H  Fall precautions  Colonoscopy : Pending  Continue IV Fe  Consult Hematology due to excessive bruising and amenia  Pertinent Labs/Diagnostic Results:   08/04/2020 @ 0120  CT head: No intracranial hemorrhage or calvarial fracture  08/04/2020 @ 1561  CT abd/pel:  No acute intra-abdominal pathology within the limits of this unenhanced examination  Moderate-sized hiatal hernia  Cholelithiasis  Results from last 7 days   Lab Units 08/02/20  2325   SARS-COV-2  Negative     Results from last 7 days   Lab Units 08/04/20  0552 08/04/20  0203 08/03/20  0641 08/02/20  0443 08/01/20  0902  07/31/20 2026   WBC Thousand/uL 5 51  --  5 06 4 92  --    < > 5 93   HEMOGLOBIN g/dL 8 1* 7 9* 7 8* 7 8* 8 3*   < > 8 6*   HEMATOCRIT % 25 6* 24 9* 24 6* 24 6* 25 5*   < > 26 0*   PLATELETS Thousands/uL 196  --  194 181  --    < > 196   NEUTROS ABS Thousands/µL  --   --   --   --   --   --  4 37    < > = values in this interval not displayed       Results from last 7 days   Lab Units 08/03/20  0641 08/02/20  0443 08/01/20  0552 07/31/20  1944   SODIUM mmol/L 132* 134* 132* 131*   POTASSIUM mmol/L 3 9 3 6 3 7 4 1   CHLORIDE mmol/L 102 101 101 99*   CO2 mmol/L 26 26 24 24   ANION GAP mmol/L 4 7 7 8   BUN mg/dL 17 17 15 17   CREATININE mg/dL 0 83 0 92 1 02 1 05   EGFR ml/min/1 73sq m 95 89 79 76   CALCIUM mg/dL 7 9* 7 7* 7 9* 7 8*     Results from last 7 days   Lab Units 08/02/20  0443 08/01/20  0552 07/31/20  1944   AST U/L 20 19 35   ALT U/L 18 16 19   ALK PHOS U/L 72 73 78   TOTAL PROTEIN g/dL 5 8* 5 8* 6 2*   ALBUMIN g/dL 2 8* 2 8* 3 0*   TOTAL BILIRUBIN mg/dL 1 30* 1 40* 1 20*     Results from last 7 days   Lab Units 08/03/20  0641 08/02/20  0443 08/01/20  0552 07/31/20 1944   GLUCOSE RANDOM mg/dL 104 107 114 115     Results from last 7 days   Lab Units 07/31/20 1944   TROPONIN I ng/mL <0 02     Results from last 7 days   Lab Units 07/31/20 1949   D-DIMER QUANTITATIVE ug/ml FEU 1 54*     Results from last 7 days   Lab Units 07/31/20 1949   PROTIME seconds 14 4   INR  1 17   PTT seconds 28     Results from last 7 days   Lab Units 07/31/20 1944   TSH 3RD GENERATON uIU/mL 4 913*     Results from last 7 days   Lab Units 07/31/20 1944   NT-PRO BNP pg/mL 299*     Results from last 7 days   Lab Units 08/01/20  1454   FERRITIN ng/mL 122     Vital Signs: /78 (BP Location: Left arm)   Pulse 78   Temp 98 1 °F (36 7 °C) (Oral)   Resp 17   Ht 5' 10"   Wt 112 kg (246 lb 14 6 oz)   SpO2 100%   BMI 35 43 kg/m²     Medications:   Scheduled Medications:  busPIRone, 10 mg, Oral, TID  cyclobenzaprine, 10 mg, Oral, HS  [START ON 8/5/2020] ferrous sulfate, 325 mg, Oral, Daily With Breakfast  levothyroxine, 75 mcg, Oral, Early Morning  pantoprazole, 40 mg, Oral, Early Morning  venlafaxine, 150 mg, Oral, Daily    Continuous IV Infusions:  dextrose 5 % and sodium chloride 0 9 %, 100 mL/hr, Intravenous, Continuous    PRN Meds:  acetaminophen, 650 mg, Oral, Q6H PRN  aluminum-magnesium hydroxide-simethicone, 30 mL, Oral, Q6H PRN  docusate sodium, 100 mg, Oral, BID PRN  ondansetron, 4 mg, Intravenous, Q6H PRN    Discharge Plan: TBD    Network Utilization Review Department  [email or web address]  ATTENTION: Please call with any questions or concerns to [phone] and carefully listen to the prompts so that you are directed to the right person  All voicemails are confidential   Lynette Kussmaul all requests for admission clinical reviews, approved or denied determinations and any other requests to dedicated fax number below belonging to the campus where the patient is receiving treatment   List of dedicated fax numbers for the Facilities:  FACILITY NAME UR FAX NUMBER   ADMISSION DENIALS (Administrative/Medical Necessity) [phone]   PARENT CHILD HEALTH (Maternity/NICU/Pediatrics) [phone]   [facility] [phone]   [facility] [phone]   Reina Barrientos [phone]   [address] [phone]   [facility]  [phone]   [address] [place] [address], [address]  [address] [facility] [address] [facility] [phone]

## 2020-08-04 NOTE — PROGRESS NOTES
Progress Note - Chad Escoto 1960, 63 y o  male MRN: [id number]    Unit/Bed#: -01 Encounter: [id number]    Primary Care Provider: Morris Guerrero MD   Date and time admitted to hospital: 7/31/2020  6:45 PM        Lower extremity edema  Assessment & Plan  Chronic lower extremities edema  He also had a fall with some bruising on his legs especially the right leg  Venous duplex during this admission was negative for DVT  Muscle spasm  Assessment & Plan  Muscle spasms are chronic  No NSAIDs  Continue Flexeril at bedtime  Hyponatremia  Assessment & Plan  Patient reports "no food" for the past 2 days  Suspect he may be dehydrated  Will provide some fluids especially in view of patient going for colonoscopy today  Hypothyroidism  Assessment & Plan  Continue home dose levothyroxine  No need to change any does specially in acute setting and patient follow-up on outpatient basis with repeat TSH and any further adjustments in his replacement    Anxiety disorder  Assessment & Plan  Known history of anxiety and patient presents with anxious affect  Continue with home medications, such as Effexor and BuSpar  Avoid benzodiazepines  * Symptomatic anemia  Assessment & Plan  Iron deficiency anemia  EGD did not reveal any source of bleeding  Plan for colonoscopy on 08/04/2020  Continue with IV iron  He would need iron supplement on discharge  Consulted hematology due to history of excessive bruising and also anemia  VTE Pharmacologic Prophylaxis:   Pharmacologic:  On hold due to GI bleed  Mechanical VTE Prophylaxis in Place: Yes    Patient Centered Rounds: I have performed bedside rounds with nursing staff today  Discussions with Specialists or Other Care Team Provider:  GI    Education and Discussions with Family / Patient:  Patient    Time Spent for Care: 20 minutes  More than 50% of total time spent on counseling and coordination of care as described above      Current Length of Stay: 4 day(s)    Current Patient Status: Inpatient   Certification Statement: The patient will continue to require additional inpatient hospital stay due to Anemia workup    Discharge Plan:  24-48 hours    Code Status: Level 3 - DNAR and DNI      Subjective:   Patient was seen and examined today  He is quite concerned about the history of frequent falls and bruising, especially on his right lower extremity  He is also very stressed and hungry prior to colonoscopy  Objective:     Vitals:   Temp (24hrs), Av 3 °F (36 8 °C), Min:98 1 °F (36 7 °C), Max:98 5 °F (36 9 °C)    Temp:  [98 1 °F (36 7 °C)-98 5 °F (36 9 °C)] 98 1 °F (36 7 °C)  HR:  [80-95] 82  Resp:  [18-20] 18  BP: (117-124)/(70-71) 124/71  SpO2:  [99 %-100 %] 100 %  Body mass index is 35 43 kg/m²  Input and Output Summary (last 24 hours): Intake/Output Summary (Last 24 hours) at 2020 1318  Last data filed at 8/3/2020 2300  Gross per 24 hour   Intake 380 ml   Output 250 ml   Net 130 ml       Physical Exam:     Physical Exam   Constitutional: He appears well-developed and well-nourished  Cardiovascular: Normal rate, regular rhythm and normal heart sounds  Pulmonary/Chest: Effort normal and breath sounds normal  No respiratory distress  Abdominal: Soft  There is no abdominal tenderness  Musculoskeletal:         General: No deformity or edema  Neurological: He is alert  Skin: Skin is warm  Capillary refill takes less than 2 seconds  Ecchymosis noted  No rash noted  No erythema  Multiple ecchymosis on the right upper extremity in the antecubital fossa  Also, bruises at different stages of healing on the right lower extremity around the knee  Psychiatric: He has a normal mood and affect         Additional Data:     Labs:    Results from last 7 days   Lab Units 20  0552  20   WBC Thousand/uL 5 51   < > 5 93   HEMOGLOBIN g/dL 8 1*   < > 8 6*   HEMATOCRIT % 25 6*   < > 26 0*   PLATELETS Thousands/uL 196   < > 196 NEUTROS PCT %  --   --  73   LYMPHS PCT %  --   --  16   MONOS PCT %  --   --  8   EOS PCT %  --   --  2    < > = values in this interval not displayed  Results from last 7 days   Lab Units 08/03/20  0641 08/02/20  0443   SODIUM mmol/L 132* 134*   POTASSIUM mmol/L 3 9 3 6   CHLORIDE mmol/L 102 101   CO2 mmol/L 26 26   BUN mg/dL 17 17   CREATININE mg/dL 0 83 0 92   ANION GAP mmol/L 4 7   CALCIUM mg/dL 7 9* 7 7*   ALBUMIN g/dL  --  2 8*   TOTAL BILIRUBIN mg/dL  --  1 30*   ALK PHOS U/L  --  72   ALT U/L  --  18   AST U/L  --  20   GLUCOSE RANDOM mg/dL 104 107     Results from last 7 days   Lab Units 07/31/20  1949   INR  1 17                       * I Have Reviewed All Lab Data Listed Above  * Additional Pertinent Lab Tests Reviewed: All Labs Within Last 24 Hours Reviewed    Imaging:    Venous duplex  CT abdomen and pelvis  CT head  Recent Cultures (last 7 days):           Last 24 Hours Medication List:   acetaminophen, 650 mg, Oral, Q6H PRN, Sandra Gutierrez III, MD  aluminum-magnesium hydroxide-simethicone, 30 mL, Oral, Q6H PRN, Eufemia Wong MD  busPIRone, 10 mg, Oral, TID, Sandra Gutierrez III, MD  cyclobenzaprine, 10 mg, Oral, HS, Sandra Gutierrez III, MD  dextrose 5 % and sodium chloride 0 9 %, 100 mL/hr, Intravenous, Continuous, Jasper Moreira MD, Last Rate: 100 mL/hr (08/04/20 1043)  docusate sodium, 100 mg, Oral, BID PRN, Eufemia Wong MD  levothyroxine, 75 mcg, Oral, Early Morning, Eufemia Wong MD  ondansetron, 4 mg, Intravenous, Q6H PRN, Eufemia Wong MD  pantoprazole, 40 mg, Oral, Early Morning, Eufemia Wong MD  venlafaxine, 150 mg, Oral, Daily, Eufemia Wong MD         Today, Patient Was Seen By: Jasper Moreira MD    ** Please Note: Dictation voice to text software may have been used in the creation of this document   **

## 2020-08-04 NOTE — NURSING NOTE
Pt reports trying to pull his pants down so he can go to the restroom when he fell over  SLIM at bedside bruise noted to right side of the forehead Pt denies pain, ice applied, for CT SLIM at bedside    Kerry Felder RN  1:03 AM  08/04/20

## 2020-08-04 NOTE — QUICK NOTE
Called evaluate patient for unwitnessed fall  Patient reports he was in the bathroom about to have a bowel movement when he passed out  He reports he came to before he had the ground was able to twist his body to the right to avoid hitting his face fully on the ground  He reports he hit his right forehead on the ground, denies any pain currently  Reports he was able to get up himself and ambulate back to his bed  Patient reports he has a history of syncopizing when having a bowel movement, also reports he feels lightheaded and weak constantly due to his low hemoglobin and the fact that he has not eaten in over a day as he is doing a colonoscopy prep  Vital signs within normal limits  Bruising noted on the right forehead, no pain with palpation of skull  Pupils are PEERL  C-spine nontender to palpation  Patient is moving all extremities independently    CT scan of head ordered by at home slim provider  Patient is not currently on blood thinners  Will follow-up on CT scan  Stat H&H ordered, if less than 7 0 will transfuse  Fall precautions    Continue to monitor patient

## 2020-08-04 NOTE — ANESTHESIA POSTPROCEDURE EVALUATION
Post-Op Assessment Note    CV Status:  Stable  Pain Score: 0    Pain management: adequate     Mental Status:  Arousable   Hydration Status:  Stable   PONV Controlled:  None   Airway Patency:  Patent      Post Op Vitals Reviewed: Yes      Staff: Anesthesiologist, CRNA         No complications documented      /61 (08/04/20 1430)    Temp 98 6 °F (37 °C) (08/04/20 1430)    Pulse 71 (08/04/20 1430)   Resp 18 (08/04/20 1430)    SpO2 100 % (08/04/20 1430)

## 2020-08-04 NOTE — QUICK NOTE
Pt reports falling and hitting his face; unwitnessed fall  Pt not maintained on blood thinners  Will order CT head and ask in-house AP Natalie to assess     Ice to affected area

## 2020-08-04 NOTE — PLAN OF CARE
Problem: Potential for Falls  Goal: Patient will remain free of falls  Description: INTERVENTIONS:  - Assess patient frequently for physical needs  -  Identify cognitive and physical deficits and behaviors that affect risk of falls    -  [place] fall precautions as indicated by assessment   - Educate patient/family on patient safety including physical limitations  - Instruct patient to call for assistance with activity based on assessment  - Modify environment to reduce risk of injury  - Consider OT/PT consult to assist with strengthening/mobility  Outcome: Progressing     Problem: PAIN - ADULT  Goal: Verbalizes/displays adequate comfort level or baseline comfort level  Description: Interventions:  - Encourage patient to monitor pain and request assistance  - Assess pain using appropriate pain scale  - Administer analgesics based on type and severity of pain and evaluate response  - Implement non-pharmacological measures as appropriate and evaluate response  - Consider cultural and social influences on pain and pain management  - Notify physician/advanced practitioner if interventions unsuccessful or patient reports new pain  Outcome: Progressing     Problem: INFECTION - ADULT  Goal: Absence or prevention of progression during hospitalization  Description: INTERVENTIONS:  - Assess and monitor for signs and symptoms of infection  - Monitor lab/diagnostic results  - Monitor all insertion sites, i e  indwelling lines, tubes, and drains  - Monitor endotracheal if appropriate and nasal secretions for changes in amount and color  - [place] appropriate cooling/warming therapies per order  - Administer medications as ordered  - Instruct and encourage patient and family to use good hand hygiene technique  - Identify and instruct in appropriate isolation precautions for identified infection/condition  Outcome: Progressing     Problem: SAFETY ADULT  Goal: Patient will remain free of falls  Description: INTERVENTIONS:  - Assess patient frequently for physical needs  -  Identify cognitive and physical deficits and behaviors that affect risk of falls    -  [place] fall precautions as indicated by assessment   - Educate patient/family on patient safety including physical limitations  - Instruct patient to call for assistance with activity based on assessment  - Modify environment to reduce risk of injury  - Consider OT/PT consult to assist with strengthening/mobility  Outcome: Progressing  Goal: Maintain or return to baseline ADL function  Description: INTERVENTIONS:  -  Assess patient's ability to carry out ADLs; assess patient's baseline for ADL function and identify physical deficits which impact ability to perform ADLs (bathing, care of mouth/teeth, toileting, grooming, dressing, etc )  - Assess/evaluate cause of self-care deficits   - Assess range of motion  - Assess patient's mobility; develop plan if impaired  - Assess patient's need for assistive devices and provide as appropriate  - Encourage maximum independence but intervene and supervise when necessary  - Involve family in performance of ADLs  - Assess for home care needs following discharge   - Consider OT consult to assist with ADL evaluation and planning for discharge  - Provide patient education as appropriate  Outcome: Progressing  Goal: Maintain or return mobility status to optimal level  Description: INTERVENTIONS:  - Assess patient's baseline mobility status (ambulation, transfers, stairs, etc )    - Identify cognitive and physical deficits and behaviors that affect mobility  - Identify mobility aids required to assist with transfers and/or ambulation (gait belt, sit-to-stand, lift, walker, cane, etc )  - [place] fall precautions as indicated by assessment  - Record patient progress and toleration of activity level on Mobility SBAR; progress patient to next Phase/Stage  - Instruct patient to call for assistance with activity based on assessment  - Consider rehabilitation consult to assist with strengthening/weightbearing, etc   Outcome: Progressing     Problem: DISCHARGE PLANNING  Goal: Discharge to home or other facility with appropriate resources  Description: INTERVENTIONS:  - Identify barriers to discharge w/patient and caregiver  - Arrange for needed discharge resources and transportation as appropriate  - Identify discharge learning needs (meds, wound care, etc )  - Arrange for interpretive services to assist at discharge as needed  - Refer to Case Management Department for coordinating discharge planning if the patient needs post-hospital services based on physician/advanced practitioner order or complex needs related to functional status, cognitive ability, or social support system  Outcome: Progressing     Problem: Knowledge Deficit  Goal: Patient/family/caregiver demonstrates understanding of disease process, treatment plan, medications, and discharge instructions  Description: Complete learning assessment and assess knowledge base    Interventions:  - Provide teaching at level of understanding  - Provide teaching via preferred learning methods  Outcome: Progressing     Problem: HEMATOLOGIC - ADULT  Goal: Maintains hematologic stability  Description: INTERVENTIONS  - Assess for signs and symptoms of bleeding or hemorrhage  - Monitor labs  - Administer supportive blood products/factors as ordered and appropriate  Outcome: Progressing

## 2020-08-04 NOTE — ASSESSMENT & PLAN NOTE
Patient with excessive poorly healing bruising on the right lower extremity and right upper extremity  He is also presenting with acute anemia  Still working up anemia, but will consult hematology due to excessive bruising

## 2020-08-04 NOTE — ANESTHESIA PREPROCEDURE EVALUATION
Procedure:  COLONOSCOPY    Relevant Problems   ANESTHESIA (within normal limits)  EGD 8/3/20 under MAC without issue      CARDIO (within normal limits)      ENDO   (+) Hypothyroidism      GI/HEPATIC  Confirmed NPO appropriate   (+) Hiatal hernia      /RENAL (within normal limits)      HEMATOLOGY   (+) Symptomatic anemia (Admitted 8/1 with symptomatic anemia, fell at home  No overt GIB and H/H stable since admisison without need for transfusion)      Milla Lowery yesterday, hit right side of orbit/cheek on floor, CTH negative for acute intracranial process  C spine not imaged, no tenderness to palpation, no pain with ROM and patient denies numbness/tingling or weakness down arms/legs      NEURO/PSYCH   (+) Anxiety disorder      PULMONARY (within normal limits)        Physical Exam    Airway  Comment: Narrow palate  Mallampati score: III  TM Distance: >3 FB  Neck ROM: full     Dental   No notable dental hx     Cardiovascular  Rhythm: regular, Rate: normal, Cardiovascular exam normal    Pulmonary  Pulmonary exam normal Breath sounds clear to auscultation,     Other Findings        Anesthesia Plan  ASA Score- 2     Anesthesia Type- IV sedation with anesthesia with ASA Monitors  Additional Monitors:   Airway Plan:     Comment: I discussed the risks and benefits of IV sedation anesthesia including the possibility of the need to convert to general anesthesia and the potential risk of awareness  The patient was given the opportunity to ask questions, which were answered          Plan Factors-Exercise tolerance (METS): >4 METS  Chart reviewed  EKG reviewed  Imaging results reviewed  Existing labs reviewed  Patient is not a current smoker  Induction- intravenous  Postoperative Plan-     Informed Consent- Anesthetic plan and risks discussed with patient  I personally reviewed this patient with the CRNA  Discussed and agreed on the Anesthesia Plan with the CRNA  Vicky Maldonado         [facility] 8/4/20: IMPRESSION:  No intracranial hemorrhage or calvarial fracture        Lab Results   Component Value Date    WBC 5 51 08/04/2020    HGB 8 1 (L) 08/04/2020    HCT 25 6 (L) 08/04/2020    MCV 93 08/04/2020     08/04/2020     Lab Results   Component Value Date    SODIUM 132 (L) 08/03/2020    K 3 9 08/03/2020     08/03/2020    CO2 26 08/03/2020    BUN 17 08/03/2020    CREATININE 0 83 08/03/2020    GLUC 104 08/03/2020    CALCIUM 7 9 (L) 08/03/2020     Lab Results   Component Value Date    ALT 18 08/02/2020    AST 20 08/02/2020    ALKPHOS 72 08/02/2020     Lab Results   Component Value Date    INR 1 17 07/31/2020    PROTIME 14 4 07/31/2020     No results found for: HGBA1C

## 2020-08-05 VITALS
SYSTOLIC BLOOD PRESSURE: 117 MMHG | HEIGHT: 70 IN | TEMPERATURE: 98.8 F | BODY MASS INDEX: 35.35 KG/M2 | OXYGEN SATURATION: 100 % | RESPIRATION RATE: 16 BRPM | WEIGHT: 246.91 LBS | HEART RATE: 97 BPM | DIASTOLIC BLOOD PRESSURE: 73 MMHG

## 2020-08-05 LAB
ANION GAP SERPL CALCULATED.3IONS-SCNC: 8 MMOL/L (ref 4–13)
BASOPHILS # BLD AUTO: 0.02 THOUSANDS/ΜL (ref 0–0.1)
BASOPHILS NFR BLD AUTO: 1 % (ref 0–1)
BUN SERPL-MCNC: 11 MG/DL (ref 5–25)
CALCIUM SERPL-MCNC: 7.9 MG/DL (ref 8.3–10.1)
CHLORIDE SERPL-SCNC: 101 MMOL/L (ref 100–108)
CK MB SERPL-MCNC: 1.3 NG/ML (ref 0–5)
CK MB SERPL-MCNC: <1 % (ref 0–2.5)
CK SERPL-CCNC: 170 U/L (ref 39–308)
CO2 SERPL-SCNC: 26 MMOL/L (ref 21–32)
CREAT SERPL-MCNC: 0.84 MG/DL (ref 0.6–1.3)
EOSINOPHIL # BLD AUTO: 0.08 THOUSAND/ΜL (ref 0–0.61)
EOSINOPHIL NFR BLD AUTO: 2 % (ref 0–6)
ERYTHROCYTE [DISTWIDTH] IN BLOOD BY AUTOMATED COUNT: 13.8 % (ref 11.6–15.1)
ERYTHROCYTE [SEDIMENTATION RATE] IN BLOOD: 10 MM/HOUR (ref 0–19)
FOLATE BLD-MCNC: 331 NG/ML
FOLATE RBC-MCNC: 1226 NG/ML
GFR SERPL CREATININE-BSD FRML MDRD: 95 ML/MIN/1.73SQ M
GLUCOSE SERPL-MCNC: 94 MG/DL (ref 65–140)
HCT VFR BLD AUTO: 24.2 % (ref 36.5–49.3)
HCT VFR BLD AUTO: 27 % (ref 37.5–51)
HGB BLD-MCNC: 7.7 G/DL (ref 12–17)
IMM GRANULOCYTES # BLD AUTO: 0.05 THOUSAND/UL (ref 0–0.2)
IMM GRANULOCYTES NFR BLD AUTO: 1 % (ref 0–2)
LYMPHOCYTES # BLD AUTO: 0.65 THOUSANDS/ΜL (ref 0.6–4.47)
LYMPHOCYTES NFR BLD AUTO: 17 % (ref 14–44)
MCH RBC QN AUTO: 30 PG (ref 26.8–34.3)
MCHC RBC AUTO-ENTMCNC: 31.8 G/DL (ref 31.4–37.4)
MCV RBC AUTO: 94 FL (ref 82–98)
METHYLMALONATE SERPL-SCNC: 131 NMOL/L (ref 0–378)
MONOCYTES # BLD AUTO: 0.28 THOUSAND/ΜL (ref 0.17–1.22)
MONOCYTES NFR BLD AUTO: 7 % (ref 4–12)
NEUTROPHILS # BLD AUTO: 2.81 THOUSANDS/ΜL (ref 1.85–7.62)
NEUTS SEG NFR BLD AUTO: 72 % (ref 43–75)
NRBC BLD AUTO-RTO: 0 /100 WBCS
PLATELET # BLD AUTO: 189 THOUSANDS/UL (ref 149–390)
PMV BLD AUTO: 9.1 FL (ref 8.9–12.7)
POTASSIUM SERPL-SCNC: 3.7 MMOL/L (ref 3.5–5.3)
RBC # BLD AUTO: 2.57 MILLION/UL (ref 3.88–5.62)
SL AMB DISCLAIMER: NORMAL
SODIUM SERPL-SCNC: 135 MMOL/L (ref 136–145)
WBC # BLD AUTO: 3.89 THOUSAND/UL (ref 4.31–10.16)

## 2020-08-05 PROCEDURE — 82550 ASSAY OF CK (CPK): CPT | Performed by: GENERAL PRACTICE

## 2020-08-05 PROCEDURE — 97167 OT EVAL HIGH COMPLEX 60 MIN: CPT

## 2020-08-05 PROCEDURE — 85025 COMPLETE CBC W/AUTO DIFF WBC: CPT | Performed by: INTERNAL MEDICINE

## 2020-08-05 PROCEDURE — 85652 RBC SED RATE AUTOMATED: CPT | Performed by: GENERAL PRACTICE

## 2020-08-05 PROCEDURE — 80048 BASIC METABOLIC PNL TOTAL CA: CPT | Performed by: INTERNAL MEDICINE

## 2020-08-05 PROCEDURE — 83615 LACTATE (LD) (LDH) ENZYME: CPT | Performed by: GENERAL PRACTICE

## 2020-08-05 PROCEDURE — 97163 PT EVAL HIGH COMPLEX 45 MIN: CPT

## 2020-08-05 PROCEDURE — 82553 CREATINE MB FRACTION: CPT | Performed by: GENERAL PRACTICE

## 2020-08-05 PROCEDURE — 99232 SBSQ HOSP IP/OBS MODERATE 35: CPT | Performed by: GENERAL PRACTICE

## 2020-08-05 PROCEDURE — 86140 C-REACTIVE PROTEIN: CPT | Performed by: GENERAL PRACTICE

## 2020-08-05 RX ADMIN — BUSPIRONE HYDROCHLORIDE 10 MG: 5 TABLET ORAL at 21:34

## 2020-08-05 RX ADMIN — FERROUS SULFATE TAB 325 MG (65 MG ELEMENTAL FE) 325 MG: 325 (65 FE) TAB at 09:49

## 2020-08-05 RX ADMIN — BUSPIRONE HYDROCHLORIDE 10 MG: 5 TABLET ORAL at 09:47

## 2020-08-05 RX ADMIN — LEVOTHYROXINE SODIUM 75 MCG: 75 TABLET ORAL at 05:46

## 2020-08-05 RX ADMIN — BUSPIRONE HYDROCHLORIDE 10 MG: 5 TABLET ORAL at 16:52

## 2020-08-05 RX ADMIN — CYCLOBENZAPRINE HYDROCHLORIDE 10 MG: 10 TABLET, FILM COATED ORAL at 21:34

## 2020-08-05 RX ADMIN — PANTOPRAZOLE SODIUM 40 MG: 40 TABLET, DELAYED RELEASE ORAL at 05:46

## 2020-08-05 RX ADMIN — VENLAFAXINE HYDROCHLORIDE 150 MG: 150 CAPSULE, EXTENDED RELEASE ORAL at 09:47

## 2020-08-05 NOTE — ASSESSMENT & PLAN NOTE
Iron deficiency anemia  EGD did not reveal any source of bleeding  colonoscopy on 08/04/2020 with diverticulosis  Continue with IV iron  needs iron supplement on discharge    Heme consult pending with low wbc and hgh  hgb stable

## 2020-08-05 NOTE — PLAN OF CARE
Problem: PHYSICAL THERAPY ADULT  Goal: Performs mobility at highest level of function for planned discharge setting  See evaluation for individualized goals  Description: Treatment/Interventions: Functional transfer training, LE strengthening/ROM, Therapeutic exercise, Elevations, Endurance training, Patient/family training, Bed mobility, Gait training, Spoke to nursing, OT  Equipment Recommended: (TBD; none anticipated)       See flowsheet documentation for full assessment, interventions and recommendations  Note: Prognosis: Good  Problem List: Decreased strength, Decreased endurance, Impaired balance, Decreased mobility, Pain  Assessment: Pt is 63 y o  male seen for PT evaluation on 8/5/2020 s/p admit to [facility] on 7/31/2020 w/ Symptomatic anemia  Patient reporting constant shortness of breath x1 month and chest muscle spasm for many years  Reports he was told by his PCP to be evaluated at the ED due to concern of decreasing hemoglobin and hematocrit  Patient admits to a fall out of the shower on 07/08, admitting since then to ecchymosis of right lower extremity near knee, swelling, ttp, no issues walking  PT consulted to assess pt's functional mobility and d/c needs  Order placed for PT eval and tx, w/ up w/ A order  Performed at least 2 patient identifiers during session: Name and wristband  Comorbidities affecting pt's physical performance at time of assessment include: anxiety, arthritis, colon polyp, disease of thyroid gland, muscle weakness, SOB  PTA, pt was independent w/ all functional mobility w/ no AD/DME, ambulates unrestricted distances and all terrain, has 1-2 TORI, lives w/ family in 1 level home and on disability  Personal factors affecting pt at time of IE include: stairs to enter home, inability to navigate community distances and positive fall history   Please find objective findings from PT assessment regarding body systems outlined above with impairments and limitations including weakness, impaired balance, decreased endurance, gait deviations, pain, decreased activity tolerance, fall risk and SOB upon exertion, as well as mobility assessment (need for SBA assist w/ all phases of mobility when usually ambulating independently)  The following objective measures performed on IE also reveal limitations: Barthel Index: 60/100 and Modified [place]: 3 (moderate disability)  Pt's clinical presentation is currently unstable/unpredictable seen in pt's presentation of abnormal lab value(s), need for input for task focus and mobility technique and ongoing medical assessment  Pt to benefit from continued PT tx to address deficits as defined above and maximize level of functional independent mobility and consistency  From PT/mobility standpoint, recommendation at time of d/c would be Home PT with family support pending progress in order to facilitate return to PLOF  Barriers to Discharge: Inaccessible home environment  Barriers to Discharge Comments: 1-2 TORI  PT Discharge Recommendation: Home with skilled therapy, Return to previous environment with social support(home PT)     PT - OK to Discharge: No    See flowsheet documentation for full assessment

## 2020-08-05 NOTE — PHYSICAL THERAPY NOTE
Physical Therapy Evaluation     Patient's Name: Queen Ebenezer    Admitting Diagnosis  Shortness of breath [R06 02]  Chest pain [R07 9]  Dyspnea [R06 00]  Anemia [D64 9]  GI bleed [K92 2]    Problem List  Patient Active Problem List   Diagnosis    Benign prostatic hyperplasia with nocturia    Anxiety disorder    Hypothyroidism    OAB (overactive bladder)    Symptomatic anemia    Hyponatremia    Muscle spasm    Lower extremity edema    Bruising    Hiatal hernia       Past Medical History  Past Medical History:   Diagnosis Date    Anxiety     Arthritis     Colon polyp     Disease of thyroid gland     Hashimoto thyroiditis, fibrous variant     Irritable bowel syndrome     Muscle weakness     Shortness of breath        Past Surgical History  Past Surgical History:   Procedure Laterality Date    COLONOSCOPY      HERNIA REPAIR  2003    AR CYSTOURETHRO W/IMPLANT N/A 11/7/2019    Procedure: CYSTOSCOPY WITH INSERTION Martell Webb;  Surgeon: Eliecer Garcia MD;  Location: [facility];  Service: Urology          08/05/20 1018   Note Type   Note type Eval only   Pain Assessment   Pain Assessment Tool 0-10   Pain Score 5   Pain Location/Orientation Orientation: Right;Location: Leg;Location: Knee   Pain Onset/Description Frequency: Intermittent; Descriptor: Discomfort; Descriptor: Sore   Home Living   Type of [address] One level;Performs ADLs on one level; Able to live on main level with bedroom/bathroom;Stairs to enter without rails  (1-2 TORI)   Bathroom Shower/Tub Tub/shower unit   Bathroom Toilet Standard   Bathroom Equipment   (no DME per pt)   [address]   (no AD @ baseline per pt)   Prior Function   Level of [place] Independent with ADLs and functional mobility   Lives With [employer] Help From Family   ADL Assistance Independent   IADLs Independent   Falls in the last 6 months 1 to 4  (2)   Vocational On disability   Comments (+) Driving Restrictions/Precautions   Weight Bearing Precautions Per Order No   Other Precautions Chair Alarm; Fall Risk;Pain   General   Family/Caregiver Present No   Cognition   Overall Cognitive Status WFL   Arousal/Participation Alert   Orientation Level Oriented X4   Memory   (defer to OT eval for comments)   Following Commands Follows multistep commands without difficulty   Comments pt agreeable to PT evaluation   RUE Assessment   RUE Assessment   (defer to OT eval for comments)   LUE Assessment   LUE Assessment   (defer to OT eval for comments)   RLE Assessment   RLE Assessment WFL  (grossly assessed c functional mobility: at least 3+/5)   LLE Assessment   LLE Assessment WFL  (grossly assessed c functional mobility: at least 3+/5)   Coordination   Movements are Fluid and Coordinated 1   Sensation [facility]   Light Touch   RLE Light Touch Grossly intact   LLE Light Touch Grossly intact   Bed Mobility   Supine to Sit 5  Supervision   Additional items Assist x 1; Impulsive;Verbal cues   Additional Comments vitals taken OOB in recliner: 118/76mmHg, 99% SpO2 on RA, 101bpm   pt with subjective c/o SOB/VÁSQUEZ, visually appearing to require deep breathes in order to per pt report "to catch my breathe"  Pt refusing further mobility trial at this time 2* SOB  RN Shubham Lee notified of such  Transfers   Sit to Stand 5  Supervision   Additional items Assist x 1;Verbal cues; Impulsive   Stand to Sit 5  Supervision   Additional items Assist x 1;Verbal cues   Ambulation/Elevation   Gait pattern Inconsistent maurilio  (increased velocity  mild lateral sway  No LOB observed)   Gait Assistance 5  Supervision   Additional items Assist x 1   Assistive Device None   Distance 30'  pt refusing further distance trial at this time 2* SOB   SPO2 on RA %   Stair Management Assistance Not tested   Balance   Static Sitting Good   Dynamic Sitting Fair +   Static Standing Fair   Dynamic Standing Fair   Ambulatory Fair   Endurance Deficit   Endurance Deficit Yes   Endurance Deficit Description SOB/VÁSQUEZ   Activity Tolerance   Activity Tolerance Patient limited by fatigue   Medical Staff Made Aware  [facility]   Nurse Made Aware RN Aileen Bryan verbalized pt appropriate to see  PT telephoned Aileen Bryan post eval to notify of session outcome   Assessment   Prognosis Good   Problem List Decreased strength;Decreased endurance; Impaired balance;Decreased mobility;Pain   Assessment Pt is 63 y o  male seen for PT evaluation on 8/5/2020 s/p admit to [facility] on 7/31/2020 w/ Symptomatic anemia  Patient reporting constant shortness of breath x1 month and chest muscle spasm for many years  Reports he was told by his PCP to be evaluated at the ED due to concern of decreasing hemoglobin and hematocrit  Patient admits to a fall out of the shower on 07/08, admitting since then to ecchymosis of right lower extremity near knee, swelling, ttp, no issues walking  PT consulted to assess pt's functional mobility and d/c needs  Order placed for PT eval and tx, w/ up w/ A order  Performed at least 2 patient identifiers during session: Name and wristband  Comorbidities affecting pt's physical performance at time of assessment include: anxiety, arthritis, colon polyp, disease of thyroid gland, muscle weakness, SOB  PTA, pt was independent w/ all functional mobility w/ no AD/DME, ambulates unrestricted distances and all terrain, has 1-2 TORI, lives w/ family in 1 level home and on disability  Personal factors affecting pt at time of IE include: stairs to enter home, inability to navigate community distances and positive fall history   Please find objective findings from PT assessment regarding body systems outlined above with impairments and limitations including weakness, impaired balance, decreased endurance, gait deviations, pain, decreased activity tolerance, fall risk and SOB upon exertion, as well as mobility assessment (need for SBA assist w/ all phases of mobility when usually ambulating independently)  The following objective measures performed on IE also reveal limitations: Barthel Index: 60/100 and Modified Winnie: 3 (moderate disability)  Pt's clinical presentation is currently unstable/unpredictable seen in pt's presentation of abnormal lab value(s), need for input for task focus and mobility technique and ongoing medical assessment  Pt to benefit from continued PT tx to address deficits as defined above and maximize level of functional independent mobility and consistency  From PT/mobility standpoint, recommendation at time of d/c would be Home PT with family support pending progress in order to facilitate return to PLOF  Barriers to Discharge Inaccessible home environment   Barriers to Discharge Comments 1-2 TORI   Goals   Patient Goals "to get back to normal"   [facility] Expiration Date 08/15/20   Short Term Goal #1 In 7-10 days: Increase bilateral LE strength 1/2 grade to facilitate independent mobility, Perform all bed mobility tasks modified independent to decrease caregiver burden, Perform all transfers modified independent to improve independence, Ambulate > 150 ft  with least restrictive assistive device modified independent w/o LOB and w/ normalized gait pattern 100% of the time, Navigate 2 stairs modified independent with unilateral handrail to facilitate return to previous living environment, Increase all balance 1/2 grade to decrease risk for falls, Improve Barthel Index score to 75 or greater to facilitate independence and PT provider will perform functional balance assessment to determine fall risk   PT Treatment Day 0   Plan   Treatment/Interventions Functional transfer training;LE strengthening/ROM; Therapeutic exercise;Elevations; Endurance training;Patient/family training;Bed mobility;Gait training;Spoke to nursing;OT   PT Frequency   (3-5x/wk)   Recommendation   PT Discharge Recommendation Home with skilled therapy; Return to previous environment with social support  (home PT)   Equipment Recommended   (TBD; none anticipated)   PT - OK to Discharge No   Modified Winnie Scale   Modified [place] Scale 3   Barthel Index   Feeding 10   Bathing 0   Grooming Score 5   Dressing Score 10   Bladder Score 10   Bowels Score 10   Toilet Use Score 5   Transfers (Bed/Chair) Score 10   Mobility (Level Surface) Score 0   Stairs Score 0   Barthel Index Score 60         Porter Chan, PT, DPT

## 2020-08-05 NOTE — PLAN OF CARE
Problem: Potential for Falls  Goal: Patient will remain free of falls  Description: INTERVENTIONS:  - Assess patient frequently for physical needs  -  Identify cognitive and physical deficits and behaviors that affect risk of falls    -  [place] fall precautions as indicated by assessment   - Educate patient/family on patient safety including physical limitations  - Instruct patient to call for assistance with activity based on assessment  - Modify environment to reduce risk of injury  - Consider OT/PT consult to assist with strengthening/mobility  Outcome: Progressing     Problem: PAIN - ADULT  Goal: Verbalizes/displays adequate comfort level or baseline comfort level  Description: Interventions:  - Encourage patient to monitor pain and request assistance  - Assess pain using appropriate pain scale  - Administer analgesics based on type and severity of pain and evaluate response  - Implement non-pharmacological measures as appropriate and evaluate response  - Consider cultural and social influences on pain and pain management  - Notify physician/advanced practitioner if interventions unsuccessful or patient reports new pain  Outcome: Progressing     Problem: INFECTION - ADULT  Goal: Absence or prevention of progression during hospitalization  Description: INTERVENTIONS:  - Assess and monitor for signs and symptoms of infection  - Monitor lab/diagnostic results  - Monitor all insertion sites, i e  indwelling lines, tubes, and drains  - Monitor endotracheal if appropriate and nasal secretions for changes in amount and color  - [place] appropriate cooling/warming therapies per order  - Administer medications as ordered  - Instruct and encourage patient and family to use good hand hygiene technique  - Identify and instruct in appropriate isolation precautions for identified infection/condition  Outcome: Progressing     Problem: SAFETY ADULT  Goal: Patient will remain free of falls  Description: INTERVENTIONS:  - Assess patient frequently for physical needs  -  Identify cognitive and physical deficits and behaviors that affect risk of falls    -  [place] fall precautions as indicated by assessment   - Educate patient/family on patient safety including physical limitations  - Instruct patient to call for assistance with activity based on assessment  - Modify environment to reduce risk of injury  - Consider OT/PT consult to assist with strengthening/mobility  Outcome: Progressing  Goal: Maintain or return to baseline ADL function  Description: INTERVENTIONS:  -  Assess patient's ability to carry out ADLs; assess patient's baseline for ADL function and identify physical deficits which impact ability to perform ADLs (bathing, care of mouth/teeth, toileting, grooming, dressing, etc )  - Assess/evaluate cause of self-care deficits   - Assess range of motion  - Assess patient's mobility; develop plan if impaired  - Assess patient's need for assistive devices and provide as appropriate  - Encourage maximum independence but intervene and supervise when necessary  - Involve family in performance of ADLs  - Assess for home care needs following discharge   - Consider OT consult to assist with ADL evaluation and planning for discharge  - Provide patient education as appropriate  Outcome: Progressing  Goal: Maintain or return mobility status to optimal level  Description: INTERVENTIONS:  - Assess patient's baseline mobility status (ambulation, transfers, stairs, etc )    - Identify cognitive and physical deficits and behaviors that affect mobility  - Identify mobility aids required to assist with transfers and/or ambulation (gait belt, sit-to-stand, lift, walker, cane, etc )  - [place] fall precautions as indicated by assessment  - Record patient progress and toleration of activity level on Mobility SBAR; progress patient to next Phase/Stage  - Instruct patient to call for assistance with activity based on assessment  - Consider rehabilitation consult to assist with strengthening/weightbearing, etc   Outcome: Progressing     Problem: DISCHARGE PLANNING  Goal: Discharge to home or other facility with appropriate resources  Description: INTERVENTIONS:  - Identify barriers to discharge w/patient and caregiver  - Arrange for needed discharge resources and transportation as appropriate  - Identify discharge learning needs (meds, wound care, etc )  - Arrange for interpretive services to assist at discharge as needed  - Refer to Case Management Department for coordinating discharge planning if the patient needs post-hospital services based on physician/advanced practitioner order or complex needs related to functional status, cognitive ability, or social support system  Outcome: Progressing     Problem: Knowledge Deficit  Goal: Patient/family/caregiver demonstrates understanding of disease process, treatment plan, medications, and discharge instructions  Description: Complete learning assessment and assess knowledge base    Interventions:  - Provide teaching at level of understanding  - Provide teaching via preferred learning methods  Outcome: Progressing     Problem: HEMATOLOGIC - ADULT  Goal: Maintains hematologic stability  Description: INTERVENTIONS  - Assess for signs and symptoms of bleeding or hemorrhage  - Monitor labs  - Administer supportive blood products/factors as ordered and appropriate  Outcome: Progressing

## 2020-08-05 NOTE — OCCUPATIONAL THERAPY NOTE
Occupational Therapy Evaluation Note      Patient Name: Victoriano VALLEJO Date: 8/5/2020 08/05/20 8924   Note Type   Note type Eval only   Restrictions/Precautions   Weight Bearing Precautions Per Order No   Braces or Orthoses Other (Comment)  (no per patient report )   Other Precautions Chair Alarm; Fall Risk;Pain   Pain Assessment   Pain Assessment Tool 0-10   Pain Score 5   Pain Location/Orientation Orientation: Right;Location: Leg;Location: Knee   Pain Radiating Towards does not radiate    Pain Onset/Description Frequency: Intermittent; Descriptor: Discomfort; Descriptor: Sore   Patient's Stated Pain Goal No pain   Hospital Pain Intervention(s) Repositioned; Ambulation/increased activity   Multiple Pain Sites No   Home Living   Type of [address] One level;Performs ADLs on one level; Able to live on main level with bedroom/bathroom;Stairs to enter without rails  (1-2 TORI)   Bathroom Shower/Tub Tub/shower unit   Bathroom Toilet Standard   Bathroom Equipment Other (Comment)  (no DME)   [address] Other (Comment)  (no AD )   Additional Comments Pt ambulatory without use of AD at baseline    Prior Function   Level of [place] Independent with ADLs and functional mobility   Lives With [employer] Help From Family   ADL Assistance Independent   IADLs Independent   Falls in the last 6 months 1 to 4  (2)   Vocational On disability   Comments pt driving   Lifestyle   Autonomy Pt lives with family in a one-level home with 1-2 TORI  Pt has a tub/shower unit and standard toilet with no DME  At baseline, pt ambulatory without use of AD  Prior to admission, pt was independent in all ADLs and functional mobility  Reciprocal Relationships Supportive Family    Intrinsic Gratification Working on computer; doing yard work    Psychosocial   Psychosocial (WDL) X   Patient Behaviors/Mood Anxious; Fearful;Irritable   Ability to Express Feelings Able to express   Ability to Express Needs Able to express   Ability to Express Thoughts Able to express   Ability to Understand Others Understands   ADL   Where Assessed Chair   Equipment Provided Other (Comment)  (none )   Eating Assistance 5  Supervision/Setup   Eating Deficit Setup;Verbal cueing;Supervision/safety; Increased time to complete   Grooming Assistance 5  Supervision/Setup   Grooming Deficit Setup;Verbal cueing;Supervision/safety; Increased time to complete   UB Bathing Assistance 5  Supervision/Setup   UB Bathing Deficit Setup;Verbal cueing;Supervision/safety; Increased time to complete   LB Bathing Assistance 4  Minimal Assistance   LB Bathing Deficit Setup;Verbal cueing;Supervision/safety; Increased time to complete   UB Dressing Assistance 5  Supervision/Setup   UB Dressing Deficit Setup;Verbal cueing;Supervision/safety; Increased time to complete   LB Dressing Assistance 4  Minimal Assistance   LB Dressing Deficit Setup;Verbal cueing;Supervision/safety; Increased time to complete   Toileting Assistance  4  Minimal Assistance   Toileting Deficit Setup;Verbal cueing;Supervison/safety; Increased time to complete   Functional Assistance 4  Minimal Assistance   Functional Deficit Setup;Verbal cueing;Supervision/safety; Increased time to complete   Bed Mobility   Supine to Sit 5  Supervision   Additional items Assist x 1; Impulsive;Verbal cues   Additional Comments vitals taken OOB in recliner: 118/76mmHg, 99% SpO2 on RA, 101bpm   Transfers   Sit to Stand 5  Supervision   Additional items Assist x 1;Verbal cues; Impulsive   Stand to Sit 5  Supervision   Additional items Assist x 1;Verbal cues   Functional Mobility   Functional Mobility 5  Supervision   Additional Comments Pt functionally ambulated without use of AD, with close contact supervision assist     Additional items   (none )   Balance   Static Sitting Good   Dynamic Sitting Fair +   Static Standing Fair   Dynamic Standing Fair   Activity Tolerance   Activity Tolerance Patient limited by fatigue   Nurse Made Aware RN verbalized pt appropriate for therapy  RN made aware of therapy outcomes  Therapist communicated with CM for recommendation at d/c     RUE Assessment   RUE Assessment WFL   LUE Assessment   LUE Assessment WFL   Hand Function   Gross Motor Coordination Functional   Fine Motor Coordination Functional   Sensation   Light Touch No apparent deficits  (BUEs)   Vision-Basic Assessment   Current Vision Wears glasses only for reading   Visual History Other (Comment)  (none )   Patient Visual Report Other (Comment)  (no changes in vision )   Cognition   Overall Cognitive Status [facility]   Arousal/Participation Alert; Responsive; Cooperative   Attention Attends with cues to redirect   Orientation Level Oriented X4   Memory Within functional limits   Following Commands Follows one step commands without difficulty   Comments Pt alert and oriented to person, place, time, and situation  Therapist administered Short Blessed Test  Pt obtained a score of 2, indicative or normal cogniton  Pt errored on delayed short term recal     Cognition Assessment Tools Other (Comment)  (SBT )   Score 2   Assessment   Limitation Decreased ADL status; Decreased endurance;Decreased self-care trans;Decreased high-level ADLs   Prognosis Good   Assessment Pt is a 63 y o  M admitted to [place] [address] on 7/31/2020 with shortness of breath  Comorbidities limiting pt performance include: GI bleed, lower extremity edema, muscle spasm, hyponatremia, hypothyroidism due to Hashimoto's thyroiditis, and anxiety disorder  OT orders received  Pt chart reviewed  Performed at least two patient identifiers including name and wrist band during session  Prior to admission, pt was independent in ADLs and functional mobility  Pt lives with family in a one-level home with 1-2 TORI  Pt has a tub/shower unit and standard toilet with no DME  At baseline, pt ambulatory without use of AD   Upon evaluation, pt was received supine in bed, alert and oriented to person, place, time, and situation, with s/s of distress  Pt required supervision assist for eating, grooming, and UB ADLs  Pt required min A for LB ADLs, toileting, functional assistance  Pt completed bed mobility and transferred with supervision assist of 1, without use of AD  Deficits limiting pt occupational performance include: decreased dynamic sitting and standing balance and decreased endurance/activity tolerance  Occupational performance areas limited due to deficits mentioned above include: bathing, grooming, dressing, toileting, functional mobility, community mobility, and leisure/work participation  Pt would benefit from continued skilled OT sessions while admitted to the hospital to address the deficits mentioned above and to maximize functional independence  From an OT standpoint, recommendation at d/c would be home with skilled home occupational therapy  Goals   Patient Goals "to get back to normal"    Plan   Treatment Interventions ADL retraining; Endurance training;Patient/family training;Equipment evaluation/education; Compensatory technique education; Energy conservation; Activityengagement   Goal Expiration Date 08/19/20   OT Frequency 2-3x/wk   Recommendation   OT Discharge Recommendation Home with skilled therapy  (Home OT )   Equipment Recommended Other (comment)  (none )   OT - OK to Discharge Yes   Barthel Index   Feeding 10   Bathing 0   Grooming Score 5   Dressing Score 10   Bladder Score 10   Bowels Score 10   Toilet Use Score 5   Transfers (Bed/Chair) Score 10   Mobility (Level Surface) Score 0   Stairs Score 0   Barthel Index Score 60   Modified [place] Scale   Modified [place] Scale 3     Goals:    Patient will demonstrate and verbalize good energy conservation techniques and good safety awareness with s/s of exertion while completing a functional activity with no verbal cues       Patient will engage in a functional dynamic sitting task at [facility] I level to increase dynamic sitting and maximize functional independence in daily tasks  Patient will engage in functional dynamic standing task at mod I level to increase dynamic standing balance and increase engagement in daily activities  Patient will complete UB ADLs while standing at sink for 5 minutes at mod I level to increase functional independence in activities of daily living  Patient will complete LB ADLs while seated EOB/chair at mod I level to increase functional independence in daily activities  Patient will transfer from bed/chair to chair/toilet/shower at [facility] I level to increase functional independence in daily activities

## 2020-08-05 NOTE — PROGRESS NOTES
Progress Note - Cheryle Oliphant 1960, 63 y o  male MRN: [id number]    Unit/Bed#: -01 Encounter: [id number]    Primary Care Provider: Ciarra Goodman MD   Date and time admitted to hospital: 7/31/2020  6:45 PM        Bruising  Assessment & Plan  Patient with excessive poorly healing bruising on the right lower extremity and right upper extremity  He is also presenting with acute anemia  Still working up anemia, but will consult hematology due to excessive bruising  Lower extremity edema  Assessment & Plan  Chronic lower extremities edema  He also had a fall with some bruising on his legs especially the right leg  Venous duplex during this admission was negative for DVT  Muscle spasm  Assessment & Plan  Muscle spasms are chronic  No NSAIDs  Continue Flexeril at bedtime  Check ck and sed rate    Hyponatremia  Assessment & Plan  Patient reports "no food" for the past 2 days  Suspect he may be dehydrated  Resolved with IVF      Hypothyroidism  Assessment & Plan  Continue home dose levothyroxine  No need to change any does specially in acute setting and patient follow-up on outpatient basis with repeat TSH and any further adjustments in his replacement    Anxiety disorder  Assessment & Plan  Known history of anxiety and patient presents with anxious affect  Continue with home medications, such as Effexor and BuSpar  Avoid benzodiazepines  * Symptomatic anemia  Assessment & Plan  Iron deficiency anemia  EGD did not reveal any source of bleeding  colonoscopy on 08/04/2020 with diverticulosis  Continue with IV iron  needs iron supplement on discharge  Heme consult pending with low wbc and hgh  hgb stable      VTE Pharmacologic Prophylaxis:   Pharmacologic: Pharmacologic VTE Prophylaxis contraindicated due to nameia  Mechanical VTE Prophylaxis in Place: Yes    Patient Centered Rounds: I have performed bedside rounds with nursing staff today      Discussions with Specialists or Other Care Team Provider:     Education and Discussions with Family / Patient:     Time Spent for Care: 30 minutes  More than 50% of total time spent on counseling and coordination of care as described above  Current Length of Stay: 5 day(s)    Current Patient Status: Inpatient   Certification Statement: The patient will continue to require additional inpatient hospital stay due to anemia, symptomatic    Discharge Plan: home    Code Status: Level 3 - DNAR and DNI      Subjective:   Denies chest pain or shortness of breath  Objective:     Vitals:   Temp (24hrs), Av 2 °F (36 8 °C), Min:97 6 °F (36 4 °C), Max:98 6 °F (37 °C)    Temp:  [97 6 °F (36 4 °C)-98 6 °F (37 °C)] 98 4 °F (36 9 °C)  HR:  [71-86] 86  Resp:  [12-18] 18  BP: (114-128)/(61-78) 114/62  SpO2:  [99 %-100 %] 99 %  Body mass index is 35 43 kg/m²  Input and Output Summary (last 24 hours): Intake/Output Summary (Last 24 hours) at 2020 0741  Last data filed at 2020 0100  Gross per 24 hour   Intake 780 ml   Output 650 ml   Net 130 ml       Physical Exam:     Physical Exam   Constitutional: He is oriented to person, place, and time  He appears well-developed and well-nourished  HENT:   Head: Normocephalic and atraumatic  Eyes: Pupils are equal, round, and reactive to light  Neck: Neck supple  Cardiovascular: Normal rate, regular rhythm, S1 normal and S2 normal  Pulses are palpable  Pulmonary/Chest: Effort normal and breath sounds normal    Abdominal: Soft  Bowel sounds are normal    Musculoskeletal:         General: Edema present  Neurological: He is alert and oriented to person, place, and time  Skin: Skin is warm and dry  Psychiatric: He has a normal mood and affect   His behavior is normal        Additional Data:     Labs:    Results from last 7 days   Lab Units 20  0525   WBC Thousand/uL 3 89*   HEMOGLOBIN g/dL 7 7*   HEMATOCRIT % 24 2*   PLATELETS Thousands/uL 189   NEUTROS PCT % 72   LYMPHS PCT % 17   MONOS PCT % 7   EOS PCT % 2     Results from last 7 days   Lab Units 08/05/20  0525  08/02/20  0443   SODIUM mmol/L 135*   < > 134*   POTASSIUM mmol/L 3 7   < > 3 6   CHLORIDE mmol/L 101   < > 101   CO2 mmol/L 26   < > 26   BUN mg/dL 11   < > 17   CREATININE mg/dL 0 84   < > 0 92   ANION GAP mmol/L 8   < > 7   CALCIUM mg/dL 7 9*   < > 7 7*   ALBUMIN g/dL  --   --  2 8*   TOTAL BILIRUBIN mg/dL  --   --  1 30*   ALK PHOS U/L  --   --  72   ALT U/L  --   --  18   AST U/L  --   --  20   GLUCOSE RANDOM mg/dL 94   < > 107    < > = values in this interval not displayed  Results from last 7 days   Lab Units 07/31/20  1949   INR  1 17                       * I Have Reviewed All Lab Data Listed Above  * Additional Pertinent Lab Tests Reviewed: All Labs Within Last 24 Hours Reviewed    Imaging:    Imaging Reports Reviewed Today Include:   Imaging Personally Reviewed by Myself Includes:      Recent Cultures (last 7 days):           Last 24 Hours Medication List:   acetaminophen, 650 mg, Oral, Q6H PRN, Isrrael Carlisle MD  aluminum-magnesium hydroxide-simethicone, 30 mL, Oral, Q6H PRN, Isrrael Carlisle MD  busPIRone, 10 mg, Oral, TID, Isrrael Carlisle MD  cyclobenzaprine, 10 mg, Oral, HS, Rui Chew III, MD  docusate sodium, 100 mg, Oral, BID PRN, Isrrael Carlisle MD  ferrous sulfate, 325 mg, Oral, Daily With Breakfast, Isrrael Carlisle MD  levothyroxine, 75 mcg, Oral, Early Morning, Isrrael Carlisle MD  ondansetron, 4 mg, Intravenous, Q6H PRN, Isrrael Carlisle MD  pantoprazole, 40 mg, Oral, Early Morning, Isrrael Carlisle MD  venlafaxine, 150 mg, Oral, Daily, Isrrael Carlisle MD         Today, Patient Was Seen By: Anmol Correa MD    ** Please Note: Dictation voice to text software may have been used in the creation of this document   **

## 2020-08-06 ENCOUNTER — TELEPHONE (OUTPATIENT)
Dept: GASTROENTEROLOGY | Facility: CLINIC | Age: 60
End: 2020-08-06

## 2020-08-06 LAB
ALBUMIN SERPL BCP-MCNC: 3 G/DL (ref 3.5–5)
ALP SERPL-CCNC: 82 U/L (ref 46–116)
ALT SERPL W P-5'-P-CCNC: 25 U/L (ref 12–78)
ANION GAP SERPL CALCULATED.3IONS-SCNC: 7 MMOL/L (ref 4–13)
AST SERPL W P-5'-P-CCNC: 22 U/L (ref 5–45)
BASOPHILS # BLD AUTO: 0.01 THOUSANDS/ΜL (ref 0–0.1)
BASOPHILS NFR BLD AUTO: 0 % (ref 0–1)
BILIRUB SERPL-MCNC: 1 MG/DL (ref 0.2–1)
BLD SMEAR INTERP: NORMAL
BUN SERPL-MCNC: 13 MG/DL (ref 5–25)
CALCIUM SERPL-MCNC: 8.4 MG/DL (ref 8.3–10.1)
CHLORIDE SERPL-SCNC: 102 MMOL/L (ref 100–108)
CO2 SERPL-SCNC: 27 MMOL/L (ref 21–32)
CREAT SERPL-MCNC: 0.93 MG/DL (ref 0.6–1.3)
CRP SERPL QL: 47.5 MG/L
EOSINOPHIL # BLD AUTO: 0.04 THOUSAND/ΜL (ref 0–0.61)
EOSINOPHIL NFR BLD AUTO: 1 % (ref 0–6)
ERYTHROCYTE [DISTWIDTH] IN BLOOD BY AUTOMATED COUNT: 15.1 % (ref 11.6–15.1)
GFR SERPL CREATININE-BSD FRML MDRD: 88 ML/MIN/1.73SQ M
GLUCOSE SERPL-MCNC: 69 MG/DL (ref 65–140)
HCT VFR BLD AUTO: 26 % (ref 36.5–49.3)
HGB BLD-MCNC: 8.1 G/DL (ref 12–17)
IMM GRANULOCYTES # BLD AUTO: 0.05 THOUSAND/UL (ref 0–0.2)
IMM GRANULOCYTES NFR BLD AUTO: 1 % (ref 0–2)
LDH SERPL-CCNC: 306 U/L (ref 81–234)
LYMPHOCYTES # BLD AUTO: 0.59 THOUSANDS/ΜL (ref 0.6–4.47)
LYMPHOCYTES NFR BLD AUTO: 15 % (ref 14–44)
MCH RBC QN AUTO: 30.3 PG (ref 26.8–34.3)
MCHC RBC AUTO-ENTMCNC: 31.2 G/DL (ref 31.4–37.4)
MCV RBC AUTO: 97 FL (ref 82–98)
MONOCYTES # BLD AUTO: 0.27 THOUSAND/ΜL (ref 0.17–1.22)
MONOCYTES NFR BLD AUTO: 7 % (ref 4–12)
NEUTROPHILS # BLD AUTO: 2.92 THOUSANDS/ΜL (ref 1.85–7.62)
NEUTS SEG NFR BLD AUTO: 76 % (ref 43–75)
NRBC BLD AUTO-RTO: 0 /100 WBCS
PLATELET # BLD AUTO: 221 THOUSANDS/UL (ref 149–390)
PMV BLD AUTO: 9.6 FL (ref 8.9–12.7)
POTASSIUM SERPL-SCNC: 3.9 MMOL/L (ref 3.5–5.3)
PROT SERPL-MCNC: 6.2 G/DL (ref 6.4–8.2)
RBC # BLD AUTO: 2.67 MILLION/UL (ref 3.88–5.62)
RETICS # AUTO: ABNORMAL 10*3/UL (ref 14356–105094)
RETICS # CALC: 8.26 % (ref 0.37–1.87)
SODIUM SERPL-SCNC: 136 MMOL/L (ref 136–145)
WBC # BLD AUTO: 3.88 THOUSAND/UL (ref 4.31–10.16)

## 2020-08-06 PROCEDURE — 99232 SBSQ HOSP IP/OBS MODERATE 35: CPT | Performed by: GENERAL PRACTICE

## 2020-08-06 PROCEDURE — 99222 1ST HOSP IP/OBS MODERATE 55: CPT | Performed by: PHYSICIAN ASSISTANT

## 2020-08-06 PROCEDURE — 85045 AUTOMATED RETICULOCYTE COUNT: CPT | Performed by: PHYSICIAN ASSISTANT

## 2020-08-06 PROCEDURE — 84165 PROTEIN E-PHORESIS SERUM: CPT | Performed by: GENERAL PRACTICE

## 2020-08-06 PROCEDURE — 84165 PROTEIN E-PHORESIS SERUM: CPT | Performed by: PATHOLOGY

## 2020-08-06 PROCEDURE — 83010 ASSAY OF HAPTOGLOBIN QUANT: CPT | Performed by: GENERAL PRACTICE

## 2020-08-06 PROCEDURE — 85025 COMPLETE CBC W/AUTO DIFF WBC: CPT | Performed by: GENERAL PRACTICE

## 2020-08-06 PROCEDURE — 80053 COMPREHEN METABOLIC PANEL: CPT | Performed by: GENERAL PRACTICE

## 2020-08-06 RX ADMIN — FERROUS SULFATE TAB 325 MG (65 MG ELEMENTAL FE) 325 MG: 325 (65 FE) TAB at 08:33

## 2020-08-06 RX ADMIN — BUSPIRONE HYDROCHLORIDE 10 MG: 5 TABLET ORAL at 08:33

## 2020-08-06 RX ADMIN — LEVOTHYROXINE SODIUM 75 MCG: 75 TABLET ORAL at 05:49

## 2020-08-06 RX ADMIN — VENLAFAXINE HYDROCHLORIDE 150 MG: 150 CAPSULE, EXTENDED RELEASE ORAL at 09:13

## 2020-08-06 RX ADMIN — BUSPIRONE HYDROCHLORIDE 10 MG: 5 TABLET ORAL at 16:08

## 2020-08-06 RX ADMIN — BUSPIRONE HYDROCHLORIDE 10 MG: 5 TABLET ORAL at 22:46

## 2020-08-06 RX ADMIN — PANTOPRAZOLE SODIUM 40 MG: 40 TABLET, DELAYED RELEASE ORAL at 05:49

## 2020-08-06 NOTE — PLAN OF CARE
Problem: Potential for Falls  Goal: Patient will remain free of falls  Description: INTERVENTIONS:  - Assess patient frequently for physical needs  -  Identify cognitive and physical deficits and behaviors that affect risk of falls    -  [place] fall precautions as indicated by assessment   - Educate patient/family on patient safety including physical limitations  - Instruct patient to call for assistance with activity based on assessment  - Modify environment to reduce risk of injury  - Consider OT/PT consult to assist with strengthening/mobility  8/6/2020 0948 by Ariadna Hampton RN  Outcome: Progressing  8/6/2020 0948 by Ariadna Hampton RN  Outcome: Progressing     Problem: PAIN - ADULT  Goal: Verbalizes/displays adequate comfort level or baseline comfort level  Description: Interventions:  - Encourage patient to monitor pain and request assistance  - Assess pain using appropriate pain scale  - Administer analgesics based on type and severity of pain and evaluate response  - Implement non-pharmacological measures as appropriate and evaluate response  - Consider cultural and social influences on pain and pain management  - Notify physician/advanced practitioner if interventions unsuccessful or patient reports new pain  8/6/2020 0948 by Ariadna Hampton RN  Outcome: Progressing  8/6/2020 0948 by Ariadna Hampton RN  Outcome: Progressing     Problem: INFECTION - ADULT  Goal: Absence or prevention of progression during hospitalization  Description: INTERVENTIONS:  - Assess and monitor for signs and symptoms of infection  - Monitor lab/diagnostic results  - Monitor all insertion sites, i e  indwelling lines, tubes, and drains  - Monitor endotracheal if appropriate and nasal secretions for changes in amount and color  - [place] appropriate cooling/warming therapies per order  - Administer medications as ordered  - Instruct and encourage patient and family to use good hand hygiene technique  - Identify and instruct in appropriate isolation precautions for identified infection/condition  8/6/2020 0948 by Jacob Ventura RN  Outcome: Progressing  8/6/2020 0948 by Jacob Vetnura RN  Outcome: Progressing     Problem: SAFETY ADULT  Goal: Patient will remain free of falls  Description: INTERVENTIONS:  - Assess patient frequently for physical needs  -  Identify cognitive and physical deficits and behaviors that affect risk of falls    -  [place] fall precautions as indicated by assessment   - Educate patient/family on patient safety including physical limitations  - Instruct patient to call for assistance with activity based on assessment  - Modify environment to reduce risk of injury  - Consider OT/PT consult to assist with strengthening/mobility  8/6/2020 0948 by Jacob Ventura RN  Outcome: Progressing  8/6/2020 0948 by Jacob Ventura RN  Outcome: Progressing  Goal: Maintain or return to baseline ADL function  Description: INTERVENTIONS:  -  Assess patient's ability to carry out ADLs; assess patient's baseline for ADL function and identify physical deficits which impact ability to perform ADLs (bathing, care of mouth/teeth, toileting, grooming, dressing, etc )  - Assess/evaluate cause of self-care deficits   - Assess range of motion  - Assess patient's mobility; develop plan if impaired  - Assess patient's need for assistive devices and provide as appropriate  - Encourage maximum independence but intervene and supervise when necessary  - Involve family in performance of ADLs  - Assess for home care needs following discharge   - Consider OT consult to assist with ADL evaluation and planning for discharge  - Provide patient education as appropriate  8/6/2020 0948 by Jacob Ventura RN  Outcome: Progressing  8/6/2020 0948 by Jacob Ventura RN  Outcome: Progressing  Goal: Maintain or return mobility status to optimal level  Description: INTERVENTIONS:  - Assess patient's baseline mobility status (ambulation, transfers, stairs, etc ) - Identify cognitive and physical deficits and behaviors that affect mobility  - Identify mobility aids required to assist with transfers and/or ambulation (gait belt, sit-to-stand, lift, walker, cane, etc )  - [place] fall precautions as indicated by assessment  - Record patient progress and toleration of activity level on Mobility SBAR; progress patient to next Phase/Stage  - Instruct patient to call for assistance with activity based on assessment  - Consider rehabilitation consult to assist with strengthening/weightbearing, etc   8/6/2020 0948 by Isela Chow RN  Outcome: Annie Purvis  8/6/2020 0948 by Isela Chow RN  Outcome: Progressing     Problem: DISCHARGE PLANNING  Goal: Discharge to home or other facility with appropriate resources  Description: INTERVENTIONS:  - Identify barriers to discharge w/patient and caregiver  - Arrange for needed discharge resources and transportation as appropriate  - Identify discharge learning needs (meds, wound care, etc )  - Arrange for interpretive services to assist at discharge as needed  - Refer to Case Management Department for coordinating discharge planning if the patient needs post-hospital services based on physician/advanced practitioner order or complex needs related to functional status, cognitive ability, or social support system  8/6/2020 0948 by Isela Chow RN  Outcome: Progressing  8/6/2020 0948 by Isela Chow RN  Outcome: Progressing     Problem: Knowledge Deficit  Goal: Patient/family/caregiver demonstrates understanding of disease process, treatment plan, medications, and discharge instructions  Description: Complete learning assessment and assess knowledge base    Interventions:  - Provide teaching at level of understanding  - Provide teaching via preferred learning methods  8/6/2020 0948 by Isela Chow RN  Outcome: Progressing  8/6/2020 0948 by Isela Chow RN  Outcome: Progressing     Problem: HEMATOLOGIC - ADULT  Goal: Maintains hematologic stability  Description: INTERVENTIONS  - Assess for signs and symptoms of bleeding or hemorrhage  - Monitor labs  - Administer supportive blood products/factors as ordered and appropriate  8/6/2020 0948 by Tessa Mendez RN  Outcome: Progressing  8/6/2020 0948 by Tessa Mendez RN  Outcome: Progressing

## 2020-08-06 NOTE — CONSULTS
Medical Oncology/Hematology Consult Note  Petey Velez, 63 y o , 1960  /-[birth year], [id number]  08/06/20    Assessment and Plan:    1  Normocytic anemia are on admission  Iron saturation 13% and ferritin of 122 (likely artificial high due to inflammation as evidenced by elevated D-dimer )  Normal FOBT+ no evidence of bleeding on Colonoscopy and Endoscopy  Other lab tests such as LDH, haptoglobin, and hemolyosis smear are needed, as bilirubin was elevating during his hospitalization early on  SPEP and UPEP have been ordered, I believe that this is a good idea, I will also add free light chain ratio  It is possible that the patient is iron deficient, and lasts ferritin is falsely elevated secondary to underlying inflammation within the body and elevated retic count could make the blood appear normocytic as opposed to microcytic as typically seen in ANA  I will await for other blood tests to return  Is there is no evidence of hemolysis/monoclonal gammopathy than supplementation with IV Venofer would be recommended for an additional 3 doses  Furthermore, would recommend the patient follow-up with capsule endoscopy is not pacer and that he have close follow-up with primary care physician regarding hemoglobin evaluation  Note:  I called the lab regarding the blood tests that have been ordered earlier this morning  Blood tube had not been received by the lab  I contacted the floor nurse to have her investigate  If blood could not be found, obviously this will need to be redrawn  Please do not hesitate to contact me if you have any questions or need additional information  Thank you for this consult   ____________________________________________________________________    Reason for Consultation: Anemia, easy brusing    Chief Complaint   Patient presents with    Shortness of Breath     Patient presents to ED with SOB x 1 month and weakness   Patient reports abnormal labs "my hgb, hct and RBC are low " PCP instructed to come to ED  History of present illness: This is a 50-year-old man presented to [facility] secondary to shortness of breath for one month and weakness with abnormal labs detail going anemia  In the emergency room, the patient was found to have a Hemoccult negative, however considering anemia, GI was consulted and the patient underwent an EGD and colonoscopy at the not demonstrate etiology of potential GI bleed  Patient was also found to have a right lower extremity swelling, evaluation did not demonstrate the prone thrombosis  Blood work done to date include iron panel demonstrated iron saturation of 13% with a normal ferritin 122, normal B12, normal folate  Other blood tests are pending including a more current CMP for evaluation of bilirubin, SPEP and UPEP were also ordered and are pending  Interval history:  Patient has autism and is very detail oriented  He was able to provide me with CBC from September 2019 that demonstrated a robust in normal hemoglobin at 14 grams/deciliter  Patient states that he has had issues with his white blood cell count being low  However based upon my conversation I do not believe that the patient was neutropenic  Patient has never had a problem with his hemoglobin in the past   Patient previously was using a lot of NSAIDs  As mentioned above, EGD was negative  Patient has history of three years of deterioration   Patient is no longer able to mow his lawn without needing a break  Review of Systems   Constitutional: Positive for fatigue  Negative for activity change, appetite change and fever  HENT: Negative for nosebleeds  Respiratory: Negative for cough, choking and shortness of breath  Cardiovascular: Negative for chest pain, palpitations and leg swelling  Gastrointestinal: Negative for abdominal distention, abdominal pain, anal bleeding, blood in stool, constipation, diarrhea, nausea and vomiting  Endocrine: Negative for cold intolerance  Genitourinary: Negative for hematuria  Musculoskeletal: Negative for myalgias  Skin: Negative for color change, pallor and rash  Allergic/Immunologic: Negative for immunocompromised state  Neurological: Negative for headaches  Hematological: Negative for adenopathy  Does not bruise/bleed easily  All other systems reviewed and are negative  Past medical history:   Past Medical History:   Diagnosis Date    Anxiety     Arthritis     Colon polyp     Disease of thyroid gland     Hashimoto thyroiditis, fibrous variant     Irritable bowel syndrome     Muscle weakness     Shortness of breath      Past surgical history:   Past Surgical History:   Procedure Laterality Date    COLONOSCOPY      HERNIA REPAIR  2003    DC CYSTOURETHRO W/IMPLANT N/A 11/7/2019    Procedure: CYSTOSCOPY WITH INSERTION UROLIFT;  Surgeon: Nicolas German MD;  Location: [facility];  Service: Urology     Allergies:    Allergies   Allergen Reactions    Aspirin      Stomach pain      Halobetasol Hallucinations    Olanzapine Hallucinations     Home medications:   Medications Prior to Admission   Medication    busPIRone (BUSPAR) 10 mg tablet    cyclobenzaprine (FLEXERIL) 10 mg tablet    Levothyroxine Sodium (TIROSINT) 75 MCG CAPS    omeprazole (PriLOSEC) 20 mg delayed release capsule    venlafaxine (EFFEXOR-XR) 150 mg 24 hr capsule    docusate sodium (COLACE) 100 mg capsule    ibuprofen (MOTRIN) 600 mg tablet    naproxen (EC NAPROSYN) 500 MG EC tablet     Hospital medications:   Current Facility-Administered Medications:     acetaminophen (TYLENOL) tablet 650 mg, 650 mg, Oral, Q6H PRN, Joshua Clark MD    aluminum-magnesium hydroxide-simethicone (MYLANTA) 200-200-20 mg/5 mL oral suspension 30 mL, 30 mL, Oral, Q6H PRN, Joshua Clark MD    busPIRone (BUSPAR) tablet 10 mg, 10 mg, Oral, TID, Maylin Massey III, MD, 10 mg at 08/06/20 0628   cyclobenzaprine (FLEXERIL) tablet 10 mg, 10 mg, Oral, HS, Maine Harris III, MD, 10 mg at 20 2134    docusate sodium (COLACE) capsule 100 mg, 100 mg, Oral, BID PRN, Carol Ann Aguilar MD    ferrous sulfate tablet 325 mg, 325 mg, Oral, Daily With Breakfast, Maine Harris III, MD, 325 mg at 20 3434    levothyroxine tablet 75 mcg, 75 mcg, Oral, Early Morning, Maine Harris III, MD, 75 mcg at 20 0549    ondansetron (ZOFRAN) injection 4 mg, 4 mg, Intravenous, Q6H PRN, Carol Ann Aguilar MD    pantoprazole (PROTONIX) EC tablet 40 mg, 40 mg, Oral, Early Morning, Maine Harris III, MD, 40 mg at 20 0549    venlafaxine (EFFEXOR-XR) 24 hr capsule 150 mg, 150 mg, Oral, Daily, Maine Harris III, MD, 150 mg at 20 1156    Social history:   Social History     Tobacco Use    Smoking status: Former Smoker     Last attempt to quit:      Years since quittin 6    Smokeless tobacco: Never Used   Substance Use Topics    Alcohol use: Never     Frequency: Never    Drug use: Never     Family history: History reviewed  No pertinent family history  Vitals:  Vitals:    20 1538   BP: 117/73   Pulse: 97   Resp: 16   Temp: 98 8 °F (37 1 °C)   SpO2: 100%     Physical Exam  Constitutional:       General: He is not in acute distress  Appearance: He is well-developed  HENT:      Head: Normocephalic and atraumatic  Eyes:      General: No scleral icterus  Extraocular Movements: EOM normal       Pupils: Pupils are equal, round, and reactive to light  Cardiovascular:      Rate and Rhythm: Normal rate and regular rhythm  Pulmonary:      Effort: Pulmonary effort is normal  No respiratory distress  Breath sounds: Normal breath sounds  Genitourinary:     Rectum: Guaiac result negative  Musculoskeletal:         General: No edema  Skin:     Comments: Several ecchymotic lesions in various stages evolution    Patient has some petechial lesion is at his sock line near heavy bruising  No petechiae on the patient's foot bilaterally  Neurological:      Mental Status: He is alert and oriented to person, place, and time  Psychiatric:         Judgment: Judgment normal          Labs and Pertinent Reports Reviewed  Lab Results   Component Value Date    IRON 41 (L) 08/01/2020    TIBC 309 08/01/2020    FERRITIN 122 08/01/2020     Lab Results   Component Value Date    WBC 3 89 (L) 08/05/2020    HGB 7 7 (L) 08/05/2020    HCT 24 2 (L) 08/05/2020    MCV 94 08/05/2020     08/05/2020     No results found for: FDKGPGWA52  No results found for: FOLATE  Lab Results   Component Value Date    QAC6WAVWEFOM 4 913 (H) 07/31/2020     No results found for: RETICCTPCT  No results found for: SPEP, UPEP    Component      Latest Ref Rng & Units 7/31/2020          Admission   WBC      4 31 - 10 16 Thousand/uL 5 93   Red Blood Cell Count      3 88 - 5 62 Million/uL 2 85 (L)   Hemoglobin      12 0 - 17 0 g/dL 8 6 (L)   HCT      36 5 - 49 3 % 26 0 (L)   MCV      82 - 98 fL 91   MCH      26 8 - 34 3 pg 30 2   MCHC      31 4 - 37 4 g/dL 33 1   RDW      11 6 - 15 1 % 12 7   MPV      8 9 - 12 7 fL 8 8 (L)   Platelet Count      361 - 390 Thousands/uL 196   nRBC      /100 WBCs 0   Neutrophils %      43 - 75 % 73   Immat GRANS %      0 - 2 % 1   Lymphocytes Relative      14 - 44 % 16   Monocytes Relative      4 - 12 % 8   Eosinophils      0 - 6 % 2   Basophils Relative      0 - 1 % 0   Absolute Neutrophils      1 85 - 7 62 Thousands/µL 4 37   Immature Grans Absolute      0 00 - 0 20 Thousand/uL 0 04   Lymphocytes Absolute      0 60 - 4 47 Thousands/µL 0 94   Absolute Monocytes      0 17 - 1 22 Thousand/µL 0 46   Absolute Eosinophils      0 00 - 0 61 Thousand/µL 0 10   Basophils Absolute      0 00 - 0 10 Thousands/µL 0 02   Please note: This report has been generated by voice recognition software system  Therefore, there may be syntax, spelling and/or grammatical errors    Please call if you have any questions

## 2020-08-06 NOTE — TELEPHONE ENCOUNTER
----- Message from Alvaro Alanis MD sent at 8/6/2020 12:22 PM EDT -----  Please tell him the polyps were precancerous; please make sure he is scheduled for a video capsule endoscopy

## 2020-08-06 NOTE — PROGRESS NOTES
Progress Note - Mckenna Payne 1960, 63 y o  male MRN: [id number]    Unit/Bed#: -01 Encounter: [id number]    Primary Care Provider: Claude Mota MD   Date and time admitted to hospital: 7/31/2020  6:45 PM        Bruising  Assessment & Plan  Patient with excessive poorly healing bruising on the right lower extremity and right upper extremity  He is also presenting with acute anemia  Still working up anemia, but will consult hematology due to excessive bruising  Lower extremity edema  Assessment & Plan  Chronic lower extremities edema  He also had a fall with some bruising on his legs especially the right leg  Venous duplex during this admission was negative for DVT  Muscle spasm  Assessment & Plan  Muscle spasms are chronic  No NSAIDs  Continue Flexeril at bedtime  Check ck and sed rate-normal    Hyponatremia  Assessment & Plan  Patient reports "no food" for the past 2 days  Suspect he may be dehydrated  Resolved with IVF      Hypothyroidism  Assessment & Plan  Continue home dose levothyroxine  No need to change any does specially in acute setting and patient follow-up on outpatient basis with repeat TSH and any further adjustments in his replacement    Anxiety disorder  Assessment & Plan  Known history of anxiety and patient presents with anxious affect  Continue with home medications, such as Effexor and BuSpar  Avoid benzodiazepines  * Symptomatic anemia  Assessment & Plan  Iron deficiency anemia  EGD did not reveal any source of bleeding  colonoscopy on 08/04/2020 with diverticulosis  Continue with IV iron  needs iron supplement on discharge  Heme consult pending with low wbc and hgb  hgb stable        VTE Pharmacologic Prophylaxis:   Pharmacologic: Pharmacologic VTE Prophylaxis contraindicated due to anemia  Mechanical VTE Prophylaxis in Place: Yes    Patient Centered Rounds: I have performed bedside rounds with nursing staff today      Discussions with Specialists or Other Care Team Provider:     Education and Discussions with Family / Patient:     Time Spent for Care: 30 minutes  More than 50% of total time spent on counseling and coordination of care as described above  Current Length of Stay: 6 day(s)    Current Patient Status: Inpatient   Certification Statement: The patient will continue to require additional inpatient hospital stay due to heme consult    Discharge Plan: home with PT    Code Status: Level 3 - DNAR and DNI      Subjective:   No c/oawaiting heme consult    Objective:     Vitals:   Temp (24hrs), Av 8 °F (37 1 °C), Min:98 8 °F (37 1 °C), Max:98 8 °F (37 1 °C)    Temp:  [98 8 °F (37 1 °C)] 98 8 °F (37 1 °C)  HR:  [97] 97  Resp:  [16] 16  BP: (117)/(73) 117/73  SpO2:  [100 %] 100 %  Body mass index is 35 43 kg/m²  Input and Output Summary (last 24 hours): Intake/Output Summary (Last 24 hours) at 2020 0845  Last data filed at 2020 0550  Gross per 24 hour   Intake 830 ml   Output 1325 ml   Net -495 ml       Physical Exam:     Physical Exam   Constitutional: He is oriented to person, place, and time  He appears well-developed and well-nourished  HENT:   Head: Normocephalic and atraumatic  Eyes: Pupils are equal, round, and reactive to light  Neck: Neck supple  Cardiovascular: Normal rate and regular rhythm  Pulmonary/Chest: Effort normal and breath sounds normal    Abdominal: Soft  Bowel sounds are normal    Musculoskeletal:         General: Edema present  Neurological: He is alert and oriented to person, place, and time  No cranial nerve deficit  Skin: Skin is warm and dry  Psychiatric: He has a normal mood and affect   His behavior is normal          Additional Data:     Labs:    Results from last 7 days   Lab Units 20  0525   WBC Thousand/uL 3 89*   HEMOGLOBIN g/dL 7 7*   HEMATOCRIT % 24 2*   PLATELETS Thousands/uL 189   NEUTROS PCT % 72   LYMPHS PCT % 17   MONOS PCT % 7   EOS PCT % 2     Results from last 7 days   Lab Units 08/05/20  0525  08/02/20  0443   SODIUM mmol/L 135*   < > 134*   POTASSIUM mmol/L 3 7   < > 3 6   CHLORIDE mmol/L 101   < > 101   CO2 mmol/L 26   < > 26   BUN mg/dL 11   < > 17   CREATININE mg/dL 0 84   < > 0 92   ANION GAP mmol/L 8   < > 7   CALCIUM mg/dL 7 9*   < > 7 7*   ALBUMIN g/dL  --   --  2 8*   TOTAL BILIRUBIN mg/dL  --   --  1 30*   ALK PHOS U/L  --   --  72   ALT U/L  --   --  18   AST U/L  --   --  20   GLUCOSE RANDOM mg/dL 94   < > 107    < > = values in this interval not displayed  Results from last 7 days   Lab Units 07/31/20  1949   INR  1 17                       * I Have Reviewed All Lab Data Listed Above  * Additional Pertinent Lab Tests Reviewed: All Labs Within Last 24 Hours Reviewed    Imaging:    Imaging Reports Reviewed Today Include:   Imaging Personally Reviewed by Myself Includes:      Recent Cultures (last 7 days):           Last 24 Hours Medication List:   acetaminophen, 650 mg, Oral, Q6H PRN, Celsa Pacheco MD  aluminum-magnesium hydroxide-simethicone, 30 mL, Oral, Q6H PRN, Celsa Pacheco MD  busPIRone, 10 mg, Oral, TID, Celsa Pacheco MD  cyclobenzaprine, 10 mg, Oral, HS, Neo Mackenzie III, MD  docusate sodium, 100 mg, Oral, BID PRN, Celsa Pacheco MD  ferrous sulfate, 325 mg, Oral, Daily With Breakfast, Celsa Pacheco MD  levothyroxine, 75 mcg, Oral, Early Morning, Celsa Pacheco MD  ondansetron, 4 mg, Intravenous, Q6H PRN, Celsa Pacheco MD  pantoprazole, 40 mg, Oral, Early Morning, Celsa Pacheco MD  venlafaxine, 150 mg, Oral, Daily, Celsa Pacheco MD         Today, Patient Was Seen By: Tracy Harris MD    ** Please Note: Dictation voice to text software may have been used in the creation of this document   **

## 2020-08-06 NOTE — ASSESSMENT & PLAN NOTE
Iron deficiency anemia  EGD did not reveal any source of bleeding  colonoscopy on 08/04/2020 with diverticulosis  Continue with IV iron  needs iron supplement on discharge    Heme consult pending with low wbc and hgb  hgb stable  Ordered ldh, crp, spep, upep, haptoglobin peripheral smear

## 2020-08-06 NOTE — PLAN OF CARE
Problem: Potential for Falls  Goal: Patient will remain free of falls  Description: INTERVENTIONS:  - Assess patient frequently for physical needs  -  Identify cognitive and physical deficits and behaviors that affect risk of falls  -  [place] fall precautions as indicated by assessment   - Educate patient/family on patient safety including physical limitations  - Instruct patient to call for assistance with activity based on assessment  - Modify environment to reduce risk of injury  - Consider OT/PT consult to assist with strengthening/mobility  Outcome: Progressing     Problem: Potential for Falls  Goal: Patient will remain free of falls  Description: INTERVENTIONS:  - Assess patient frequently for physical needs  -  Identify cognitive and physical deficits and behaviors that affect risk of falls    -  [place] fall precautions as indicated by assessment   - Educate patient/family on patient safety including physical limitations  - Instruct patient to call for assistance with activity based on assessment  - Modify environment to reduce risk of injury  - Consider OT/PT consult to assist with strengthening/mobility  Outcome: Progressing     Problem: PAIN - ADULT  Goal: Verbalizes/displays adequate comfort level or baseline comfort level  Description: Interventions:  - Encourage patient to monitor pain and request assistance  - Assess pain using appropriate pain scale  - Administer analgesics based on type and severity of pain and evaluate response  - Implement non-pharmacological measures as appropriate and evaluate response  - Consider cultural and social influences on pain and pain management  - Notify physician/advanced practitioner if interventions unsuccessful or patient reports new pain  Outcome: Progressing     Problem: INFECTION - ADULT  Goal: Absence or prevention of progression during hospitalization  Description: INTERVENTIONS:  - Assess and monitor for signs and symptoms of infection  - Monitor lab/diagnostic results  - Monitor all insertion sites, i e  indwelling lines, tubes, and drains  - Monitor endotracheal if appropriate and nasal secretions for changes in amount and color  - [place] appropriate cooling/warming therapies per order  - Administer medications as ordered  - Instruct and encourage patient and family to use good hand hygiene technique  - Identify and instruct in appropriate isolation precautions for identified infection/condition  Outcome: Progressing     Problem: SAFETY ADULT  Goal: Patient will remain free of falls  Description: INTERVENTIONS:  - Assess patient frequently for physical needs  -  Identify cognitive and physical deficits and behaviors that affect risk of falls    -  [place] fall precautions as indicated by assessment   - Educate patient/family on patient safety including physical limitations  - Instruct patient to call for assistance with activity based on assessment  - Modify environment to reduce risk of injury  - Consider OT/PT consult to assist with strengthening/mobility  Outcome: Progressing  Goal: Maintain or return to baseline ADL function  Description: INTERVENTIONS:  -  Assess patient's ability to carry out ADLs; assess patient's baseline for ADL function and identify physical deficits which impact ability to perform ADLs (bathing, care of mouth/teeth, toileting, grooming, dressing, etc )  - Assess/evaluate cause of self-care deficits   - Assess range of motion  - Assess patient's mobility; develop plan if impaired  - Assess patient's need for assistive devices and provide as appropriate  - Encourage maximum independence but intervene and supervise when necessary  - Involve family in performance of ADLs  - Assess for home care needs following discharge   - Consider OT consult to assist with ADL evaluation and planning for discharge  - Provide patient education as appropriate  Outcome: Progressing  Goal: Maintain or return mobility status to optimal level  Description: INTERVENTIONS:  - Assess patient's baseline mobility status (ambulation, transfers, stairs, etc )    - Identify cognitive and physical deficits and behaviors that affect mobility  - Identify mobility aids required to assist with transfers and/or ambulation (gait belt, sit-to-stand, lift, walker, cane, etc )  - [place] fall precautions as indicated by assessment  - Record patient progress and toleration of activity level on Mobility SBAR; progress patient to next Phase/Stage  - Instruct patient to call for assistance with activity based on assessment  - Consider rehabilitation consult to assist with strengthening/weightbearing, etc   Outcome: Progressing     Problem: DISCHARGE PLANNING  Goal: Discharge to home or other facility with appropriate resources  Description: INTERVENTIONS:  - Identify barriers to discharge w/patient and caregiver  - Arrange for needed discharge resources and transportation as appropriate  - Identify discharge learning needs (meds, wound care, etc )  - Arrange for interpretive services to assist at discharge as needed  - Refer to Case Management Department for coordinating discharge planning if the patient needs post-hospital services based on physician/advanced practitioner order or complex needs related to functional status, cognitive ability, or social support system  Outcome: Progressing     Problem: Knowledge Deficit  Goal: Patient/family/caregiver demonstrates understanding of disease process, treatment plan, medications, and discharge instructions  Description: Complete learning assessment and assess knowledge base    Interventions:  - Provide teaching at level of understanding  - Provide teaching via preferred learning methods  Outcome: Progressing     Problem: HEMATOLOGIC - ADULT  Goal: Maintains hematologic stability  Description: INTERVENTIONS  - Assess for signs and symptoms of bleeding or hemorrhage  - Monitor labs  - Administer supportive blood products/factors as ordered and appropriate  Outcome: Progressing

## 2020-08-06 NOTE — TELEPHONE ENCOUNTER
----- Message from Leena Paris MD sent at 8/6/2020  9:32 AM EDT -----  Please tell him that the biopsies were all negative

## 2020-08-06 NOTE — PROGRESS NOTES
Patient with history of multiple falls chair alarm in place Pt  Refuses to keep alarm asked to call for help when toileting patient became verbally abusive and loud  There is no alarm on at this time     Nisa Bailey RN  10:26 PM  08/05/20

## 2020-08-07 LAB
ANION GAP SERPL CALCULATED.3IONS-SCNC: 8 MMOL/L (ref 4–13)
BASOPHILS # BLD AUTO: 0.02 THOUSANDS/ΜL (ref 0–0.1)
BASOPHILS NFR BLD AUTO: 1 % (ref 0–1)
BUN SERPL-MCNC: 13 MG/DL (ref 5–25)
CALCIUM SERPL-MCNC: 8.4 MG/DL (ref 8.3–10.1)
CHLORIDE SERPL-SCNC: 100 MMOL/L (ref 100–108)
CO2 SERPL-SCNC: 26 MMOL/L (ref 21–32)
CREAT SERPL-MCNC: 0.87 MG/DL (ref 0.6–1.3)
EOSINOPHIL # BLD AUTO: 0.08 THOUSAND/ΜL (ref 0–0.61)
EOSINOPHIL NFR BLD AUTO: 2 % (ref 0–6)
ERYTHROCYTE [DISTWIDTH] IN BLOOD BY AUTOMATED COUNT: 15.5 % (ref 11.6–15.1)
GFR SERPL CREATININE-BSD FRML MDRD: 93 ML/MIN/1.73SQ M
GLUCOSE SERPL-MCNC: 100 MG/DL (ref 65–140)
HAPTOGLOB SERPL-MCNC: 60 MG/DL (ref 32–363)
HCT VFR BLD AUTO: 26.3 % (ref 36.5–49.3)
HGB BLD-MCNC: 8.3 G/DL (ref 12–17)
IMM GRANULOCYTES # BLD AUTO: 0.07 THOUSAND/UL (ref 0–0.2)
IMM GRANULOCYTES NFR BLD AUTO: 2 % (ref 0–2)
LYMPHOCYTES # BLD AUTO: 0.79 THOUSANDS/ΜL (ref 0.6–4.47)
LYMPHOCYTES NFR BLD AUTO: 20 % (ref 14–44)
MCH RBC QN AUTO: 30.3 PG (ref 26.8–34.3)
MCHC RBC AUTO-ENTMCNC: 31.6 G/DL (ref 31.4–37.4)
MCV RBC AUTO: 96 FL (ref 82–98)
MONOCYTES # BLD AUTO: 0.29 THOUSAND/ΜL (ref 0.17–1.22)
MONOCYTES NFR BLD AUTO: 7 % (ref 4–12)
NEUTROPHILS # BLD AUTO: 2.72 THOUSANDS/ΜL (ref 1.85–7.62)
NEUTS SEG NFR BLD AUTO: 68 % (ref 43–75)
NRBC BLD AUTO-RTO: 0 /100 WBCS
PLATELET # BLD AUTO: 189 THOUSANDS/UL (ref 149–390)
PMV BLD AUTO: 8.7 FL (ref 8.9–12.7)
POTASSIUM SERPL-SCNC: 4.2 MMOL/L (ref 3.5–5.3)
RBC # BLD AUTO: 2.74 MILLION/UL (ref 3.88–5.62)
SODIUM SERPL-SCNC: 134 MMOL/L (ref 136–145)
WBC # BLD AUTO: 3.97 THOUSAND/UL (ref 4.31–10.16)

## 2020-08-07 PROCEDURE — 85025 COMPLETE CBC W/AUTO DIFF WBC: CPT | Performed by: GENERAL PRACTICE

## 2020-08-07 PROCEDURE — 99239 HOSP IP/OBS DSCHRG MGMT >30: CPT | Performed by: GENERAL PRACTICE

## 2020-08-07 PROCEDURE — 80048 BASIC METABOLIC PNL TOTAL CA: CPT | Performed by: GENERAL PRACTICE

## 2020-08-07 RX ORDER — FOLIC ACID 1 MG/1
1 TABLET ORAL DAILY
Status: DISCONTINUED | OUTPATIENT
Start: 2020-08-07 | End: 2020-08-07 | Stop reason: HOSPADM

## 2020-08-07 RX ORDER — PANTOPRAZOLE SODIUM 40 MG/1
40 TABLET, DELAYED RELEASE ORAL
Qty: 60 TABLET | Refills: 0 | Status: SHIPPED | OUTPATIENT
Start: 2020-08-08

## 2020-08-07 RX ORDER — FOLIC ACID 1 MG/1
1 TABLET ORAL DAILY
Qty: 30 TABLET | Refills: 0 | Status: SHIPPED | OUTPATIENT
Start: 2020-08-08

## 2020-08-07 RX ORDER — FERROUS SULFATE 325(65) MG
325 TABLET ORAL
Qty: 30 TABLET | Refills: 0 | Status: SHIPPED | OUTPATIENT
Start: 2020-08-08

## 2020-08-07 RX ADMIN — PANTOPRAZOLE SODIUM 40 MG: 40 TABLET, DELAYED RELEASE ORAL at 06:33

## 2020-08-07 RX ADMIN — VENLAFAXINE HYDROCHLORIDE 150 MG: 150 CAPSULE, EXTENDED RELEASE ORAL at 09:56

## 2020-08-07 RX ADMIN — FERROUS SULFATE TAB 325 MG (65 MG ELEMENTAL FE) 325 MG: 325 (65 FE) TAB at 09:55

## 2020-08-07 RX ADMIN — BUSPIRONE HYDROCHLORIDE 10 MG: 5 TABLET ORAL at 09:56

## 2020-08-07 RX ADMIN — LEVOTHYROXINE SODIUM 75 MCG: 75 TABLET ORAL at 06:33

## 2020-08-07 RX ADMIN — FOLIC ACID 1 MG: 1 TABLET ORAL at 09:55

## 2020-08-07 NOTE — SOCIAL WORK
GARY met with Pt today to discuss his d/c plan for today  Pt continued to refuse the recommendation of [facility] therapy services, and reported he will work on his own transportation for d/c today  No other needs at this time  GARY made RN and Dr Leahy Courts aware of the above

## 2020-08-07 NOTE — ASSESSMENT & PLAN NOTE
Patient with excessive poorly healing bruising on the right lower extremity and right upper extremity  He is also presenting with acute anemia    Hematology work up pending; hgb stable

## 2020-08-07 NOTE — PLAN OF CARE
Problem: Potential for Falls  Goal: Patient will remain free of falls  Description: INTERVENTIONS:  - Assess patient frequently for physical needs  -  Identify cognitive and physical deficits and behaviors that affect risk of falls    -  [place] fall precautions as indicated by assessment   - Educate patient/family on patient safety including physical limitations  - Instruct patient to call for assistance with activity based on assessment  - Modify environment to reduce risk of injury  - Consider OT/PT consult to assist with strengthening/mobility  Outcome: Progressing

## 2020-08-07 NOTE — ASSESSMENT & PLAN NOTE
Iron deficiency anemia  EGD did not reveal any source of bleeding  colonoscopy on 08/04/2020 with diverticulosis  Received IVB iron  needs iron supplement on discharge    Heme consult appreciated spep, upep, kappa and lambda light chains  hgb stable  SPEP, UPEP, retic elevated, hemolysis smear negative  Will need outpatient hematology follow up and capsule enteroscopy

## 2020-08-07 NOTE — DISCHARGE SUMMARY
Discharge- Jose Rafael Luz 1960, 63 y o  male MRN: [id number]    Unit/Bed#: -01 Encounter: [id number]    Primary Care Provider: Firman Severance, MD   Date and time admitted to hospital: 7/31/2020  6:45 PM        Bruising  Assessment & Plan  Patient with excessive poorly healing bruising on the right lower extremity and right upper extremity  He is also presenting with acute anemia  Hematology work up pending; hgb stable      Lower extremity edema  Assessment & Plan  Chronic lower extremities edema  He also had a fall with some bruising on his legs especially the right leg  Venous duplex during this admission was negative for DVT  Hypothyroidism  Assessment & Plan  Continue home dose levothyroxine  No need to change any does specially in acute setting and patient follow-up on outpatient basis with repeat TSH and any further adjustments in his replacement    Anxiety disorder  Assessment & Plan  Known history of anxiety and patient presents with anxious affect  Continue with home medications, such as Effexor and BuSpar  Avoid benzodiazepines  * Symptomatic anemia  Assessment & Plan  Iron deficiency anemia  EGD did not reveal any source of bleeding  colonoscopy on 08/04/2020 with diverticulosis  Received IVB iron  needs iron supplement on discharge    Heme consult appreciated spep, upep, kappa and lambda light chains  hgb stable  SPEP, UPEP, retic elevated, hemolysis smear negative  Will need outpatient hematology follow up and capsule enteroscopy          Discharging Physician / Practitioner: Damian Miller MD  PCP: Firman Severance, MD  Admission Date:   Admission Orders (From admission, onward)     Ordered        07/31/20 2049  Inpatient Admission (expected length of stay for this patient Order details is greater than two midnights)  Once                   Discharge Date: 08/07/20    Resolved Problems  Date Reviewed: 8/7/2020    None          [place]ultations [place] [facility] [place]:  · Hematology and GI    Procedures Performed:   · egd  8/3/20 and colonoscopy 8/4/20    Significant Findings / Test Results:   Anemia-likely fe deficient    Incidental Findings:   ·     Test Results Pending at Discharge (will require follow up):   ·      Outpatient Tests Requested:  · Capsule enteroscopy  · Cbc 1 week  · Hematology and GI follow up    Complications:      Reason for Admission: shortness of breath    Hospital Course:     Irene Russell is a 63 y o  male patient who originally presented to the hospital on 7/31/2020 due to shortness of breath  Please see above list of diagnoses and related plan for additional information  Condition at Discharge: stable     Discharge Day Visit / Exam:     * Please refer to separate progress note for these details *    Discussion with Family:     Discharge instructions/Information to patient and family:   See after visit summary for information provided to patient and family  Provisions for Follow-Up Care:  See after visit summary for information related to follow-up care and any pertinent home health orders  Disposition:     Home with VNA Services (Reminder: Complete face to face encounter)    For Discharges to [facility] SNF:   · Not Applicable to this Patient - Not Applicable to this Patient    Planned Readmission:      Discharge Statement:  I spent 40 minutes discharging the patient  This time was spent on the day of discharge  I had direct contact with the patient on the day of discharge  Greater than 50% of the total time was spent examining patient, answering all patient questions, arranging and discussing plan of care with patient as well as directly providing post-discharge instructions  Additional time then spent on discharge activities  Discharge Medications:  See after visit summary for reconciled discharge medications provided to patient and family        ** Please Note: This note has been constructed using a voice recognition system **

## 2020-08-07 NOTE — PROGRESS NOTES
Progress Note - Vineet Be 1960, 63 y o  male MRN: [id number]    Unit/Bed#: -01 Encounter: [id number]    Primary Care Provider: Phoebe Ivey MD   Date and time admitted to hospital: 7/31/2020  6:45 PM        Bruising  Assessment & Plan  Patient with excessive poorly healing bruising on the right lower extremity and right upper extremity  He is also presenting with acute anemia  Hematology work up pending; hgb stable      Lower extremity edema  Assessment & Plan  Chronic lower extremities edema  He also had a fall with some bruising on his legs especially the right leg  Venous duplex during this admission was negative for DVT  Hypothyroidism  Assessment & Plan  Continue home dose levothyroxine  No need to change any does specially in acute setting and patient follow-up on outpatient basis with repeat TSH and any further adjustments in his replacement    Anxiety disorder  Assessment & Plan  Known history of anxiety and patient presents with anxious affect  Continue with home medications, such as Effexor and BuSpar  Avoid benzodiazepines  * Symptomatic anemia  Assessment & Plan  Iron deficiency anemia  EGD did not reveal any source of bleeding  colonoscopy on 08/04/2020 with diverticulosis  Received IVB iron  needs iron supplement on discharge  Heme consult appreciated spep, upep, kappa and lambda light chains  hgb stable  SPEP, UPEP, retic elevated, hemolysis smear negative  Will need outpatient hematology follow up and capsule enteroscopy        VTE Pharmacologic Prophylaxis:   Pharmacologic: Pharmacologic VTE Prophylaxis contraindicated due to anemia  Mechanical VTE Prophylaxis in Place: Yes    Patient Centered Rounds: I have performed bedside rounds with nursing staff today  Discussions with Specialists or Other Care Team Provider:     Education and Discussions with Family / Patient:     Time Spent for Care: 30 minutes    More than 50% of total time spent on counseling and coordination of care as described above  Current Length of Stay: 7 day(s)    Current Patient Status: Inpatient   Certification Statement: The patient will continue to require additional inpatient hospital stay due to discharge today    Discharge Plan: STR vs home PT    Code Status: Level 3 - DNAR and DNI      Subjective:   Feels tired, denies chest pain or shortness of breath  Objective:     Vitals:   No data recorded  Body mass index is 35 43 kg/m²  Input and Output Summary (last 24 hours): Intake/Output Summary (Last 24 hours) at 8/7/2020 0735  Last data filed at 8/6/2020 1900  Gross per 24 hour   Intake 1000 ml   Output 850 ml   Net 150 ml       Physical Exam:     Physical Exam  Constitutional:       Appearance: He is well-developed and well-nourished  HENT:      Head: Normocephalic and atraumatic  Eyes:      Pupils: Pupils are equal, round, and reactive to light  Neck:      Musculoskeletal: Neck supple  Cardiovascular:      Rate and Rhythm: Normal rate and regular rhythm  Pulmonary:      Effort: Pulmonary effort is normal       Breath sounds: Normal breath sounds  Abdominal:      General: Bowel sounds are normal       Palpations: Abdomen is soft  Musculoskeletal:         General: Edema present  Skin:     General: Skin is warm  Coloration: Skin is pale  Neurological:      Mental Status: He is alert     Psychiatric:         Mood and Affect: Mood and affect normal          Behavior: Behavior normal            Additional Data:     Labs:    Results from last 7 days   Lab Units 08/07/20  0449   WBC Thousand/uL 3 97*   HEMOGLOBIN g/dL 8 3*   HEMATOCRIT % 26 3*   PLATELETS Thousands/uL 189   NEUTROS PCT % 68   LYMPHS PCT % 20   MONOS PCT % 7   EOS PCT % 2     Results from last 7 days   Lab Units 08/07/20  0449 08/06/20  0951   SODIUM mmol/L 134* 136   POTASSIUM mmol/L 4 2 3 9   CHLORIDE mmol/L 100 102   CO2 mmol/L 26 27   BUN mg/dL 13 13   CREATININE mg/dL 0 87 0 93   ANION GAP mmol/L 8 7   CALCIUM mg/dL 8 4 8 4   ALBUMIN g/dL  --  3 0*   TOTAL BILIRUBIN mg/dL  --  1 00   ALK PHOS U/L  --  82   ALT U/L  --  25   AST U/L  --  22   GLUCOSE RANDOM mg/dL 100 69     Results from last 7 days   Lab Units 07/31/20  1949   INR  1 17                       * I Have Reviewed All Lab Data Listed Above  * Additional Pertinent Lab Tests Reviewed: All Labs Within Last 24 Hours Reviewed    Imaging:    Imaging Reports Reviewed Today Include:   Imaging Personally Reviewed by Myself Includes:      Recent Cultures (last 7 days):           Last 24 Hours Medication List:   acetaminophen, 650 mg, Oral, Q6H PRN, Tejas Espinal MD  aluminum-magnesium hydroxide-simethicone, 30 mL, Oral, Q6H PRN, Tejas Espinal MD  busPIRone, 10 mg, Oral, TID, Tejas Espinal MD  cyclobenzaprine, 10 mg, Oral, HS, Ann Marie Mendes III, MD  docusate sodium, 100 mg, Oral, BID PRN, Ann Marie Mendes III, MD  ferrous sulfate, 325 mg, Oral, Daily With Breakfast, Tejas Espinal MD  folic acid, 1 mg, Oral, Daily, Kimberlyn Proctor MD  levothyroxine, 75 mcg, Oral, Early Morning, Tejas Espinal MD  ondansetron, 4 mg, Intravenous, Q6H PRN, Tejas Espinal MD  pantoprazole, 40 mg, Oral, Early Morning, Tejas Espinal MD  venlafaxine, 150 mg, Oral, Daily, Tjeas Espinal MD         Today, Patient Was Seen By: Kimberlyn Proctor MD    ** Please Note: Dictation voice to text software may have been used in the creation of this document   **

## 2020-08-07 NOTE — DISCHARGE INSTRUCTIONS
Anemia   WHAT YOU NEED TO KNOW:   Anemia is a low number of red blood cells or a low amount of hemoglobin in your red blood cells  Hemoglobin is a protein that helps carry oxygen throughout your body  Red blood cells use iron to create hemoglobin  Anemia may develop if your body does not have enough iron  It may also develop if your body does not make enough red blood cells or they die faster than your body can make them  DISCHARGE INSTRUCTIONS:   Call 911 or have someone call 911 for any of the following:   · You lose consciousness  · You have severe chest pain  Seek care immediately if:   · You have dark or bloody bowel movements  Contact your healthcare provider if:   · Your symptoms are worse, even after treatment  · You have questions or concerns about your condition or care  Medicines:   · Iron or folic acid supplements  help increase your red blood cell and hemoglobin levels  · Vitamin B12 injections  may help boost your red blood cell level and decrease your symptoms  Ask your healthcare provider how to inject B12  · Take your medicine as directed  Contact your healthcare provider if you think your medicine is not helping or if you have side effects  Tell him of her if you are allergic to any medicine  Keep a list of the medicines, vitamins, and herbs you take  Include the amounts, and when and why you take them  Bring the list or the pill bottles to follow-up visits  Carry your medicine list with you in case of an emergency  Prevent anemia:  Eat healthy foods rich in iron and vitamin C  Nuts, meat, dark leafy green vegetables, and beans are high in iron and protein  Vitamin C helps your body absorb iron  Foods rich in vitamin C include oranges and other citrus fruits  Ask your healthcare provider for a list of other foods that are high in iron or vitamin C  Ask if you need to be on a special diet     Follow up with your healthcare provider as directed:  Write down your questions so you remember to ask them during your visits  © 2017 [employer] [employer] Information is for End User's use only and may not be sold, redistributed or otherwise used for commercial purposes  All illustrations and images included in CareNotes® are the copyrighted property of A D A M , Inc  or Chuy Whitt  The above information is an  only  It is not intended as medical advice for individual conditions or treatments  Talk to your doctor, nurse or pharmacist before following any medical regimen to see if it is safe and effective for you

## 2020-08-08 LAB
ALBUMIN SERPL ELPH-MCNC: 3.42 G/DL (ref 3.5–5)
ALBUMIN SERPL ELPH-MCNC: 58 % (ref 52–65)
ALPHA1 GLOB SERPL ELPH-MCNC: 0.57 G/DL (ref 0.1–0.4)
ALPHA1 GLOB SERPL ELPH-MCNC: 9.6 % (ref 2.5–5)
ALPHA2 GLOB SERPL ELPH-MCNC: 0.54 G/DL (ref 0.4–1.2)
ALPHA2 GLOB SERPL ELPH-MCNC: 9.2 % (ref 7–13)
BETA GLOB ABNORMAL SERPL ELPH-MCNC: 0.34 G/DL (ref 0.4–0.8)
BETA1 GLOB SERPL ELPH-MCNC: 5.8 % (ref 5–13)
BETA2 GLOB SERPL ELPH-MCNC: 4.9 % (ref 2–8)
BETA2+GAMMA GLOB SERPL ELPH-MCNC: 0.29 G/DL (ref 0.2–0.5)
GAMMA GLOB ABNORMAL SERPL ELPH-MCNC: 0.74 G/DL (ref 0.5–1.6)
GAMMA GLOB SERPL ELPH-MCNC: 12.5 % (ref 12–22)
IGG/ALB SER: 1.38 {RATIO} (ref 1.1–1.8)
PROT PATTERN SERPL ELPH-IMP: ABNORMAL
PROT SERPL-MCNC: 5.9 G/DL (ref 6.4–8.2)

## 2020-08-10 ENCOUNTER — TELEPHONE (OUTPATIENT)
Dept: SURGICAL ONCOLOGY | Facility: CLINIC | Age: 60
End: 2020-08-10

## 2020-08-10 ENCOUNTER — TELEPHONE (OUTPATIENT)
Dept: GASTROENTEROLOGY | Facility: CLINIC | Age: 60
End: 2020-08-10

## 2020-08-10 NOTE — TELEPHONE ENCOUNTER
Patient will not go through with the vce due to having to prep the day before, I offered him the option to do a water prep and he stated that water will kill him , and that nobody is listening to what he wants done  He stated he wanted this done in the hospital and he was told it was not an inpatient procedure, which is accurate  Patient began yelling at me and hung up the phone  You can see by the other messages that this is not the only call that was inappropriate

## 2020-08-10 NOTE — UTILIZATION REVIEW
Notification of Discharge  This is a Notification of Discharge from our facility [address]  Please be advised that this patient has been discharge from our facility  Below you will find the admission and discharge date and time including the patients disposition  PRESENTATION DATE: 7/31/2020  6:45 PM  OBS ADMISSION DATE:   IP ADMISSION DATE: 7/31/20 2049   DISCHARGE DATE: 8/7/2020  4:32 PM  DISPOSITION: Home/Self Care Home/Self Care   Admission Orders listed below:  Admission Orders (From admission, onward)     Ordered        07/31/20 2049  Inpatient Admission (expected length of stay for this patient Order details is greater than two midnights)  Once                   Please contact the UR Department if additional information is required to close this patient's authorization/case  [address] [facility] Utilization Review Department  Main: [phone] x carefully listen to the prompts  All voicemails are confidential   [email or web address]  Send all requests for admission clinical reviews, approved or denied determinations and any other requests to dedicated fax number below belonging to the campus where the patient is receiving treatment   List of dedicated fax numbers:  [address] DENIALS (Administrative/Medical Necessity) [phone]   [address] (Maternity/NICU/Pediatrics) [phone]   Colette Thomason [phone]   Tiffany Sorensen [phone]   [place]  [phone]   Ed Root [facility] [address] [place] [address] [phone]   [facility]  [phone]   [address] [facility]  [address] [facility] [address] [facility] [phone]

## 2020-08-10 NOTE — TELEPHONE ENCOUNTER
Pt bert Gallegos     Pt called he was recently released from the hospital , he had a colonscopy and Egd , diagnosed with anemia and was told to schedule a capsule endo    Upon explaing that he will be called by the  to schedule a date and be given the prep, pt then stated he can not do any sort of prep while at home due to all his illnesses, he requested that they do this while he was admitted and was told he could not

## 2020-08-10 NOTE — TELEPHONE ENCOUNTER
[place],    I am not sure why this patient came to me for review  Dr Fatoumata Waterman perform the EGD and colonoscopy and ordered the video capsule endoscopy

## 2021-04-13 DIAGNOSIS — Z23 ENCOUNTER FOR IMMUNIZATION: ICD-10-CM

## 2022-01-13 ENCOUNTER — APPOINTMENT (OUTPATIENT)
Dept: LAB | Facility: HOSPITAL | Age: 62
End: 2022-01-13
Payer: COMMERCIAL

## 2022-01-13 DIAGNOSIS — E03.8 HYPOTHYROIDISM DUE TO FIBROUS INVASIVE THYROIDITIS: ICD-10-CM

## 2022-01-13 DIAGNOSIS — E06.3 CHRONIC LYMPHOCYTIC THYROIDITIS: ICD-10-CM

## 2022-01-13 DIAGNOSIS — E78.2 MIXED HYPERLIPIDEMIA: ICD-10-CM

## 2022-01-13 DIAGNOSIS — Z78.9 NORMAL TISSUE: ICD-10-CM

## 2022-01-13 DIAGNOSIS — E06.5 HYPOTHYROIDISM DUE TO FIBROUS INVASIVE THYROIDITIS: ICD-10-CM

## 2022-01-13 LAB
ALT SERPL W P-5'-P-CCNC: 25 U/L (ref 12–78)
ANION GAP SERPL CALCULATED.3IONS-SCNC: 9 MMOL/L (ref 4–13)
AST SERPL W P-5'-P-CCNC: 20 U/L (ref 5–45)
BASOPHILS # BLD AUTO: 0.03 THOUSANDS/ΜL (ref 0–0.1)
BASOPHILS NFR BLD AUTO: 1 % (ref 0–1)
BUN SERPL-MCNC: 13 MG/DL (ref 5–25)
CALCIUM SERPL-MCNC: 8.9 MG/DL (ref 8.3–10.1)
CHLORIDE SERPL-SCNC: 106 MMOL/L (ref 100–108)
CHOLEST SERPL-MCNC: 226 MG/DL
CO2 SERPL-SCNC: 29 MMOL/L (ref 21–32)
CREAT SERPL-MCNC: 1.04 MG/DL (ref 0.6–1.3)
EOSINOPHIL # BLD AUTO: 0.11 THOUSAND/ΜL (ref 0–0.61)
EOSINOPHIL NFR BLD AUTO: 3 % (ref 0–6)
ERYTHROCYTE [DISTWIDTH] IN BLOOD BY AUTOMATED COUNT: 12.5 % (ref 11.6–15.1)
GFR SERPL CREATININE-BSD FRML MDRD: 76 ML/MIN/1.73SQ M
GLUCOSE P FAST SERPL-MCNC: 104 MG/DL (ref 65–99)
HCT VFR BLD AUTO: 44.5 % (ref 36.5–49.3)
HDLC SERPL-MCNC: 46 MG/DL
HGB BLD-MCNC: 15.1 G/DL (ref 12–17)
IMM GRANULOCYTES # BLD AUTO: 0.01 THOUSAND/UL (ref 0–0.2)
IMM GRANULOCYTES NFR BLD AUTO: 0 % (ref 0–2)
LDLC SERPL CALC-MCNC: 146 MG/DL (ref 0–100)
LYMPHOCYTES # BLD AUTO: 0.88 THOUSANDS/ΜL (ref 0.6–4.47)
LYMPHOCYTES NFR BLD AUTO: 21 % (ref 14–44)
MCH RBC QN AUTO: 31.5 PG (ref 26.8–34.3)
MCHC RBC AUTO-ENTMCNC: 33.9 G/DL (ref 31.4–37.4)
MCV RBC AUTO: 93 FL (ref 82–98)
MONOCYTES # BLD AUTO: 0.49 THOUSAND/ΜL (ref 0.17–1.22)
MONOCYTES NFR BLD AUTO: 12 % (ref 4–12)
NEUTROPHILS # BLD AUTO: 2.7 THOUSANDS/ΜL (ref 1.85–7.62)
NEUTS SEG NFR BLD AUTO: 63 % (ref 43–75)
NONHDLC SERPL-MCNC: 180 MG/DL
NRBC BLD AUTO-RTO: 0 /100 WBCS
PLATELET # BLD AUTO: 188 THOUSANDS/UL (ref 149–390)
PMV BLD AUTO: 9.2 FL (ref 8.9–12.7)
POTASSIUM SERPL-SCNC: 4.2 MMOL/L (ref 3.5–5.3)
RBC # BLD AUTO: 4.8 MILLION/UL (ref 3.88–5.62)
SODIUM SERPL-SCNC: 144 MMOL/L (ref 136–145)
TRIGL SERPL-MCNC: 171 MG/DL
TSH SERPL DL<=0.05 MIU/L-ACNC: 3.04 UIU/ML (ref 0.36–3.74)
WBC # BLD AUTO: 4.22 THOUSAND/UL (ref 4.31–10.16)

## 2022-01-13 PROCEDURE — 84450 TRANSFERASE (AST) (SGOT): CPT

## 2022-01-13 PROCEDURE — 84460 ALANINE AMINO (ALT) (SGPT): CPT

## 2022-01-13 PROCEDURE — 84443 ASSAY THYROID STIM HORMONE: CPT

## 2022-01-13 PROCEDURE — 80048 BASIC METABOLIC PNL TOTAL CA: CPT

## 2022-01-13 PROCEDURE — 36415 COLL VENOUS BLD VENIPUNCTURE: CPT

## 2022-01-13 PROCEDURE — 80061 LIPID PANEL: CPT

## 2022-01-13 PROCEDURE — 85025 COMPLETE CBC W/AUTO DIFF WBC: CPT

## 2023-02-22 NOTE — TELEPHONE ENCOUNTER
New Patient Encounter    New Patient Intake Form   Patient Details:  Izabella Blank  1960  [id number]    Background Information:  [address] starts by opening a telephone encounter and gathering the following information   Who is calling to schedule? If not self, relationship to patient? self   Referring Provider Pt was hospitalized   What is the diagnosis? Hemolytic anemia  Pt was hospitalized   Is this diagnosis confirmed? Yes   When was the diagnosis? 8/2020   Is there a confirmed diagnosis from a biopsy/tissue reviewed by pathology? Is patient aware of diagnosis? Yes   Is there a personal history and what kind? Is there a family history and what kind? Reason for visit? New Diagnosis   Have you had any imaging or labs done? If so: when, where? yes     Are records in EPIC? yes   Was the patient told to bring a disk? no   Does the patient smoke or Vape? no   If yes, how many packs or cartridges per day? Scheduling Information:   [facility] [place]     Are there any dates/time the patient cannot be seen? Miscellaneous: pt hung up when I gave him the next available appt  After completing the above information, please route to Financial Counselor and the appropriate Nurse Navigator for review 
Her/She

## 2023-03-27 NOTE — PROGRESS NOTES
3/28/2023      Chief Complaint   Patient presents with   • Benign Prostatic Hypertrophy     Patient unable to void for Uroflow         Assessment and Plan    60 y o  male     1  BPH with medically refractory lower urinary tract symptoms  -Failed prior alpha blockade and anticholinergics  - Status post UroLift in 2019  - AUA today 28  - Uroflow today unable to be obtained  - PVR today 38 mL  - Reviewed conservative measures  - Follow-up for repeat cystoscopy TRUS  - Call with any questions or concerns in the meantime  - All questions answered; patient understands and agrees with plan    2  Prostate cancer screening  - No recent PSA on file for review  - COSTA today deferred   - PSA in the near future and will call with results  History of Present Illness  Devin Langley is a 60 y o  male patient with history of medically refractory lower urinary tract symptoms status post UroLift in 2019 here for follow up  Prior failed alpha blockade and anticholinergics  Continues to have urinary symptoms that are bothersome including frequency, urgency, weakened urinary stream, hesistancy  Denies split stream, gross hematuria, bone pain, weight loss  No recent PSA on file for review  Review of Systems   Constitutional: Negative for activity change, appetite change, chills and fever  HENT: Negative for congestion and trouble swallowing  Respiratory: Negative for cough and shortness of breath  Cardiovascular: Negative for chest pain, palpitations and leg swelling  Gastrointestinal: Negative for abdominal pain, constipation, diarrhea, nausea and vomiting  Genitourinary: Positive for frequency and urgency  Negative for difficulty urinating, dysuria, flank pain and hematuria  Musculoskeletal: Negative for back pain and gait problem  Skin: Negative for wound  Allergic/Immunologic: Negative for immunocompromised state  Neurological: Negative for dizziness and syncope     Hematological: Does not "bruise/bleed easily  Psychiatric/Behavioral: Negative for confusion  All other systems reviewed and are negative  AUA SYMPTOM SCORE    Flowsheet Row Most Recent Value   AUA SYMPTOM SCORE    How often have you had a sensation of not emptying your bladder completely after you finished urinating? 5 (P)     How often have you had to urinate again less than two hours after you finished urinating? 5 (P)     How often have you found you stopped and started again several times when you urinate? 5 (P)     How often have you found it difficult to postpone urination? 5 (P)     How often have you had a weak urinary stream? 5 (P)     How often have you had to push or strain to begin urination? 5 (P)     How many times did you most typically get up to urinate from the time you went to bed at night until the time you got up in the morning? 5 (P)     Quality of Life: If you were to spend the rest of your life with your urinary condition just the way it is now, how would you feel about that? 5 (P)     AUA SYMPTOM SCORE 35 (P)            Vitals  Vitals:    03/28/23 1024   BP: 122/84   BP Location: Left arm   Patient Position: Sitting   Cuff Size: Adult   Weight: 112 kg (247 lb)   Height: 5' 10\" (1 778 m)       Physical Exam  Constitutional:       General: He is not in acute distress  Appearance: Normal appearance  He is not ill-appearing, toxic-appearing or diaphoretic  HENT:      Head: Normocephalic  Nose: No congestion  Eyes:      General: No scleral icterus  Right eye: No discharge  Left eye: No discharge  Conjunctiva/sclera: Conjunctivae normal       Pupils: Pupils are equal, round, and reactive to light  Pulmonary:      Effort: Pulmonary effort is normal    Musculoskeletal:      Cervical back: Normal range of motion  Skin:     General: Skin is warm and dry  Coloration: Skin is not jaundiced or pale  Findings: No bruising, erythema, lesion or rash     Neurological:      " General: No focal deficit present  Mental Status: He is alert and oriented to person, place, and time  Mental status is at baseline  Gait: Gait normal    Psychiatric:         Mood and Affect: Mood normal          Behavior: Behavior normal          Thought Content: Thought content normal          Judgment: Judgment normal            Past History  Past Medical History:   Diagnosis Date   • Anxiety    • Arthritis    • Colon polyp    • Disease of thyroid gland    • Hashimoto thyroiditis, fibrous variant    • Irritable bowel syndrome    • Muscle weakness    • Shortness of breath      Social History     Socioeconomic History   • Marital status: Single     Spouse name: None   • Number of children: None   • Years of education: None   • Highest education level: None   Occupational History   • None   Tobacco Use   • Smoking status: Former     Types: Cigarettes     Quit date: 2008     Years since quitting: 15 2   • Smokeless tobacco: Never   Vaping Use   • Vaping Use: Never used   Substance and Sexual Activity   • Alcohol use: Never   • Drug use: Never   • Sexual activity: Not Currently   Other Topics Concern   • None   Social History Narrative   • None     Social Determinants of Health     Financial Resource Strain: Not on file   Food Insecurity: Not on file   Transportation Needs: Not on file   Physical Activity: Not on file   Stress: Not on file   Social Connections: Not on file   Intimate Partner Violence: Not on file   Housing Stability: Not on file     Social History     Tobacco Use   Smoking Status Former   • Types: Cigarettes   • Quit date: 2008   • Years since quitting: 15 2   Smokeless Tobacco Never     History reviewed  No pertinent family history      The following portions of the patient's history were reviewed and updated as appropriate: allergies, current medications, past medical history, past social history, past surgical history and problem list     Results  Recent Results (from the past 1 hour(s)) POCT Measure PVR    Collection Time: 03/28/23 10:31 AM   Result Value Ref Range    POST-VOID RESIDUAL VOLUME, ML POC 38 mL   ]  No results found for: PSA  Lab Results   Component Value Date    CALCIUM 8 9 01/13/2022    K 4 2 01/13/2022    CO2 29 01/13/2022     01/13/2022    BUN 13 01/13/2022    CREATININE 1 04 01/13/2022     Lab Results   Component Value Date    WBC 4 22 (L) 01/13/2022    HGB 15 1 01/13/2022    HCT 44 5 01/13/2022    MCV 93 01/13/2022     01/13/2022       Maegan Rodríguez PA-C

## 2023-03-28 ENCOUNTER — OFFICE VISIT (OUTPATIENT)
Dept: UROLOGY | Facility: CLINIC | Age: 63
End: 2023-03-28

## 2023-03-28 VITALS
BODY MASS INDEX: 35.36 KG/M2 | SYSTOLIC BLOOD PRESSURE: 122 MMHG | WEIGHT: 247 LBS | HEIGHT: 70 IN | DIASTOLIC BLOOD PRESSURE: 84 MMHG

## 2023-03-28 DIAGNOSIS — N40.1 BENIGN PROSTATIC HYPERPLASIA WITH NOCTURIA: Primary | ICD-10-CM

## 2023-03-28 DIAGNOSIS — Z12.5 SCREENING FOR PROSTATE CANCER: ICD-10-CM

## 2023-03-28 DIAGNOSIS — R35.1 BENIGN PROSTATIC HYPERPLASIA WITH NOCTURIA: Primary | ICD-10-CM

## 2023-03-28 LAB — POST-VOID RESIDUAL VOLUME, ML POC: 38 ML

## 2023-03-28 RX ORDER — LEVOTHYROXINE SODIUM 0.1 MG/1
TABLET ORAL
COMMUNITY

## 2023-03-28 RX ORDER — DICYCLOMINE HYDROCHLORIDE 10 MG/1
CAPSULE ORAL
COMMUNITY

## 2023-03-28 RX ORDER — IBUPROFEN 600 MG/1
TABLET ORAL
COMMUNITY

## 2023-08-17 LAB — PSA SERPL-MCNC: 3.78 NG/ML

## 2023-08-21 PROBLEM — Z71.2 ENCOUNTER TO DISCUSS TEST RESULTS: Status: ACTIVE | Noted: 2023-08-21

## 2023-08-21 NOTE — PROGRESS NOTES
Problem List Items Addressed This Visit        Genitourinary    OAB (overactive bladder)       Other    Benign prostatic hyperplasia with nocturia - Primary    Relevant Orders    POCT urine dip    Encounter to discuss test results    Muscle weakness               Discussion:      Pepe Rousseau and I discussed his ongoing lower urinary tract symptoms as indicated by a low bladder capacity with urgency and frequency and also a sensation of intermittency with slow stream.  I did describe to him the difference between storage and emptying lower urinary tract symptoms and the recommendation for cystoscopy and transrectal ultrasound for further evaluation of his anatomy at this time. His PSA is 3.7, within the normal range. He does wear diapers when he goes out due to some leakage. He does not wear these at home. TRUS shows a 40 gram gland, cystoscopy shows an anterior channel through the prostate but tightness at the bladder neck. Surgical options include iTIND versus TURP. I described to him the pre and armani procedural care for urodynamic studies and he wishes to proceed with this. This will be helpful in determining bladder capacity, compliance, contractility, and conscious sensation prior to proceeding to any other outlet procedures and prior to possible cystoscopy botox or interstim placement        Portions of the above record have been created with voice recognition software. Occasional wrong word or "sound alike" substitution may have occurred due to the inherent limitations of voice recognition software. Read the chart carefully and recognize, using context, where substitution may have occurred. Assessment and plan:       Please see problem oriented charting for the assessment plan of today's urological complaints      Allie Mehta MD      Chief Complaint     As listed above      History of Present Illness     Jarrett Oropeza is a 58 y.o. man with BPH/LUTS and bother.  He is s/p urolift in 2019 with some slight improvement but with continued bother and urgency, frequency, and slow stream.    The following portions of the patient's history were reviewed and updated as appropriate: allergies, current medications, past family history, past medical history, past social history, past surgical history and problem list.    Detailed Urologic History     - please refer to HPI    Review of Systems     Review of Systems   Constitutional: Positive for fatigue. HENT: Negative. Eyes: Negative. Respiratory: Negative. Cardiovascular: Negative. Gastrointestinal: Negative. Endocrine: Negative. Genitourinary: Positive for difficulty urinating, frequency and urgency. Musculoskeletal: Negative. Skin: Negative. Allergic/Immunologic: Negative. Neurological: Negative. Hematological: Negative. Psychiatric/Behavioral: Negative. AUA SYMPTOM SCORE    Flowsheet Row Most Recent Value   AUA SYMPTOM SCORE    How often have you had a sensation of not emptying your bladder completely after you finished urinating? 5 (P)     How often have you had to urinate again less than two hours after you finished urinating? 4 (P)     How often have you found you stopped and started again several times when you urinate? 5 (P)     How often have you found it difficult to postpone urination? 4 (P)     How often have you had a weak urinary stream? 5 (P)     How often have you had to push or strain to begin urination? 2 (P)     How many times did you most typically get up to urinate from the time you went to bed at night until the time you got up in the morning? 5 (P)     Quality of Life: If you were to spend the rest of your life with your urinary condition just the way it is now, how would you feel about that? 5 (P)     AUA SYMPTOM SCORE 30 (P)             Allergies     Allergies   Allergen Reactions   • Meloxicam Other (See Comments)     Anemia could be related!    • Aspirin      Stomach pain     • Halobetasol Hallucinations   • Olanzapine Hallucinations   • Milk (Cow) Diarrhea       Physical Exam     Physical Exam  Vitals reviewed. Constitutional:       General: He is not in acute distress. Appearance: Normal appearance. He is not ill-appearing, toxic-appearing or diaphoretic. HENT:      Head: Normocephalic and atraumatic. Eyes:      General: No scleral icterus. Right eye: No discharge. Left eye: No discharge. Cardiovascular:      Pulses: Normal pulses. Pulmonary:      Effort: Pulmonary effort is normal.   Abdominal:      General: There is no distension. Genitourinary:     Penis: Normal.    Musculoskeletal:         General: No swelling. Skin:     Coloration: Skin is not jaundiced. Neurological:      General: No focal deficit present. Mental Status: He is alert. Motor: Weakness (mentions this as a subjective complaint and mentions have an as yet undiagnosed/specified muscular disorder) present. Psychiatric:         Mood and Affect: Mood normal.         Behavior: Behavior normal.         Thought Content:  Thought content normal.         Judgment: Judgment normal.             Vital Signs  Vitals:    08/22/23 0839   BP: 122/70   BP Location: Left arm   Patient Position: Sitting   Cuff Size: Large   Pulse: 96   Resp: 16   SpO2: 97%   Weight: 101 kg (222 lb 9.6 oz)   Height: 5' 10" (1.778 m)         Current Medications       Current Outpatient Medications:   •  busPIRone (BUSPAR) 10 mg tablet, Take by mouth 3 (three) times a day , Disp: , Rfl:   •  cyclobenzaprine (FLEXERIL) 10 mg tablet, 10 mg daily at bedtime , Disp: , Rfl:   •  ibuprofen (MOTRIN) 600 mg tablet, ibuprofen 600 mg tablet, Disp: , Rfl:   •  levothyroxine 100 mcg tablet, levothyroxine 100 mcg tablet, Disp: , Rfl:   •  venlafaxine (EFFEXOR-XR) 150 mg 24 hr capsule, Take 150 mg by mouth daily, Disp: , Rfl:       Active Problems     Patient Active Problem List   Diagnosis   • Benign prostatic hyperplasia with nocturia • Anxiety disorder   • Hypothyroidism   • OAB (overactive bladder)   • Symptomatic anemia   • Hyponatremia   • Muscle spasm   • Lower extremity edema   • Bruising   • Hiatal hernia   • Encounter to discuss test results   • Muscle weakness         Past Medical History     Past Medical History:   Diagnosis Date   • Anxiety    • Arthritis    • Colon polyp    • Disease of thyroid gland    • Hashimoto thyroiditis, fibrous variant    • Irritable bowel syndrome    • Muscle weakness    • Shortness of breath          Surgical History     Past Surgical History:   Procedure Laterality Date   • COLONOSCOPY     • HERNIA REPAIR  2003   • ID CYSTO INSERTION TRANSPROSTATIC IMPLANT SINGLE N/A 11/7/2019    Procedure: CYSTOSCOPY WITH INSERTION Feliz Fitting;  Surgeon: Brooke Davis MD;  Location: [facility] OR;  Service: Urology         Family History     History reviewed. No pertinent family history.       Social History     Social History     Social History     Tobacco Use   Smoking Status Former   • Types: Cigarettes   • Quit date: 2008   • Years since quitting: 15.6   Smokeless Tobacco Never         Pertinent Lab Values     Lab Results   Component Value Date    CREATININE 1.04 01/13/2022       Lab Results   Component Value Date    PSA 3.78 08/16/2023       PVR 38 mL March 2023

## 2023-08-22 ENCOUNTER — PROCEDURE VISIT (OUTPATIENT)
Dept: UROLOGY | Facility: CLINIC | Age: 63
End: 2023-08-22
Payer: COMMERCIAL

## 2023-08-22 ENCOUNTER — TELEPHONE (OUTPATIENT)
Dept: UROLOGY | Facility: CLINIC | Age: 63
End: 2023-08-22

## 2023-08-22 VITALS
HEIGHT: 70 IN | SYSTOLIC BLOOD PRESSURE: 122 MMHG | OXYGEN SATURATION: 97 % | RESPIRATION RATE: 16 BRPM | HEART RATE: 96 BPM | BODY MASS INDEX: 31.87 KG/M2 | WEIGHT: 222.6 LBS | DIASTOLIC BLOOD PRESSURE: 70 MMHG

## 2023-08-22 DIAGNOSIS — M62.81 MUSCLE WEAKNESS: ICD-10-CM

## 2023-08-22 DIAGNOSIS — N32.81 OAB (OVERACTIVE BLADDER): ICD-10-CM

## 2023-08-22 DIAGNOSIS — R35.1 BENIGN PROSTATIC HYPERPLASIA WITH NOCTURIA: Primary | ICD-10-CM

## 2023-08-22 DIAGNOSIS — Z71.2 ENCOUNTER TO DISCUSS TEST RESULTS: ICD-10-CM

## 2023-08-22 DIAGNOSIS — N40.1 BENIGN PROSTATIC HYPERPLASIA WITH NOCTURIA: Primary | ICD-10-CM

## 2023-08-22 PROCEDURE — 52000 CYSTOURETHROSCOPY: CPT | Performed by: UROLOGY

## 2023-08-22 PROCEDURE — 99214 OFFICE O/P EST MOD 30 MIN: CPT | Performed by: UROLOGY

## 2023-08-22 PROCEDURE — 76872 US TRANSRECTAL: CPT | Performed by: UROLOGY

## 2023-08-22 NOTE — PROGRESS NOTES
Office TRUS    Indication    persistent LUTS    Informed consent   The risks, benefits and alternatives to this procedures were discussed with the patient and he has participated in the informed consent process and wishes to proceed    Antibiotic prophylaxis   Ceftriaxone    Rectal cleansing  The patient was instructed to perform an evacuating rectal enema 1-2 hours prior to biopsy. Local anesthesia  Topical 2% lidocaine jelly was applied liberally to the anus and rectum and allowed to dwell for at least 5 min prior to starting the procedure. After insertion of the TRUS probe, 10 mL of 2% lidocaine solution was injected with ultrasound guidance at the  junction of the prostate and seminal vesicles. The anesthetic was allowed to dwell for at least 2 minutes prior to biopsy. Transrectal ultrasonography  The patient was placed in the left lateral decubitus position. After an attentive digital rectal examination, a 7.5 mHz sidefire ultrasound probe was gently inserted into the rectum and biplanar imaging of the prostate was done with the findings noted below. Images were taken of any abnormal findings and also to document prostate size. Bladder  The bladder base appeared normal.    Prostate      Ultrasound size measurements:  -Volume:  40.8 cm3    Ultrasound findings:  -Cysts: None  -Masses: None  -Median lobe: absent                  Complications  There were no procedural complications. Disposition  The patient was dismissed to home     Post-procedure instructions: Today he underwent an uncomplicated transrectal ultrasound showing a 40 gram gland and no intravesical prostatic protrusion, there is tightness at the bladder neck          Biopsy prostate     Date/Time 8/22/2023 9:30 AM     Performed by  Yong Serrano MD   Authorized by Yong Serrano MD     Universal Protocol   Consent: Verbal consent obtained. Written consent obtained.   Risks and benefits: risks, benefits and alternatives were discussed  Consent given by: patient  Patient understanding: patient states understanding of the procedure being performed  Patient consent: the patient's understanding of the procedure matches consent given  Procedure consent: procedure consent matches procedure scheduled  Relevant documents: relevant documents present and verified  Test results: test results available and properly labeled  Site marked: the operative site was not marked  Radiology Images displayed and confirmed. If images not available, report reviewed: imaging studies available  Required items: required blood products, implants, devices, and special equipment available  Patient identity confirmed: verbally with patient and provided demographic data        Local anesthesia used: no     Anesthesia   Local anesthesia used: no     Sedation   Patient sedated: no        Specimen: no    Culture: no   Procedure Details   Procedure Notes: Office TRUS    Indication    persistent LUTS    Informed consent   The risks, benefits and alternatives to this procedures were discussed with the patient and he has participated in the informed consent process and wishes to proceed    Antibiotic prophylaxis   Ceftriaxone    Rectal cleansing  The patient was instructed to perform an evacuating rectal enema 1-2 hours prior to biopsy. Local anesthesia  Topical 2% lidocaine jelly was applied liberally to the anus and rectum and allowed to dwell for at least 5 min prior to starting the procedure. After insertion of the TRUS probe, 10 mL of 2% lidocaine solution was injected with ultrasound guidance at the  junction of the prostate and seminal vesicles. The anesthetic was allowed to dwell for at least 2 minutes prior to biopsy. Transrectal ultrasonography  The patient was placed in the left lateral decubitus position.  After an attentive digital rectal examination, a 7.5 mHz sidefire ultrasound probe was gently inserted into the rectum and biplanar imaging of the prostate was done with the findings noted below. Images were taken of any abnormal findings and also to document prostate size. Bladder  The bladder base appeared normal.    Prostate      Ultrasound size measurements:  -Volume:  40.8 cm3    Ultrasound findings:  -Cysts: None  -Masses: None  -Median lobe: absent                  Complications  There were no procedural complications. Disposition  The patient was dismissed to home     Post-procedure instructions:      Today he underwent an uncomplicated transrectal ultrasound showing a 40 gram gland and no intravesical prostatic protrusion, there is tightness at the bladder neck  Patient Transportation: confirmed  Patient tolerance: patient tolerated the procedure well with no immediate complications

## 2023-08-22 NOTE — TELEPHONE ENCOUNTER
Pt called stated that 11/2 works for him and he requested early appt     Pt also requested transportation    Please review     I'm unable to schedule the appt that day.

## 2023-08-22 NOTE — PROGRESS NOTES
Office Cystoscopy Procedure Note    Indication:     medically refractory lower urinary tract symptoms     Informed consent   The risks, benefits, complications, treatment options, and expected outcomes were discussed with the patient. The patient concurred with the proposed plan and provided informed consent. Anesthesia  Lidocaine jelly 2%    Antibiotic prophylaxis   None    Procedure  The patient was placed in the supineposition, was prepped and draped in the usual manner using sterile technique, and 2% lidocaine jelly instilled into the urethra. A 17 F flexible cystoscope was then inserted into the urethra and the urethra and bladder carefully examined. The following findings were noted:    Findings:  Urethra:  Normal  Prostate:  Anterior channel via prostate noted from urolift, no hardware visualized, moderate lateral lobe hypertrophy, no median lobe, no lesions  Bladder:  Tightness at the bladder neck noted, no tumors, no exposed capsular tabs in the bladder  Ureteral orifices:  orthotopic  Other findings:  None, retroflexed view confirms    Specimens: None                 Complications:    None; patient tolerated the procedure well           Disposition: To home            Condition: Stable    Plan: Channel noted from previous urolift, tightness at the bladder neck noted, no exposed hardware noted       Cystoscopy     Date/Time 8/22/2023 9:30 AM     Performed by  Sandy Tavera MD   Authorized by Sandy Tavera MD     Universal Protocol:  Consent: Verbal consent obtained. Written consent obtained.   Risks and benefits: risks, benefits and alternatives were discussed  Consent given by: patient  Patient understanding: patient states understanding of the procedure being performed  Patient consent: the patient's understanding of the procedure matches consent given  Procedure consent: procedure consent matches procedure scheduled  Relevant documents: relevant documents present and verified  Test results: test results available and properly labeled  Site marked: the operative site was not marked  Radiology Images displayed and confirmed. If images not available, report reviewed: imaging studies available  Required items: required blood products, implants, devices, and special equipment available  Patient identity confirmed: verbally with patient and provided demographic data        Procedure Details:  Procedure type: cystoscopy    Patient tolerance: Patient tolerated the procedure well with no immediate complications    Additional Procedure Details: Office Cystoscopy Procedure Note    Indication:     medically refractory lower urinary tract symptoms     Informed consent   The risks, benefits, complications, treatment options, and expected outcomes were discussed with the patient. The patient concurred with the proposed plan and provided informed consent. Anesthesia  Lidocaine jelly 2%    Antibiotic prophylaxis   None    Procedure  The patient was placed in the supineposition, was prepped and draped in the usual manner using sterile technique, and 2% lidocaine jelly instilled into the urethra. A 17 F flexible cystoscope was then inserted into the urethra and the urethra and bladder carefully examined.   The following findings were noted:    Findings:  Urethra:  Normal  Prostate:  Anterior channel via prostate noted from urolift, no hardware visualized, moderate lateral lobe hypertrophy, no median lobe, no lesions  Bladder:  Tightness at the bladder neck noted, no tumors, no exposed capsular tabs in the bladder  Ureteral orifices:  orthotopic  Other findings:  None, retroflexed view confirms    Specimens: None                 Complications:    None; patient tolerated the procedure well           Disposition: To home            Condition: Stable    Plan: Channel noted from previous urolift, tightness at the bladder neck noted, no exposed hardware noted

## 2023-08-22 NOTE — TELEPHONE ENCOUNTER
Patient needs urodynamic studies done and he does not drive. I told patient these studies are only done at [facility] or [facility] but patient cannot make it to either of those offices since he does not have transport. Patient is a patient in the [facility] office. Can transportation be arranged for him for this test and follow up after? Please advise. Thank you! Provider note:      Return for schedule urodynamic studies.

## 2023-08-22 NOTE — TELEPHONE ENCOUNTER
Called pt to set up appt for study, he said he was on the bus and could not talk. Stated he will call back to set up his appt for the urodynamic appt. Noted the appts are out in November at the [place] office, and that we can set up a ride to and from his appt.

## 2023-11-02 ENCOUNTER — PROCEDURE VISIT (OUTPATIENT)
Dept: UROLOGY | Facility: CLINIC | Age: 63
End: 2023-11-02
Payer: COMMERCIAL

## 2023-11-02 VITALS
RESPIRATION RATE: 18 BRPM | DIASTOLIC BLOOD PRESSURE: 86 MMHG | HEART RATE: 90 BPM | HEIGHT: 70 IN | OXYGEN SATURATION: 97 % | BODY MASS INDEX: 31.78 KG/M2 | WEIGHT: 222 LBS | SYSTOLIC BLOOD PRESSURE: 126 MMHG

## 2023-11-02 DIAGNOSIS — R35.1 BENIGN PROSTATIC HYPERPLASIA WITH NOCTURIA: ICD-10-CM

## 2023-11-02 DIAGNOSIS — N40.1 BENIGN PROSTATIC HYPERPLASIA WITH NOCTURIA: ICD-10-CM

## 2023-11-02 DIAGNOSIS — N39.41 URGE INCONTINENCE: Primary | ICD-10-CM

## 2023-11-02 DIAGNOSIS — N32.81 DETRUSOR INSTABILITY: ICD-10-CM

## 2023-11-02 LAB
SL AMB  POCT GLUCOSE, UA: NORMAL
SL AMB LEUKOCYTE ESTERASE,UA: NORMAL
SL AMB POCT BILIRUBIN,UA: NORMAL
SL AMB POCT BLOOD,UA: NORMAL
SL AMB POCT CLARITY,UA: CLEAR
SL AMB POCT COLOR,UA: YELLOW
SL AMB POCT KETONES,UA: NORMAL
SL AMB POCT NITRITE,UA: NORMAL
SL AMB POCT PH,UA: 5
SL AMB POCT SPECIFIC GRAVITY,UA: 1.01
SL AMB POCT URINE PROTEIN: NORMAL
SL AMB POCT UROBILINOGEN: 0.2

## 2023-11-02 PROCEDURE — 81002 URINALYSIS NONAUTO W/O SCOPE: CPT

## 2023-11-02 PROCEDURE — 51728 CYSTOMETROGRAM W/VP: CPT

## 2023-11-02 PROCEDURE — 51797 INTRAABDOMINAL PRESSURE TEST: CPT

## 2023-11-02 PROCEDURE — 51784 ANAL/URINARY MUSCLE STUDY: CPT

## 2023-11-02 RX ORDER — EZETIMIBE 10 MG/1
10 TABLET ORAL DAILY
COMMUNITY
Start: 2023-10-06 | End: 2024-10-05

## 2023-11-02 RX ORDER — OMEPRAZOLE 20 MG/1
CAPSULE, DELAYED RELEASE ORAL
COMMUNITY
Start: 2023-07-31

## 2023-11-02 NOTE — PROGRESS NOTES
CC: "I can't hold my urine very well and I only go a little bit and I can only hold an ounce or 2. I have a hard time starting my stream"    Has sense of incomplete emptying  Denies pain /burning with voiding    Voided 12 ml prior to test with PVR of 30 ml    CMG:       Position:  sitting          First urge:          14 ml,     pdet   2         Normal urge:    17 ml,     pdet  2          Must urge:      20   ml,     pdet  30       Permission to void:     21     ml,     pdet 33          Max pdet during void    38 cm H2O       Voided volume:    7 ml    Bladder stability:      unstable at  12 ml with leak with first fill and + DI at 17 ml with leakage with                         2nd fill    Compliance:  normal         EMG activity:       Normal during filling,        normal during voiding    Comments:  Sensory urgency  + DI with leak and void, fill then restarted with decreased rate of 30cc/min and after 17 ml again had DI with leak and void. Urodynamics    Date/Time: 11/2/2023 1:00 PM    Performed by: Jayde Martinez RN  Authorized by: Stephanie Meng MD  Universal Protocol:  Consent: Verbal consent obtained. Written consent obtained.   Risks and benefits: risks, benefits and alternatives were discussed  Consent given by: patient  Patient understanding: patient states understanding of the procedure being performed  Patient consent: the patient's understanding of the procedure matches consent given  Procedure consent: procedure consent matches procedure scheduled  Patient identity confirmed: verbally with patient  Procedure - Urodynamics:  Procedure details: CMG and EMG      Voiding Pressure Study: Yes    Intra-abdominal Voiding Pressure Study: Yes    Post-procedure:     Patient tolerance: Patient tolerated procedure well with no immediate complications

## 2023-11-09 ENCOUNTER — TELEPHONE (OUTPATIENT)
Dept: UROLOGY | Facility: CLINIC | Age: 63
End: 2023-11-09

## 2023-11-09 NOTE — TELEPHONE ENCOUNTER
Pt came in for appt with  was late checking in for his appt due to [id number] and [employer]. . marvin could not see that pt and he needed to be R/S. Marvin has no appts in [facility] until Jan 2024, Pt was extreamly upset and wanted an appt asap with whoever. I offered him an appt on 11/9 in [facility]) at [address] with Marvin, but he does not drive and does not want to get up that early for an appt. He is now scheduled with Zeinab on 12/12 @ 10am.    We offered for CJ to come and pick the PT up for his appt on 12/12. .. We will move this Encounter out too the 1st week in December for us to call and set up CJ and then will call the PT with his  time . ...

## 2023-12-12 ENCOUNTER — OFFICE VISIT (OUTPATIENT)
Dept: UROLOGY | Facility: CLINIC | Age: 63
End: 2023-12-12
Payer: COMMERCIAL

## 2023-12-12 ENCOUNTER — TELEPHONE (OUTPATIENT)
Dept: UROLOGY | Facility: CLINIC | Age: 63
End: 2023-12-12

## 2023-12-12 VITALS
HEIGHT: 70 IN | HEART RATE: 105 BPM | SYSTOLIC BLOOD PRESSURE: 104 MMHG | WEIGHT: 221.8 LBS | BODY MASS INDEX: 31.75 KG/M2 | DIASTOLIC BLOOD PRESSURE: 68 MMHG | OXYGEN SATURATION: 98 %

## 2023-12-12 DIAGNOSIS — N32.81 OAB (OVERACTIVE BLADDER): ICD-10-CM

## 2023-12-12 DIAGNOSIS — Z12.5 PROSTATE CANCER SCREENING: ICD-10-CM

## 2023-12-12 DIAGNOSIS — N32.0 BLADDER-NECK OBSTRUCTION: ICD-10-CM

## 2023-12-12 LAB — POST-VOID RESIDUAL VOLUME, ML POC: 41 ML

## 2023-12-12 PROCEDURE — 99214 OFFICE O/P EST MOD 30 MIN: CPT | Performed by: UROLOGY

## 2023-12-12 PROCEDURE — 51798 US URINE CAPACITY MEASURE: CPT | Performed by: UROLOGY

## 2023-12-12 NOTE — TELEPHONE ENCOUNTER
German Gee or feb 2024 bladder botox  Needs LYFT  for 4/17/24 or earlier if we get an earlier appt   [facility] for ordering please

## 2023-12-12 NOTE — LETTER
December 12, 2023     [address]  [address] [facility]    Patient: Bijan Pritchard   YOB: 1960   Date of Visit: 12/12/2023       Dear Dr. Skip Klein:    Thank you for referring Bijan Pritchard to me for evaluation. Below are my notes for this consultation. If you have questions, please do not hesitate to call me. I look forward to following your patient along with you. Sincerely,        Arnaldo Cannon DO        CC: No Recipients    John Baker  12/12/2023  3:37 PM  Sign when Signing Visit  [address] is a 58 y.o. male with voiding issues    No anticoagulation    Urolift 2019    LUTS: urgency, freq    Failed oxybutynin for OAB sx    TRUS 8/22/23: 40 g prostate    Cysto 8/22/23: relatively open prostate channel but tight bladder neck    UDS 11/2/23:  Voided 12 ml prior to test with PVR of 30 ml     CMG:       Position:  sitting          First urge:          14 ml,     pdet   2         Normal urge:    17 ml,     pdet  2          Must urge:      20   ml,     pdet  30       Permission to void:     21     ml,     pdet 33          Max pdet during void    38 cm H2O       Voided volume:    7 ml     Bladder stability:      unstable at  12 ml with leak with first fill and + DI at 17 ml with leakage with                         2nd fill     Compliance:  normal         EMG activity:       Normal during filling,        normal during voiding     Comments:  Sensory urgency  + DI with leak and void, fill then restarted with decreased rate of 30cc/min and after 17 ml again had DI with leak and void. Random bladder scan 41 cc on 12/12/23    Assessment and plan:     OAB    56-year-old male with history of lower urinary tract symptoms for multiple years. He previously had an obstructed outlet which was addressed with UroLift in 2019.   After the procedure, he continued to have overactive bladder symptoms urgency and frequency. He had a prostate ultrasound which showed 40 g prostate in August 2023. Cystoscopy at that time demonstrated relatively open prostate channel but a tight bladder neck. He had urodynamics testing on 11/2/2023 in the office. I interpreted the test and reviewed the results with the patient today. I explained that this testing demonstrates that his bladder goes into detrusor overactivity and causes leakage with relatively small bladder volumes. I explained that this is consistent with his overactive bladder symptoms. He has already failed oral therapy this (he failed oxybutynin). His random bladder scan in the office was 41 cc. I explained that this means that he empties his bladder overall well. I explained that his next options for his overactive bladder would be either bladder Botox or sacral neuromodulation. We did briefly discussed each procedure. He prefers to be more conservative at this point and try the bladder Botox first in the office. I believe that this is a reasonable plan. Patient was consented in the office today for cystoscopy, intradetrusor injection of onabotulinum toxin A 100 units. I reviewed the procedure in detail. I explained that the procedure would be done in the office. I explained that we will perform a cystoscopy which involved placing a flexible camera into urethra and bladder. Once inside the bladder, a flexible needle would be placed through the scope so that it can be visualized on the screen. The needle would be then injected into 10 separate sites on the posterior wall of the bladder into the muscle layer. On each site, we would give about 1 cc of a mixture  which consisted of 100 units of open of botulinum toxin a diluted in 10 cc of normal saline. I explained that the basic risks of the procedure included infection, bleeding, urinary retention.   I explained to the patient that if the patient does develop urinary retention after the procedure, patient would need to call the office or present to the ED for Segundo catheter placement. I explained that it would often take a couple of days, or even weeks for ability to void spontaneously to come back. I explained to the patient that having some blood in the urine after the procedure was normal, but if large blood clots were seen or if there was inability to urinate due to blood clots forming, office need to be called or an ED visit would need to be arranged. Regarding infection, I did explain to the patient that I often send patients on prophylactic antibiotics after the procedure to prevent infection. Additionally, I asked patient to look out for concerning infection symptoms including but not limited to fevers, chills, abdominal pain, flank pain. I explained that    I explained that the effects of Botox therapy are not permanent and that the procedure often needs to be repeated every 6 to 9 months. I also explained that there are maximum doses of systemic Botox that are safe for the body. Typically, it is not recommended that patients exceed more than 400 units in 4 months. I therefore explained that additional Botox procedures such as cosmetic procedures for wrinkles, injections for headaches, and injections for muscle spasms would therefore need to be avoided if patient was concurrently undergoing bladder Botox therapy. I did explain to the patient that if any of these additional injections were needed in the future, discussion would need to be had in the office with the urology team.    I confirmed with patient that there was no previous history of Botox injections. I also explained to the patient that maximum symptomatic improvement should be expected to take about 4 weeks for after Botox injection therapy. Given the discussion of the risks and benefits of the procedure, patient agreed to proceed with scheduling for the bladder Botox therapy.         Prostate cancer screening    PSA under 4 in August 2023    Due for next PSA summer 2024 (PSA ordered for this visit)        PLAN  -Bladder Botox 100 units in Jan or Feb 2024  -PSA summer 2024      In our clinic encounter today we:  -Reviewed the following in-office tests: bladder scan  -I personally interpreted the following procedure and reviewed with patient: urodynamics  -I ordered the following outpatient blood/urine tests: PSA  -Consented for the following procedures: bladder botox              Portions of the above record have been created with voice recognition software. Occasional wrong word or "sound alike" substitution may have occurred due to the inherent limitations of voice recognition software. Read the chart carefully and recognize, using context, where substitution may have occurred.       Solmon Hemp, DO        Chief Complaint     OAB      History of Present Illness     See summary above        The following portions of the patient's history were reviewed and updated as appropriate: allergies, current medications, past family history, past medical history, past social history, past surgical history and problem list.        AUA SYMPTOM SCORE      Flowsheet Row Most Recent Value   AUA SYMPTOM SCORE    How often have you had a sensation of not emptying your bladder completely after you finished urinating? 5 (P)     How often have you had to urinate again less than two hours after you finished urinating? 4 (P)     How often have you found you stopped and started again several times when you urinate? 5 (P)     How often have you found it difficult to postpone urination? 3 (P)     How often have you had a weak urinary stream? 5 (P)     How often have you had to push or strain to begin urination? 2 (P)     How many times did you most typically get up to urinate from the time you went to bed at night until the time you got up in the morning? 5 (P)     Quality of Life: If you were to spend the rest of your life with your urinary condition just the way it is now, how would you feel about that? 6 (P)     AUA SYMPTOM SCORE 29 (P)               Review of Systems     Review of Systems   Constitutional:  Negative for chills and fever. Respiratory:  Negative for cough and shortness of breath. Gastrointestinal:  Negative for abdominal pain. Genitourinary:  Negative for hematuria and testicular pain. Neurological:  Negative for dizziness and headaches. Psychiatric/Behavioral:  Negative for agitation and behavioral problems. Allergies     Allergies   Allergen Reactions   • Meloxicam Other (See Comments)     Anemia could be related! • Aspirin      Stomach pain     • Halobetasol Hallucinations   • Olanzapine Hallucinations   • Milk (Cow) Diarrhea       Physical Exam     Physical Exam  Constitutional:       General: He is not in acute distress. HENT:      Head: Normocephalic and atraumatic. Pulmonary:      Effort: Pulmonary effort is normal. No respiratory distress. Abdominal:      General: Abdomen is flat. Palpations: Abdomen is soft. Tenderness: There is no right CVA tenderness or left CVA tenderness. Skin:     General: Skin is warm and dry. Neurological:      General: No focal deficit present. Mental Status: He is alert and oriented to person, place, and time.    Psychiatric:         Mood and Affect: Mood normal.         Behavior: Behavior normal.             Vital Signs  Vitals:    12/12/23 1007   BP: 104/68   Pulse: 105   SpO2: 98%   Weight: 101 kg (221 lb 12.8 oz)   Height: 5' 10" (1.778 m)         Current Medications       Current Outpatient Medications:   •  busPIRone (BUSPAR) 10 mg tablet, Take by mouth 3 (three) times a day , Disp: , Rfl:   •  coal tar-salicylic acid 1 % shampoo, Apply 1 Application topically daily, Disp: , Rfl:   •  cyclobenzaprine (FLEXERIL) 10 mg tablet, 10 mg daily at bedtime , Disp: , Rfl:   •  ezetimibe (ZETIA) 10 mg tablet, Take 10 mg by mouth daily, Disp: , Rfl:   •  ibuprofen (MOTRIN) 600 mg tablet, ibuprofen 600 mg tablet, Disp: , Rfl:   •  levothyroxine 100 mcg tablet, levothyroxine 100 mcg tablet, Disp: , Rfl:   •  omeprazole (PriLOSEC) 20 mg delayed release capsule, , Disp: , Rfl:   •  venlafaxine (EFFEXOR-XR) 150 mg 24 hr capsule, Take 150 mg by mouth daily, Disp: , Rfl:       Active Problems     Patient Active Problem List   Diagnosis   • Benign prostatic hyperplasia with nocturia   • Anxiety disorder   • Hypothyroidism   • OAB (overactive bladder)   • Symptomatic anemia   • Hyponatremia   • Muscle spasm   • Lower extremity edema   • Bruising   • Hiatal hernia   • Encounter to discuss test results   • Muscle weakness         Past Medical History     Past Medical History:   Diagnosis Date   • Anxiety    • Arthritis    • Colon polyp    • Disease of thyroid gland    • Hashimoto thyroiditis, fibrous variant    • Irritable bowel syndrome    • Muscle weakness    • Shortness of breath          Surgical History     Past Surgical History:   Procedure Laterality Date   • COLONOSCOPY     • HERNIA REPAIR  2003   • CO CYSTO INSERTION TRANSPROSTATIC IMPLANT SINGLE N/A 11/7/2019    Procedure: Venora Skiff;  Surgeon: Js Long MD;  Location: [facility];  Service: Urology         Family History     Family History   Problem Relation Age of Onset   • No Known Problems Father    • No Known Problems Mother    • No Known Problems Half-Sister    • No Known Problems Half-Brother          Social History     Social History    Social History     Tobacco Use   Smoking Status Former   • Types: Cigarettes   • Quit date: 2008   • Years since quitting: 15.9   • Passive exposure: Past   Smokeless Tobacco Never         Pertinent Lab Values     Lab Results   Component Value Date    CREATININE 1.04 01/13/2022       Lab Results   Component Value Date    PSA 3.78 08/16/2023         Pertinent Imaging     N/A      Pertinent Pathology     N/A

## 2023-12-12 NOTE — PROGRESS NOTES
UROLOGY FOLLOW-UP ENCOUNTER    Lenette Skiff is a 58 y.o. male with voiding issues    No anticoagulation    Urolift 2019    LUTS: urgency, freq    Failed oxybutynin for OAB sx    TRUS 8/22/23: 40 g prostate    Cysto 8/22/23: relatively open prostate channel but tight bladder neck    UDS 11/2/23:  Voided 12 ml prior to test with PVR of 30 ml     CMG:       Position:  sitting          First urge:          14 ml,     pdet   2         Normal urge:    17 ml,     pdet  2          Must urge:      20   ml,     pdet  30       Permission to void:     21     ml,     pdet 33          Max pdet during void    38 cm H2O       Voided volume:    7 ml     Bladder stability:      unstable at  12 ml with leak with first fill and + DI at 17 ml with leakage with                         2nd fill     Compliance:  normal         EMG activity:       Normal during filling,        normal during voiding     Comments:  Sensory urgency  + DI with leak and void, fill then restarted with decreased rate of 30cc/min and after 17 ml again had DI with leak and void. Random bladder scan 41 cc on 12/12/23    Assessment and plan:     OAB    59-year-old male with history of lower urinary tract symptoms for multiple years. He previously had an obstructed outlet which was addressed with UroLift in 2019. After the procedure, he continued to have overactive bladder symptoms urgency and frequency. He had a prostate ultrasound which showed 40 g prostate in August 2023. Cystoscopy at that time demonstrated relatively open prostate channel but a tight bladder neck. He had urodynamics testing on 11/2/2023 in the office. I interpreted the test and reviewed the results with the patient today. I explained that this testing demonstrates that his bladder goes into detrusor overactivity and causes leakage with relatively small bladder volumes. I explained that this is consistent with his overactive bladder symptoms.     He has already failed oral therapy this (he failed oxybutynin). His random bladder scan in the office was 41 cc. I explained that this means that he empties his bladder overall well. I explained that his next options for his overactive bladder would be either bladder Botox or sacral neuromodulation. We did briefly discussed each procedure. He prefers to be more conservative at this point and try the bladder Botox first in the office. I believe that this is a reasonable plan. Patient was consented in the office today for cystoscopy, intradetrusor injection of onabotulinum toxin A 100 units. I reviewed the procedure in detail. I explained that the procedure would be done in the office. I explained that we will perform a cystoscopy which involved placing a flexible camera into urethra and bladder. Once inside the bladder, a flexible needle would be placed through the scope so that it can be visualized on the screen. The needle would be then injected into 10 separate sites on the posterior wall of the bladder into the muscle layer. On each site, we would give about 1 cc of a mixture  which consisted of 100 units of open of botulinum toxin a diluted in 10 cc of normal saline. I explained that the basic risks of the procedure included infection, bleeding, urinary retention. I explained to the patient that if the patient does develop urinary retention after the procedure, patient would need to call the office or present to the ED for Segundo catheter placement. I explained that it would often take a couple of days, or even weeks for ability to void spontaneously to come back. I explained to the patient that having some blood in the urine after the procedure was normal, but if large blood clots were seen or if there was inability to urinate due to blood clots forming, office need to be called or an ED visit would need to be arranged.   Regarding infection, I did explain to the patient that I often send patients on prophylactic antibiotics after the procedure to prevent infection. Additionally, I asked patient to look out for concerning infection symptoms including but not limited to fevers, chills, abdominal pain, flank pain. I explained that    I explained that the effects of Botox therapy are not permanent and that the procedure often needs to be repeated every 6 to 9 months. I also explained that there are maximum doses of systemic Botox that are safe for the body. Typically, it is not recommended that patients exceed more than 400 units in 4 months. I therefore explained that additional Botox procedures such as cosmetic procedures for wrinkles, injections for headaches, and injections for muscle spasms would therefore need to be avoided if patient was concurrently undergoing bladder Botox therapy. I did explain to the patient that if any of these additional injections were needed in the future, discussion would need to be had in the office with the urology team.    I confirmed with patient that there was no previous history of Botox injections. I also explained to the patient that maximum symptomatic improvement should be expected to take about 4 weeks for after Botox injection therapy. Given the discussion of the risks and benefits of the procedure, patient agreed to proceed with scheduling for the bladder Botox therapy. Prostate cancer screening    PSA under 4 in August 2023    Due for next PSA summer 2024 (PSA ordered for this visit)        PLAN  -Bladder Botox 100 units in Jan or Feb 2024  -PSA summer 2024      In our clinic encounter today we:  -Reviewed the following in-office tests: bladder scan  -I personally interpreted the following procedure and reviewed with patient: urodynamics  -I ordered the following outpatient blood/urine tests: PSA  -Consented for the following procedures: bladder botox              Portions of the above record have been created with voice recognition software.   Occasional wrong word or "sound alike" substitution may have occurred due to the inherent limitations of voice recognition software. Read the chart carefully and recognize, using context, where substitution may have occurred. Miguel Danielle DO        Chief Complaint     OAB      History of Present Illness     See summary above        The following portions of the patient's history were reviewed and updated as appropriate: allergies, current medications, past family history, past medical history, past social history, past surgical history and problem list.        AUA SYMPTOM SCORE      Flowsheet Row Most Recent Value   AUA SYMPTOM SCORE    How often have you had a sensation of not emptying your bladder completely after you finished urinating? 5 (P)     How often have you had to urinate again less than two hours after you finished urinating? 4 (P)     How often have you found you stopped and started again several times when you urinate? 5 (P)     How often have you found it difficult to postpone urination? 3 (P)     How often have you had a weak urinary stream? 5 (P)     How often have you had to push or strain to begin urination? 2 (P)     How many times did you most typically get up to urinate from the time you went to bed at night until the time you got up in the morning? 5 (P)     Quality of Life: If you were to spend the rest of your life with your urinary condition just the way it is now, how would you feel about that? 6 (P)     AUA SYMPTOM SCORE 29 (P)               Review of Systems     Review of Systems   Constitutional:  Negative for chills and fever. Respiratory:  Negative for cough and shortness of breath. Gastrointestinal:  Negative for abdominal pain. Genitourinary:  Negative for hematuria and testicular pain. Neurological:  Negative for dizziness and headaches. Psychiatric/Behavioral:  Negative for agitation and behavioral problems.             Allergies     Allergies   Allergen Reactions Meloxicam Other (See Comments)     Anemia could be related! Aspirin      Stomach pain      Halobetasol Hallucinations    Olanzapine Hallucinations    Milk (Cow) Diarrhea       Physical Exam     Physical Exam  Constitutional:       General: He is not in acute distress. HENT:      Head: Normocephalic and atraumatic. Pulmonary:      Effort: Pulmonary effort is normal. No respiratory distress. Abdominal:      General: Abdomen is flat. Palpations: Abdomen is soft. Tenderness: There is no right CVA tenderness or left CVA tenderness. Skin:     General: Skin is warm and dry. Neurological:      General: No focal deficit present. Mental Status: He is alert and oriented to person, place, and time.    Psychiatric:         Mood and Affect: Mood normal.         Behavior: Behavior normal.             Vital Signs  Vitals:    12/12/23 1007   BP: 104/68   Pulse: 105   SpO2: 98%   Weight: 101 kg (221 lb 12.8 oz)   Height: 5' 10" (1.778 m)         Current Medications       Current Outpatient Medications:     busPIRone (BUSPAR) 10 mg tablet, Take by mouth 3 (three) times a day , Disp: , Rfl:     coal tar-salicylic acid 1 % shampoo, Apply 1 Application topically daily, Disp: , Rfl:     cyclobenzaprine (FLEXERIL) 10 mg tablet, 10 mg daily at bedtime , Disp: , Rfl:     ezetimibe (ZETIA) 10 mg tablet, Take 10 mg by mouth daily, Disp: , Rfl:     ibuprofen (MOTRIN) 600 mg tablet, ibuprofen 600 mg tablet, Disp: , Rfl:     levothyroxine 100 mcg tablet, levothyroxine 100 mcg tablet, Disp: , Rfl:     omeprazole (PriLOSEC) 20 mg delayed release capsule, , Disp: , Rfl:     venlafaxine (EFFEXOR-XR) 150 mg 24 hr capsule, Take 150 mg by mouth daily, Disp: , Rfl:       Active Problems     Patient Active Problem List   Diagnosis    Benign prostatic hyperplasia with nocturia    Anxiety disorder    Hypothyroidism    OAB (overactive bladder)    Symptomatic anemia    Hyponatremia    Muscle spasm    Lower extremity edema    Bruising Hiatal hernia    Encounter to discuss test results    Muscle weakness         Past Medical History     Past Medical History:   Diagnosis Date    Anxiety     Arthritis     Colon polyp     Disease of thyroid gland     Hashimoto thyroiditis, fibrous variant     Irritable bowel syndrome     Muscle weakness     Shortness of breath          Surgical History     Past Surgical History:   Procedure Laterality Date    COLONOSCOPY      HERNIA REPAIR  2003    [place] CYSTO INSERTION TRANSPROSTATIC IMPLANT SINGLE N/A 11/7/2019    Procedure: Lu Stauffer;  Surgeon: Kriss White MD;  Location: [facility];  Service: Urology         Family History     Family History   Problem Relation Age of Onset    No Known Problems Father     No Known Problems Mother     No Known Problems Half-Sister     No Known Problems Half-Brother          Social History     Social History     Social History     Tobacco Use   Smoking Status Former    Types: Cigarettes    Quit date: 2008    Years since quitting: 15.9    Passive exposure: Past   Smokeless Tobacco Never         Pertinent Lab Values     Lab Results   Component Value Date    CREATININE 1.04 01/13/2022       Lab Results   Component Value Date    PSA 3.78 08/16/2023         Pertinent Imaging     N/A      Pertinent Pathology     N/A

## 2023-12-13 ENCOUNTER — TELEPHONE (OUTPATIENT)
Dept: NEUROLOGY | Facility: CLINIC | Age: 63
End: 2023-12-13

## 2023-12-13 NOTE — TELEPHONE ENCOUNTER
This patient called in looking for an appointment with our movement specialist     After triage questions it was determined that they needed to see the movement specialist    But the patient was only interested in going to the [facility] office     1st available was 1/2025 and patient did not want that    Offered to get scheduled and place on wait list patient declined at this time and will look at other options     Patient does not drive and looking to be seen in [facility] with the help of a lyft program     Sent referral message to Dr. Ace Rivera to make a decision for scheduling

## 2023-12-15 ENCOUNTER — TELEPHONE (OUTPATIENT)
Dept: NEUROLOGY | Facility: CLINIC | Age: 63
End: 2023-12-15

## 2023-12-15 NOTE — TELEPHONE ENCOUNTER
LAVERNE called patient at [phone]. Patient is active with [facility] however they do not do out of county trips at this time. Patient has transportation benefits through his insurance. Jennifer Erwindanae will reimburse him anywhere b/w 400-500 dollars per year for transportation. The round trip cost for an uber/lyft is aprx 200 dollars. Patient is prepared to pay for transportation and then request reimbursement. Patient to call insurance and then update this writer. Is there a movement doctor in [place] location by chance or something closer for the patient than [facility] that isn't as far off as 2025? Can patient see a generalist?  Not sure if this is possible. The problem is patient does not qualify for a Lyft but it would be nice if he didn't have to go so far to pay so much for his appointment. Please advise and thank you in advance.

## 2023-12-15 NOTE — TELEPHONE ENCOUNTER
Patient lives in [facility] and does not drive  Patient has used the lyft to get to Urology appointments here in [place] ([place])       Patient was scheduled for 1/30/24 in our CV office and they are asking if they can again be provided a LYFT to attend this NP consultation     Can you assist    Thank you!      Patient contact # [phone]

## 2024-01-19 ENCOUNTER — TELEPHONE (OUTPATIENT)
Dept: NEUROLOGY | Facility: CLINIC | Age: 64
End: 2024-01-19

## 2024-01-25 ENCOUNTER — OFFICE VISIT (OUTPATIENT)
Dept: NEUROLOGY | Facility: CLINIC | Age: 64
End: 2024-01-25
Payer: COMMERCIAL

## 2024-01-25 VITALS
WEIGHT: 231.6 LBS | DIASTOLIC BLOOD PRESSURE: 85 MMHG | SYSTOLIC BLOOD PRESSURE: 137 MMHG | HEART RATE: 105 BPM | TEMPERATURE: 98.1 F | BODY MASS INDEX: 33.23 KG/M2 | OXYGEN SATURATION: 98 %

## 2024-01-25 DIAGNOSIS — E53.8 DISORDER OF VITAMIN B12: ICD-10-CM

## 2024-01-25 DIAGNOSIS — R27.0 ATAXIA: ICD-10-CM

## 2024-01-25 DIAGNOSIS — M62.81 MUSCLE WEAKNESS: Primary | ICD-10-CM

## 2024-01-25 DIAGNOSIS — E03.9 HYPOTHYROIDISM, UNSPECIFIED TYPE: ICD-10-CM

## 2024-01-25 PROCEDURE — 99204 OFFICE O/P NEW MOD 45 MIN: CPT | Performed by: PSYCHIATRY & NEUROLOGY

## 2024-01-25 NOTE — ASSESSMENT & PLAN NOTE
Mr Purcell has numerous complaints; I am not sure if he has a unifying diagnosis or whether multiple conditions might be at play.  His most chronic condition appears to be gastrointestinal.  Given his chronic diarrhea, nutritional deficiency is a consideration.  On examination he has mild proximal muscle weakness with increased patellar reflexes, a Yoni sign, and borderline Babinski's sign.  B12 deficiency with subsequent medical myelopathy is a consideration although he does not have other obvious signs to suggest myelopathy.  Copper deficiency is also a consideration.  It is also possible that he has more than 1 condition.  I doubt that he has a lifelong history of muscle disease but will obtain a screening CPK level.  I do not think that he needs electrodiagnostic testing at this point but would consider based on the clinical course and test results.  It is perhaps possible that he has hypothalamic disease so I am going to obtain an MRI of the brain to make sure he has no subtle structural abnormality in that region.  However, it is certainly possible that no diagnosis will be reached.  He understands and has been frustrated with this although he understands that not having a diagnosis is not the worst thing in the world compared with some other possibilities.  Under those circumstances, symptomatic treatment would be the next step.  Obviously, this would be be considered based on his test results and clinical course.

## 2024-01-25 NOTE — PROGRESS NOTES
"Please note: this note was created with voice recognition software. Occasional wrong word or \"sound alike\" substitutions may occur due to the inherent limitations of voice recognition software. Read the chart carefully and recognize (using context) where substitutions may have occurred. I am available to discuss any questions.       1. Muscle weakness  Assessment & Plan:  Mr Purcell has numerous complaints; I am not sure if he has a unifying diagnosis or whether multiple conditions might be at play.  His most chronic condition appears to be gastrointestinal.  Given his chronic diarrhea, nutritional deficiency is a consideration.  On examination he has mild proximal muscle weakness with increased patellar reflexes, a Yoni sign, and borderline Babinski's sign.  B12 deficiency with subsequent medical myelopathy is a consideration although he does not have other obvious signs to suggest myelopathy.  Copper deficiency is also a consideration.  It is also possible that he has more than 1 condition.  I doubt that he has a lifelong history of muscle disease but will obtain a screening CPK level.  I do not think that he needs electrodiagnostic testing at this point but would consider based on the clinical course and test results.  It is perhaps possible that he has hypothalamic disease so I am going to obtain an MRI of the brain to make sure he has no subtle structural abnormality in that region.  However, it is certainly possible that no diagnosis will be reached.  He understands and has been frustrated with this although he understands that not having a diagnosis is not the worst thing in the world compared with some other possibilities.  Under those circumstances, symptomatic treatment would be the next step.  Obviously, this would be be considered based on his test results and clinical course.    Orders:  -     CK; Future    2. Hypothyroidism, unspecified type  -     TSH w/Reflex; Future    3. Ataxia  -     MRI brain " without contrast; Future; Expected date: 01/25/2024    4. Disorder of vitamin B12  -     Vitamin B12; Future  -     Methylmalonic Acid and Homocysteine; Future        Problem List Items Addressed This Visit       Hypothyroidism    Relevant Orders    TSH w/Reflex    Muscle weakness - Primary     Mr Purcell has numerous complaints; I am not sure if he has a unifying diagnosis or whether multiple conditions might be at play.  His most chronic condition appears to be gastrointestinal.  Given his chronic diarrhea, nutritional deficiency is a consideration.  On examination he has mild proximal muscle weakness with increased patellar reflexes, a Yoni sign, and borderline Babinski's sign.  B12 deficiency with subsequent medical myelopathy is a consideration although he does not have other obvious signs to suggest myelopathy.  Copper deficiency is also a consideration.  It is also possible that he has more than 1 condition.  I doubt that he has a lifelong history of muscle disease but will obtain a screening CPK level.  I do not think that he needs electrodiagnostic testing at this point but would consider based on the clinical course and test results.  It is perhaps possible that he has hypothalamic disease so I am going to obtain an MRI of the brain to make sure he has no subtle structural abnormality in that region.  However, it is certainly possible that no diagnosis will be reached.  He understands and has been frustrated with this although he understands that not having a diagnosis is not the worst thing in the world compared with some other possibilities.  Under those circumstances, symptomatic treatment would be the next step.  Obviously, this would be be considered based on his test results and clinical course.         Relevant Orders    CK     Other Visit Diagnoses       Ataxia        Relevant Orders    MRI brain without contrast    Disorder of vitamin B12        Relevant Orders    Vitamin B12    Methylmalonic  Acid and Homocysteine            Problem List       Benign prostatic hyperplasia with nocturia    Anxiety disorder    Hypothyroidism    Overview     + antiTPO         OAB (overactive bladder)    Overview     Had SE on oxbutynin and Myrbetriq, Had uro lift implants on 11/7/19         Symptomatic anemia    Hyponatremia    Muscle spasm (Chronic)    Lower extremity edema    Bruising    Hiatal hernia    Encounter to discuss test results    Muscle weakness    Current Assessment & Plan     Mr Purcell has numerous complaints; I am not sure if he has a unifying diagnosis or whether multiple conditions might be at play.  His most chronic condition appears to be gastrointestinal.  Given his chronic diarrhea, nutritional deficiency is a consideration.  On examination he has mild proximal muscle weakness with increased patellar reflexes, a Yoni sign, and borderline Babinski's sign.  B12 deficiency with subsequent medical myelopathy is a consideration although he does not have other obvious signs to suggest myelopathy.  Copper deficiency is also a consideration.  It is also possible that he has more than 1 condition.  I doubt that he has a lifelong history of muscle disease but will obtain a screening CPK level.  I do not think that he needs electrodiagnostic testing at this point but would consider based on the clinical course and test results.  It is perhaps possible that he has hypothalamic disease so I am going to obtain an MRI of the brain to make sure he has no subtle structural abnormality in that region.  However, it is certainly possible that no diagnosis will be reached.  He understands and has been frustrated with this although he understands that not having a diagnosis is not the worst thing in the world compared with some other possibilities.  Under those circumstances, symptomatic treatment would be the next step.  Obviously, this would be be considered based on his test results and clinical course.       "    Other Visit Diagnoses       Ataxia        Disorder of vitamin B12                  I had the pleasure of seeing Ayan Purcell, a 63 y.o. male, for neuromuscular consultation. The referral was for weakness.     He tells me that he has \"weird problems all my life\".  He had difficulty with feeding after birth.  He has chronic diarrhea leading to the diagnosis of irritable bowel syndrome.  He has an overactive bladder and was told that he might benefit from Botox injections.  He lives in a group home and \"lacks emotions, I cannot get lonely, bored, or love\".  He reports that he has \"sensitive hearing\".  Celiac disease has been considered; gliadin antibody testing was normal.  I can see that he had endoscopic studies with a biopsy but cannot see the results.  He has an \"overwhelming urge for sweets\".  He becomes lightheaded if he works above his head.  There is a history of exercise intolerance; he has a history of hypothyroidism which is medically managed.  For many years he has \"muscle spasms\" occurring once or twice daily.  They occur in his truncal musculature or his neck and last for only a second or 2 \"like an inhale\".  He does not like wearing a short with a collar because it can make his neck cramp.  Occasionally he drops objects and reports difficulty with .  He saw a  to consider mitochondrial pathology but genetic testing was apparently negative.  Rarely he has a tremor.  Cardiology evaluation was negative.  He saw his family doctor who says that \"my lungs are fine\".  He is scheduled to see another gastroenterologist.      Past Medical History:   Diagnosis Date    Anxiety     Arthritis     Colon polyp     Disease of thyroid gland     Hashimoto thyroiditis, fibrous variant     Irritable bowel syndrome     Muscle weakness     Shortness of breath        Past Surgical History:   Procedure Laterality Date    COLONOSCOPY      HERNIA REPAIR  2003    AR CYSTO INSERTION TRANSPROSTATIC IMPLANT SINGLE " N/A 2019    Procedure: CYSTOSCOPY WITH INSERTION UROLIFT;  Surgeon: Uziel Huerta MD;  Location: [facility];  Service: Urology       Meloxicam, Aspirin, Halobetasol, Olanzapine, and Milk (cow)    Social History     Tobacco Use   Smoking Status Former    Current packs/day: 0.00    Types: Cigarettes    Quit date:     Years since quittin.0    Passive exposure: Past   Smokeless Tobacco Never       Social History     Substance and Sexual Activity   Alcohol Use Never       Social History     Substance and Sexual Activity   Drug Use Never       Family History   Problem Relation Age of Onset    No Known Problems Father     No Known Problems Mother     No Known Problems Half-Sister     No Known Problems Half-Brother          Current Outpatient Medications:     busPIRone (BUSPAR) 10 mg tablet, Take by mouth 3 (three) times a day , Disp: , Rfl:     coal tar-salicylic acid 1 % shampoo, Apply 1 Application topically daily, Disp: , Rfl:     cyclobenzaprine (FLEXERIL) 10 mg tablet, 10 mg daily at bedtime , Disp: , Rfl:     ibuprofen (MOTRIN) 600 mg tablet, ibuprofen 600 mg tablet, Disp: , Rfl:     levothyroxine 100 mcg tablet, levothyroxine 100 mcg tablet, Disp: , Rfl:     omeprazole (PriLOSEC) 20 mg delayed release capsule, , Disp: , Rfl:     venlafaxine (EFFEXOR-XR) 150 mg 24 hr capsule, Take 150 mg by mouth daily, Disp: , Rfl:     ezetimibe (ZETIA) 10 mg tablet, Take 10 mg by mouth daily (Patient not taking: Reported on 2024), Disp: , Rfl:     Office Visit on 2023   Component Date Value Ref Range Status    POST-VOID RESIDUAL VOLUME, ML POC 2023 41  mL Final   Procedure visit on 2023   Component Date Value Ref Range Status    LEUKOCYTE ESTERASE,UA 2023 -   Final    NITRITE,UA 2023 -   Final    SL AMB POCT UROBILINOGEN 2023 0.2   Final    POCT URINE PROTEIN 2023 -   Final     PH,UA 2023 5.0   Final    BLOOD,UA 2023 -   Final    SPECIFIC GRAVITY,UA  11/02/2023 1.010   Final    KETONES,UA 11/02/2023 -   Final    BILIRUBIN,UA 11/02/2023 -   Final    GLUCOSE, UA 11/02/2023 -   Final     COLOR,UA 11/02/2023 yellow   Final    CLARITY,UA 11/02/2023 clear   Final   Orders Only on 08/16/2023   Component Date Value Ref Range Status    Prostate Specific Antigen Total 08/16/2023 3.78  < OR = 4.00 ng/mL Final    Comment: The total PSA value from this assay system is   standardized against the WHO standard. The test   result will be approximately 20% lower when compared   to the equimolar-standardized total PSA (Mallory   Gareth). Comparison of serial PSA results should be   interpreted with this fact in mind.     This test was performed using the Siemens   chemiluminescent method. Values obtained from   different assay methods cannot be used  interchangeably. PSA levels, regardless of  value, should not be interpreted as absolute  evidence of the presence or absence of disease.          No results found for this or any previous visit.     No results found for this or any previous visit.    No results found for this or any previous visit.    No results found for this or any previous visit.    No results found for this or any previous visit.    No results found for this or any previous visit.    No results found for this or any previous visit.    No results found for this or any previous visit.    No results found for this or any previous visit.    No results found for this or any previous visit.    No results found for this or any previous visit.    No results found for this or any previous visit.      Review of Systems   Constitutional:  Positive for fatigue (very fatigued). Negative for appetite change and fever.   HENT: Negative.  Negative for hearing loss, tinnitus, trouble swallowing and voice change.    Eyes: Negative.  Negative for photophobia, pain and visual disturbance.   Respiratory: Negative.  Negative for shortness of breath.    Cardiovascular: Negative.   Negative for palpitations.   Gastrointestinal:  Positive for diarrhea (has IBS). Negative for nausea and vomiting.   Endocrine: Negative.  Negative for cold intolerance.   Genitourinary: Negative.  Negative for dysuria, frequency and urgency.        IBS, OAB   Musculoskeletal:  Positive for gait problem (difficult going up stairs or inclines) and myalgias. Negative for back pain, neck pain and neck stiffness.        Muscle spasms-had almost entire life   Skin: Negative.  Negative for rash.   Allergic/Immunologic: Negative.    Neurological:  Positive for weakness (legs,hands). Negative for dizziness, tremors, seizures, syncope, facial asymmetry, speech difficulty, light-headedness, numbness and headaches.   Hematological: Negative.  Does not bruise/bleed easily.   Psychiatric/Behavioral:  Negative for confusion, hallucinations and sleep disturbance. The patient is nervous/anxious.         Memory issues         On examination,     Blood pressure 137/85, pulse 105, temperature 98.1 °F (36.7 °C), temperature source Temporal, weight 105 kg (231 lb 9.6 oz), SpO2 98%.    Well developed, well nourished, in no acute distress    Normocephalic, atraumatic    Heart: regular rate and rhythm    Extremities: no clubbing, cyanosis, or edema    Speech and cognition appeared normal    Cranial nerves:  II: Pupils equal, round, and reactive to light. No gross visual field defect. I did not appreciate optic disc edema.  III, IV, VI: Extraocular movements intact  V: Normal facial sensation in all three divisions of the trigeminal nerve bilaterally  VII: Normal facial strength  VIII: Hearing intact to finger rub bilaterally  IX, X: Palate elevated symmetrically  XI: Sternocleidomastoid strength normal bilaterally  XII: Tongue protruded in midline without atrophy or fibrillations    Motor:  Muscle testing by the MRC scale revealed:        Right   Left  Deltoid    4   4  Biceps    4+   4+  Triceps    5   5  Wrist flexors   5   5  Wrist  extensors  5   5  Finger flexors   5   5  Finger extensors  5   5  Interossei   5   5  Hip flexors   4   4  Knee extensors  5   5  Tibialis anterior  5   5  Plantar flexors   5   5      Deep tendon reflexes:   2+ and symmetrical in the biceps, triceps, brachioradialis, and ankles; 3+ in the patellas, worse on the left  Toes minimally upgoing, more on the left  Bilateral Bragg's sign    Sensation: Normal pinprick and light touch throughout  No truncal sensory level  Vibratory loss in the feet    Cerebellar: normal finger to nose and heel to shin testing    Gait: Normal heel, toe, and tandem gait            There are no Patient Instructions on file for this visit.      Thank you very much for allowing me to participate in your patient's care. Please feel free to contact me for any questions or concerns. Please be aware of the inherent limitations of voice recognition software, which may result in transcriptional errors.    Rodolfo Abdul MD

## 2024-02-02 ENCOUNTER — TELEPHONE (OUTPATIENT)
Dept: NEUROLOGY | Facility: CLINIC | Age: 64
End: 2024-02-02

## 2024-02-02 NOTE — TELEPHONE ENCOUNTER
Patient called to advise he received a Letter from [facility] denying test #[id number]. The provider will need to resubmit a request for the test.    Please call [facility] to advise when completed.

## 2024-02-05 ENCOUNTER — HOSPITAL ENCOUNTER (OUTPATIENT)
Dept: MRI IMAGING | Facility: CLINIC | Age: 64
Discharge: HOME | End: 2024-02-05
Payer: COMMERCIAL

## 2024-02-05 DIAGNOSIS — R27.0 ATAXIA: ICD-10-CM

## 2024-02-05 LAB
CK SERPL-CCNC: 59 U/L (ref 44–196)
HCYS SERPL-SCNC: 9.9 UMOL/L
METHYLMALONATE SERPL-SCNC: 156 NMOL/L (ref 87–318)
TSH SERPL-ACNC: 4.06 MIU/L (ref 0.4–4.5)
VIT B12 SERPL-MCNC: 312 PG/ML (ref 200–1100)

## 2024-02-05 PROCEDURE — 70551 MRI BRAIN STEM W/O DYE: CPT

## 2024-02-05 PROCEDURE — G1004 CDSM NDSC: HCPCS

## 2024-04-04 ENCOUNTER — TELEPHONE (OUTPATIENT)
Dept: NEUROLOGY | Facility: CLINIC | Age: 64
End: 2024-04-04

## 2024-09-17 ENCOUNTER — TELEPHONE (OUTPATIENT)
Age: 64
End: 2024-09-17

## 2024-09-17 NOTE — TELEPHONE ENCOUNTER
Patient was calling to let the doctor know that he had a CT back in July through [facility]. He wasn't sure if the doctor wanted to review the results before his appt.     And if the doctor would need, he can get the actually imaging from [facility]. Since [facility] will only let them seen the results from outside imaging    Can this please be reviewed with the doctor?    And the patient called if the doctor would like the imaging.

## 2024-09-18 NOTE — TELEPHONE ENCOUNTER
Will be reviewed at time of appt. No additional imaging needed at this time. Can download images from [facility].

## 2024-09-18 NOTE — TELEPHONE ENCOUNTER
Called and left VM for the PT to call the Office back.    If PT calls back Please give PT Nkechi PFEIFFER recommendations and let PT know PT CT is in PT chart.

## 2024-09-18 NOTE — TELEPHONE ENCOUNTER
Patient called stating he got a message to call office. Message relayed as per Nkechi recommendation  Will be reviewed at time of appt. No additional imaging needed at this time. [facility]    Patient verbalized understanding. No further action needed

## 2024-10-08 NOTE — PROGRESS NOTES
"Ayan Purcell is a 63 y.o. male with voiding issues     No anticoagulation     Urolift 2019     LUTS: urgency, freq     Failed oxybutynin for OAB sx     TRUS 8/22/23: 40 g prostate     Cysto 8/22/23: relatively open prostate channel but tight bladder neck     UDS 11/2/23:  Voided 12 ml prior to test with PVR of 30 ml     CMG:       Position:  sitting          First urge:          14 ml,     pdet   2         Normal urge:    17 ml,     pdet  2          Must urge:      20   ml,     pdet  30       Permission to void:     21     ml,     pdet 33          Max pdet during void    38 cm H2O       Voided volume:    7 ml     Bladder stability:      unstable at  12 ml with leak with first fill and + DI at 17 ml with leakage with                         2nd fill     Compliance:  normal         EMG activity:       Normal during filling,        normal during voiding     Comments:  Sensory urgency  + DI with leak and void, fill then restarted with decreased rate of 30cc/min and after 17 ml again had DI with leak and void.           Random bladder scan 41 cc on 12/12/23           Cystoscopy     Date/Time  10/9/2024 10:30 AM     Performed by  Ravi Arriola DO   Authorized by  Ravi Arriola DO     Universal Protocol:  procedure performed by consultantConsent: Verbal consent obtained. Written consent obtained.  Risks and benefits: risks, benefits and alternatives were discussed  Consent given by: patient  Time out: Immediately prior to procedure a \"time out\" was called to verify the correct patient, procedure, equipment, support staff and site/side marked as required.  Timeout called at: 10/9/2024 11:24 AM.  Patient understanding: patient states understanding of the procedure being performed  Patient consent: the patient's understanding of the procedure matches consent given  Procedure consent: procedure consent matches procedure scheduled  Relevant documents: relevant documents present and verified  Required items: " required blood products, implants, devices, and special equipment available  Patient identity confirmed: verbally with patient      Procedure Details:  Procedure type: cystoscopy, injection for chemodenervation    Patient tolerance: Patient tolerated the procedure well with no immediate complications    Additional Procedure Details: Indication for procedure: overactive bladder refractory to oral medication    Timeout performed: Yes    Consent obtained: Yes    Prior to procedure, patient's bladder was instilled with 100 cc of normal saline solution mixed with 10 cc of 2% lidocaine.  This was left in place for about 30 minutes.    Patient was identified in the room.  Consent was obtained.  Timeout was performed.  Female nursing chaperone was present.    Area was prepped and draped in sterile fashion.  10 cc of Lidoject jelly was injected intraurethrally.  Flexible cystoscope was inserted into urethra and into the bladder.    Findings    Urethra: no strictures  Prostate: mild BL lobar, mild median lobe  Bladder: mod to severe trabeculations; no lesions; no stones; dome chimney present  Bilateral ureteral orifices in orthotopic position    Flexible Botox needle was inserted through the bridge of the scope and brought into the visual field.  Mixture of 100 units of onabotulinumtoxinA were already prepared in 10 cc of normal saline.  Needle was used to inject 1 cc of Botox mixture into the detrusor of the posterior wall in 10 different areas.    After injections were completed, the Botox needle was removed.  The injection sites were monitored with the flow off to make sure there was no active bleeding.    Scope was then removed from the bladder and urethra.    Patient tolerated procedure well.                         PLAN  -Asked him to get his PSA within next few months  -Bactrim x3 days sent to pharmacy for ppx. Fu Ucx. Will adjust abx if need be.  -AP visit in about 2-3 months for symptom check. If Botox was working we  will plan on next Botox 100 units in about 6 months from now.

## 2024-10-09 ENCOUNTER — PROCEDURE VISIT (OUTPATIENT)
Dept: UROLOGY | Facility: CLINIC | Age: 64
End: 2024-10-09
Payer: COMMERCIAL

## 2024-10-09 VITALS
WEIGHT: 223 LBS | HEIGHT: 70 IN | BODY MASS INDEX: 31.92 KG/M2 | HEART RATE: 62 BPM | DIASTOLIC BLOOD PRESSURE: 86 MMHG | SYSTOLIC BLOOD PRESSURE: 132 MMHG | OXYGEN SATURATION: 99 % | TEMPERATURE: 97.6 F

## 2024-10-09 DIAGNOSIS — N39.41 URGE INCONTINENCE: ICD-10-CM

## 2024-10-09 DIAGNOSIS — N39.41 URGE INCONTINENCE: Primary | ICD-10-CM

## 2024-10-09 PROCEDURE — 87086 URINE CULTURE/COLONY COUNT: CPT | Performed by: UROLOGY

## 2024-10-09 PROCEDURE — 52287 CYSTOSCOPY CHEMODENERVATION: CPT | Performed by: UROLOGY

## 2024-10-09 RX ORDER — CHOLESTYRAMINE 4 G/9G
POWDER, FOR SUSPENSION ORAL
COMMUNITY
Start: 2024-07-09

## 2024-10-09 RX ORDER — SULFAMETHOXAZOLE/TRIMETHOPRIM 800-160 MG
1 TABLET ORAL EVERY 12 HOURS SCHEDULED
Qty: 6 TABLET | Refills: 0 | Status: SHIPPED | OUTPATIENT
Start: 2024-10-09 | End: 2024-10-12

## 2024-10-09 RX ORDER — SULFAMETHOXAZOLE/TRIMETHOPRIM 800-160 MG
1 TABLET ORAL EVERY 12 HOURS SCHEDULED
Qty: 6 TABLET | Refills: 0 | Status: SHIPPED | OUTPATIENT
Start: 2024-10-09 | End: 2024-10-09 | Stop reason: SDUPTHER

## 2024-10-09 NOTE — PATIENT INSTRUCTIONS
You had cystoscopy, bladder Botox injection done in the office today. This means that we looked inside your urethra and bladder with a camera and we then injected Botox into the wall of your bladder.    You may see some blood in your urine for the next few days. This is normal. Please drink plenty of fluids. Call the office if you are passing large blood clots in your urine or if you are not able to urinate.    It may burn when you urinate for the next few days. This is normal.    Please call the office if you have fevers or chills in the next few days.    An antibiotic called Bactrim was sent to your pharmacy. Please pick this up and take medication as prescribed.    Botox makes your bladder relax. You are therefore at risk of not being able to urinate after the procedure. If you feel like you are having a difficult time with urination, please call the office for further instructions.    You will return to clinic in 2-3 months.

## 2024-10-10 LAB — BACTERIA UR CULT: NORMAL

## 2024-10-13 DIAGNOSIS — Z00.6 ENCOUNTER FOR EXAMINATION FOR NORMAL COMPARISON OR CONTROL IN CLINICAL RESEARCH PROGRAM: ICD-10-CM

## 2024-10-15 ENCOUNTER — APPOINTMENT (OUTPATIENT)
Dept: LAB | Facility: CLINIC | Age: 64
End: 2024-10-15

## 2024-10-15 DIAGNOSIS — Z00.6 ENCOUNTER FOR EXAMINATION FOR NORMAL COMPARISON OR CONTROL IN CLINICAL RESEARCH PROGRAM: ICD-10-CM

## 2024-10-15 PROCEDURE — 36415 COLL VENOUS BLD VENIPUNCTURE: CPT

## 2024-10-25 LAB
APOB+LDLR+PCSK9 GENE MUT ANL BLD/T: NOT DETECTED
BRCA1+BRCA2 DEL+DUP + FULL MUT ANL BLD/T: NOT DETECTED
MLH1+MSH2+MSH6+PMS2 GN DEL+DUP+FUL M: NOT DETECTED

## 2025-01-13 NOTE — ASSESSMENT & PLAN NOTE
S/p bladder botox 10/9/24  Does not want to proceed with Botox any longer or interstim discussion  No benefit from pharmacotherapy   Follow up in 1 year for recheck

## 2025-01-13 NOTE — PROGRESS NOTES
Name: Ayan Purcell      : 1960      MRN: [id number]  Encounter Provider: Maegan Rodríguez PA-C  Encounter Date: 2025   Encounter department: [facility]OLOGY [place]  :  Assessment & Plan  OAB (overactive bladder)  S/p bladder botox 10/9/24  Does not want to proceed with Botox any longer or interstim discussion  No benefit from pharmacotherapy   Follow up in 1 year for recheck        Screening for prostate cancer  PSA in near future   Orders:    PSA Total, Diagnostic; Future        History of Present Illness   Ayan Purcell is a 64 y.o. male who presents for follow up.    Patient had bladder botox performed in 2024. No benefit with Botox. No previous benefit with medications. No PSA on file for review.     AUA SYMPTOM SCORE      Flowsheet Row Most Recent Value   AUA SYMPTOM SCORE    How often have you had a sensation of not emptying your bladder completely after you finished urinating? 5 (P)     How often have you had to urinate again less than two hours after you finished urinating? 4 (P)     How often have you found you stopped and started again several times when you urinate? 5 (P)     How often have you found it difficult to postpone urination? 5 (P)     How often have you had a weak urinary stream? 4 (P)     How often have you had to push or strain to begin urination? 0 (P)     How many times did you most typically get up to urinate from the time you went to bed at night until the time you got up in the morning? 5 (P)     Quality of Life: If you were to spend the rest of your life with your urinary condition just the way it is now, how would you feel about that? 5 (P)     AUA SYMPTOM SCORE 28 (P)            Review of Systems   Constitutional:  Negative for activity change, appetite change, chills and fever.   HENT:  Negative for congestion and trouble swallowing.    Respiratory:  Negative for cough and shortness of breath.    Cardiovascular:  Negative for chest pain,  "palpitations and leg swelling.   Gastrointestinal:  Negative for abdominal pain, constipation, diarrhea, nausea and vomiting.   Genitourinary:  Positive for frequency and urgency. Negative for difficulty urinating, dysuria, flank pain and hematuria.   Musculoskeletal:  Negative for back pain and gait problem.   Skin:  Negative for wound.   Allergic/Immunologic: Negative for immunocompromised state.   Neurological:  Negative for dizziness and syncope.   Hematological:  Does not bruise/bleed easily.   Psychiatric/Behavioral:  Negative for confusion.    All other systems reviewed and are negative.         Objective   /84 (Patient Position: Sitting, Cuff Size: Standard)   Pulse 89   Temp 98.7 °F (37.1 °C) (Temporal)   Ht 5' 10\" (1.778 m)   Wt 102 kg (225 lb)   SpO2 97%   BMI 32.28 kg/m²     Physical Exam  Constitutional:       Appearance: Normal appearance.   HENT:      Head: Normocephalic.      Nose: Nose normal.      Mouth/Throat:      Pharynx: Oropharynx is clear.   Eyes:      Extraocular Movements: Extraocular movements intact.      Pupils: Pupils are equal, round, and reactive to light.   Pulmonary:      Effort: Pulmonary effort is normal.   Musculoskeletal:         General: Normal range of motion.      Cervical back: Normal range of motion.   Skin:     General: Skin is warm.   Neurological:      General: No focal deficit present.      Mental Status: He is alert and oriented to person, place, and time. Mental status is at baseline.   Psychiatric:         Mood and Affect: Mood normal.         Behavior: Behavior normal.         Thought Content: Thought content normal.         Judgment: Judgment normal.          Results  Lab Results   Component Value Date    PSA 3.78 08/16/2023     Lab Results   Component Value Date    CALCIUM 9.0 10/07/2024    K 4.2 10/07/2024    CO2 26 10/07/2024     10/07/2024    BUN 11 10/07/2024    CREATININE 0.86 10/07/2024     Lab Results   Component Value Date    WBC 4.22 (L) " 01/13/2022    HGB 15.1 01/13/2022    HCT 44.5 01/13/2022    MCV 93 01/13/2022     01/13/2022       Office Urine Dip  No results found for this or any previous visit (from the past hour).]

## 2025-01-14 ENCOUNTER — OFFICE VISIT (OUTPATIENT)
Dept: UROLOGY | Facility: CLINIC | Age: 65
End: 2025-01-14
Payer: COMMERCIAL

## 2025-01-14 VITALS
HEART RATE: 89 BPM | TEMPERATURE: 98.7 F | BODY MASS INDEX: 32.21 KG/M2 | HEIGHT: 70 IN | OXYGEN SATURATION: 97 % | DIASTOLIC BLOOD PRESSURE: 84 MMHG | WEIGHT: 225 LBS | SYSTOLIC BLOOD PRESSURE: 116 MMHG

## 2025-01-14 DIAGNOSIS — N40.1 BENIGN PROSTATIC HYPERPLASIA WITH LOWER URINARY TRACT SYMPTOMS, SYMPTOM DETAILS UNSPECIFIED: ICD-10-CM

## 2025-01-14 DIAGNOSIS — N32.81 OAB (OVERACTIVE BLADDER): Primary | ICD-10-CM

## 2025-01-14 DIAGNOSIS — Z12.5 SCREENING FOR PROSTATE CANCER: ICD-10-CM

## 2025-01-14 PROCEDURE — 99213 OFFICE O/P EST LOW 20 MIN: CPT | Performed by: PHYSICIAN ASSISTANT

## 2025-01-14 RX ORDER — SULFAMETHOXAZOLE AND TRIMETHOPRIM 800; 160 MG/1; MG/1
TABLET ORAL
COMMUNITY
Start: 2024-10-09

## 2025-05-05 ENCOUNTER — PATIENT MESSAGE (OUTPATIENT)
Dept: UROLOGY | Facility: CLINIC | Age: 65
End: 2025-05-05

## 2025-06-10 ENCOUNTER — TELEPHONE (OUTPATIENT)
Dept: GASTROENTEROLOGY | Facility: CLINIC | Age: 65
End: 2025-06-10

## 2025-06-10 NOTE — TELEPHONE ENCOUNTER
Spoke with the patient and let him know he is due for his repeat colonoscopy in August. He states he has irritable bowel very bad and doing the prep for a colonoscopy is very hard. He wants to think about it and get back to us.

## 2025-07-15 ENCOUNTER — OFFICE VISIT (OUTPATIENT)
Dept: UROLOGY | Facility: CLINIC | Age: 65
End: 2025-07-15
Payer: COMMERCIAL

## 2025-07-15 VITALS
DIASTOLIC BLOOD PRESSURE: 80 MMHG | SYSTOLIC BLOOD PRESSURE: 122 MMHG | BODY MASS INDEX: 32.64 KG/M2 | HEIGHT: 70 IN | TEMPERATURE: 97.8 F | WEIGHT: 228 LBS | OXYGEN SATURATION: 99 % | HEART RATE: 79 BPM

## 2025-07-15 DIAGNOSIS — N32.81 OAB (OVERACTIVE BLADDER): Primary | ICD-10-CM

## 2025-07-15 DIAGNOSIS — R97.20 ELEVATED PSA: ICD-10-CM

## 2025-07-15 LAB — POST-VOID RESIDUAL VOLUME, ML POC: 43 ML

## 2025-07-15 PROCEDURE — 99214 OFFICE O/P EST MOD 30 MIN: CPT | Performed by: UROLOGY

## 2025-07-15 PROCEDURE — 51798 US URINE CAPACITY MEASURE: CPT | Performed by: UROLOGY

## 2025-07-15 RX ORDER — VENLAFAXINE HYDROCHLORIDE 75 MG/1
CAPSULE, EXTENDED RELEASE ORAL
COMMUNITY
Start: 2025-06-30

## 2025-07-31 ENCOUNTER — HOSPITAL ENCOUNTER (OUTPATIENT)
Dept: RADIOLOGY | Age: 65
Discharge: HOME | End: 2025-07-31
Attending: UROLOGY
Payer: COMMERCIAL

## 2025-07-31 DIAGNOSIS — R97.20 ELEVATED PSA: ICD-10-CM

## 2025-07-31 PROCEDURE — 76377 3D RENDER W/INTRP POSTPROCES: CPT

## 2025-07-31 PROCEDURE — 72197 MRI PELVIS W/O & W/DYE: CPT

## 2025-07-31 PROCEDURE — A9585 GADOBUTROL INJECTION: HCPCS | Performed by: UROLOGY

## 2025-07-31 RX ORDER — GADOBUTROL 604.72 MG/ML
10 INJECTION INTRAVENOUS
Status: COMPLETED | OUTPATIENT
Start: 2025-07-31 | End: 2025-07-31

## 2025-07-31 RX ADMIN — GADOBUTROL 10 ML: 604.72 INJECTION INTRAVENOUS at 10:07

## 2025-08-13 ENCOUNTER — TELEPHONE (OUTPATIENT)
Dept: UROLOGY | Facility: CLINIC | Age: 65
End: 2025-08-13

## 2025-08-13 ENCOUNTER — TELEMEDICINE (OUTPATIENT)
Dept: UROLOGY | Facility: CLINIC | Age: 65
End: 2025-08-13
Payer: COMMERCIAL

## 2025-08-18 ENCOUNTER — TELEPHONE (OUTPATIENT)
Dept: UROLOGY | Facility: CLINIC | Age: 65
End: 2025-08-18

## 2025-08-20 ENCOUNTER — PREP FOR PROCEDURE (OUTPATIENT)
Dept: UROLOGY | Facility: CLINIC | Age: 65
End: 2025-08-20

## 2025-08-20 DIAGNOSIS — Z01.810 PRE-OPERATIVE CARDIOVASCULAR EXAMINATION: ICD-10-CM

## 2025-08-20 DIAGNOSIS — Z01.812 PRE-OPERATIVE LABORATORY EXAMINATION: ICD-10-CM

## 2025-08-20 DIAGNOSIS — N32.81 OAB (OVERACTIVE BLADDER): Primary | ICD-10-CM

## 2025-08-20 DIAGNOSIS — R39.89 SUSPECTED UTI: ICD-10-CM

## (undated) DEVICE — GLOVE SRG BIOGEL 7

## (undated) DEVICE — BAG URINE DRAINAGE 2000ML ANTI RFLX LF

## (undated) DEVICE — CHLORHEXIDINE 4PCT 4 OZ

## (undated) DEVICE — PACK TUR

## (undated) DEVICE — UROCATCH BAG

## (undated) DEVICE — BAG URINE DRAINAGE LEG

## (undated) DEVICE — SPONGE 4 X 4 XRAY 16 PLY STRL LF RFD

## (undated) DEVICE — INVIEW CLEAR LEGGINGS: Brand: CONVERTORS

## (undated) DEVICE — LUBRICANT SURGILUBE TUBE 4 OZ  FLIP TOP

## (undated) DEVICE — CATH FOLEY 20FR 5ML 2 WAY SILICONE ELASTIMER